# Patient Record
Sex: FEMALE | Race: OTHER | HISPANIC OR LATINO | Employment: FULL TIME | URBAN - METROPOLITAN AREA
[De-identification: names, ages, dates, MRNs, and addresses within clinical notes are randomized per-mention and may not be internally consistent; named-entity substitution may affect disease eponyms.]

---

## 2017-01-10 ENCOUNTER — OFFICE VISIT (OUTPATIENT)
Dept: MEDICAL GROUP | Facility: MEDICAL CENTER | Age: 22
End: 2017-01-10
Payer: COMMERCIAL

## 2017-01-10 VITALS
HEART RATE: 78 BPM | BODY MASS INDEX: 32.25 KG/M2 | TEMPERATURE: 97.8 F | SYSTOLIC BLOOD PRESSURE: 110 MMHG | DIASTOLIC BLOOD PRESSURE: 64 MMHG | RESPIRATION RATE: 16 BRPM | HEIGHT: 59 IN | OXYGEN SATURATION: 98 % | WEIGHT: 160 LBS

## 2017-01-10 DIAGNOSIS — M54.50 CHRONIC MIDLINE LOW BACK PAIN WITHOUT SCIATICA: ICD-10-CM

## 2017-01-10 DIAGNOSIS — F34.1 DYSTHYMIA: ICD-10-CM

## 2017-01-10 DIAGNOSIS — G89.29 CHRONIC MIDLINE LOW BACK PAIN WITHOUT SCIATICA: ICD-10-CM

## 2017-01-10 PROCEDURE — 99214 OFFICE O/P EST MOD 30 MIN: CPT | Performed by: INTERNAL MEDICINE

## 2017-01-10 RX ORDER — LIDOCAINE 50 MG/G
1 PATCH TOPICAL EVERY 24 HOURS
Qty: 30 PATCH | Refills: 1 | Status: SHIPPED | OUTPATIENT
Start: 2017-01-10 | End: 2017-03-14 | Stop reason: SDUPTHER

## 2017-01-10 ASSESSMENT — PATIENT HEALTH QUESTIONNAIRE - PHQ9: CLINICAL INTERPRETATION OF PHQ2 SCORE: 0

## 2017-01-10 NOTE — MR AVS SNAPSHOT
"        Isa Lamar   1/10/2017 2:40 PM   Office Visit   MRN: [id number]    Department:  [address] [place] [address]   Dept Phone:  [phone]    Description:  Female : 1995   Provider:  Dave Sanchez M.D.           Reason for Visit     Follow-Up meds      Allergies as of 1/10/2017     Allergen Noted Reactions    Nkda [No Known Drug Allergy] 2009         You were diagnosed with     Chronic midline low back pain without sciatica   [[id number]]         Vital Signs     Blood Pressure Pulse Temperature Respirations Height Weight    110/64 mmHg 78 36.6 °C (97.8 °F) 16 1.499 m (4' 11\") 72.576 kg (160 lb)    Body Mass Index Oxygen Saturation Smoking Status             32.30 kg/m2 98% Former Smoker         Basic Information     Date Of Birth Sex Race Ethnicity Preferred Language    1995 Female  or   Origin (Citizen of [place],Georgian,Georgian,Rudy, etc) English      Your appointments     Elan 10, 2017  2:40 PM   Established Patient with Dave Sanchez M.D.   [facility] [address]    [address] [facility] [address]  [facility] [address]   [phone]           You will be receiving a confirmation call a few days before your appointment from our automated call confirmation system.              Problem List              ICD-10-CM Priority Class Noted - Resolved    Non morbid obesity due to excess calories E66.09   2016 - Present    Dysthymia F34.1   2016 - Present      Health Maintenance        Date Due Completion Dates    PAP SMEAR 2019, 2015    IMM DTaP/Tdap/Td Vaccine (8 - Td) 2023, 2006, 3/10/1998, 1995, 5/10/1994, 3/9/1994, 1994            Current Immunizations     Dtap Vaccine 3/10/1998, 1995, 5/10/1994, 3/9/1994, 1994    HIB Vaccine (ACTHIB/HIBERIX) 1995, 5/10/1994, 3/9/1994, 1994    HPV Quadrivalent Vaccine (GARDASIL) 3/24/2011, 3/24/2011, 2010, 2009    Hepatitis A Vaccine, " Ped/Adol 1/5/2006, 6/15/2005    Hepatitis B Vaccine Non-Recombivax (Ped/Adol) 5/10/1994, 3/9/1994, 1/13/1994    Influenza LAIV (Nasal) 11/19/2010, 10/30/2009    Influenza Vaccine Quad Inj (Pf) 10/16/2015    Influenza Vaccine Quad Inj (Preserved) 10/17/2016  3:05 PM    MMR Vaccine 3/10/1998, 2/1/1995    Meningococcal Conjugate Vaccine MCV4 (Menveo) 5/21/2009    OPV - Historical Data 3/10/1998, 5/10/1994, 3/9/1994, 1/13/1994    Tdap Vaccine 9/26/2013 11:20 PM, 1/5/2006    Tuberculin Skin Test 12/24/2014 12:15 PM, 12/17/2014    Varicella Vaccine Live 8/1/2006, 3/10/1998      Below and/or attached are the medications your provider expects you to take. Review all of your home medications and newly ordered medications with your provider and/or pharmacist. Follow medication instructions as directed by your provider and/or pharmacist. Please keep your medication list with you and share with your provider. Update the information when medications are discontinued, doses are changed, or new medications (including over-the-counter products) are added; and carry medication information at all times in the event of emergency situations     Allergies:  NKDA - (reactions not documented)               Medications  Valid as of: January 10, 2017 -  2:06 PM    Generic Name Brand Name Tablet Size Instructions for use    Citalopram Hydrobromide (Tab) CELEXA 20 MG Take 1 Tab by mouth every day.        Lidocaine (Patch) LIDODERM 5 % Apply 1 Patch to skin as directed every 24 hours.        .                 Medicines prescribed today were sent to:     [facility]/PHARMACY #[id number] - [place], [place] - [address]    [address] [address]    Phone: [phone] Fax: [phone]    Open 24 Hours?: No      Medication refill instructions:       If your prescription bottle indicates you have medication refills left, it is not necessary to call your provider’s office. Please contact your pharmacy and they will refill your medication.    If your  prescription bottle indicates you do not have any refills left, you may request refills at any time through one of the following ways: The online [employer] system (except Urgent Care), by calling your provider’s office, or by asking your pharmacy to contact your provider’s office with a refill request. Medication refills are processed only during regular business hours and may not be available until the next business day. Your provider may request additional information or to have a follow-up visit with you prior to refilling your medication.   *Please Note: Medication refills are assigned a new Rx number when refilled electronically. Your pharmacy may indicate that no refills were authorized even though a new prescription for the same medication is available at the pharmacy. Please request the medicine by name with the pharmacy before contacting your provider for a refill.        Other Notes About Your Plan     GIRL = name will be surprise           [facility] Access Code: Activation code not generated  Current [employer] Status: Active

## 2017-01-10 NOTE — PROGRESS NOTES
"CC: Follow-up back pain and mood.    HPI:   Isa presents today with the following.    1. Chronic midline low back pain without sciatica  Presents reporting persistent intermittent back pain that started 10 in intensity without radiation or change in bowel or bladder. We discussed physical therapy in the past but she is not wanting to do so. She would like to try some Lidoderm patches.    2. Dysthymia  She was started on Celexa and report her mood is 20% better than it was last time we sat down. She denies any ill side effects and no suicidal ideation.      Patient Active Problem List    Diagnosis Date Noted   • Non morbid obesity due to excess calories 11/08/2016   • Dysthymia 11/08/2016       Current Outpatient Prescriptions   Medication Sig Dispense Refill   • lidocaine (LIDODERM) 5 % Patch Apply 1 Patch to skin as directed every 24 hours. 30 Patch 1   • citalopram (CELEXA) 20 MG Tab Take 1 Tab by mouth every day. 30 Tab 6     No current facility-administered medications for this visit.         Allergies as of 01/10/2017 - Luis as Reviewed 01/10/2017   Allergen Reaction Noted   • Nkda [no known drug allergy]  07/23/2009        ROS: As per HPI.    /64 mmHg  Pulse 78  Temp(Src) 36.6 °C (97.8 °F)  Resp 16  Ht 1.499 m (4' 11\")  Wt 72.576 kg (160 lb)  BMI 32.30 kg/m2  SpO2 98%    Physical Exam:  Gen:         Alert and oriented, No apparent distress.  Neck:        No Lymphadenopathy or Bruits.  Lungs:     Clear to auscultation bilaterally  CV:          Regular rate and rhythm. No murmurs, rubs or gallops.               Ext:          No clubbing, cyanosis, edema.      Assessment and Plan.   21 y.o. female with the following issues.    1. Chronic midline low back pain without sciatica  Again discussed physical therapy which she currently declines. Have written for Lidoderm patches.  - lidocaine (LIDODERM) 5 % Patch; Apply 1 Patch to skin as directed every 24 hours.  Dispense: 30 Patch; Refill: 1    2. " Dysthymia  Discussion about increasing dose to medication or therapy but she would like to see if her mood continues to improve. Have encouraged her to find some time for self she currently does not engage in many enjoyable activities. We'll see back in 6 months.

## 2017-03-10 ENCOUNTER — TELEPHONE (OUTPATIENT)
Dept: MEDICAL GROUP | Facility: MEDICAL CENTER | Age: 22
End: 2017-03-10

## 2017-03-10 NOTE — TELEPHONE ENCOUNTER
1. Caller Name: Isa Lamar                                           Call Back Number: [phone] (home)         Patient approves a detailed voicemail message: N\A    2. SPECIFIC Action To Be Taken: Referral for Pulmonology  Patient stated that she has an on going cough with no chills or fevers, gets short of breath with wheezing and wanted to know if you would please place a referral to a pulmonologist, through the [facility]mon[facility] group? Thank you Please advise    3. Diagnosis/Clinical Reason for Request: Short of Breath     4. Specialty & Provider Name/Lab/Imaging Location: N/A    5. Is appointment scheduled for requested order/referral: NO    Unable to place referral

## 2017-03-10 NOTE — TELEPHONE ENCOUNTER
Would suggest we have a visit first to talk about if we havent done so already.  Also if need be we can order breathing tests first to hep decide what is going on.

## 2017-03-10 NOTE — TELEPHONE ENCOUNTER
Patient has been advised about the message below, patient will call back to schedule an appointment to see Dr. Sanchez.

## 2017-03-21 ENCOUNTER — OFFICE VISIT (OUTPATIENT)
Dept: MEDICAL GROUP | Facility: MEDICAL CENTER | Age: 22
End: 2017-03-21
Payer: COMMERCIAL

## 2017-03-21 VITALS
SYSTOLIC BLOOD PRESSURE: 108 MMHG | DIASTOLIC BLOOD PRESSURE: 64 MMHG | TEMPERATURE: 97.5 F | RESPIRATION RATE: 16 BRPM | WEIGHT: 154 LBS | HEART RATE: 86 BPM | BODY MASS INDEX: 31.04 KG/M2 | HEIGHT: 59 IN | OXYGEN SATURATION: 93 %

## 2017-03-21 DIAGNOSIS — R06.09 DOE (DYSPNEA ON EXERTION): ICD-10-CM

## 2017-03-21 DIAGNOSIS — R05.9 COUGH: ICD-10-CM

## 2017-03-21 DIAGNOSIS — K21.00 GASTROESOPHAGEAL REFLUX DISEASE WITH ESOPHAGITIS: ICD-10-CM

## 2017-03-21 PROCEDURE — 99214 OFFICE O/P EST MOD 30 MIN: CPT | Performed by: INTERNAL MEDICINE

## 2017-03-21 RX ORDER — ALBUTEROL SULFATE 90 UG/1
2 AEROSOL, METERED RESPIRATORY (INHALATION) EVERY 6 HOURS PRN
Qty: 8.5 G | Refills: 11 | Status: SHIPPED | OUTPATIENT
Start: 2017-03-21 | End: 2017-08-01 | Stop reason: SDUPTHER

## 2017-03-21 RX ORDER — CITALOPRAM 20 MG/1
40 TABLET ORAL DAILY
Qty: 60 TAB | Refills: 6 | Status: SHIPPED | OUTPATIENT
Start: 2017-03-21 | End: 2018-01-23

## 2017-03-21 RX ORDER — OMEPRAZOLE 40 MG/1
40 CAPSULE, DELAYED RELEASE ORAL DAILY
Qty: 30 CAP | Refills: 6 | Status: SHIPPED | OUTPATIENT
Start: 2017-03-21 | End: 2020-08-10 | Stop reason: SDUPTHER

## 2017-03-21 NOTE — MR AVS SNAPSHOT
"        Isa Lamar   3/21/2017 3:20 PM   Office Visit   MRN: [id number]    Department:  [address] [place] [address]   Dept Phone:  [phone]    Description:  Female : 1995   Provider:  Dave Sanchez M.D.           Reason for Visit     Referral Needed Pulom      Allergies as of 3/21/2017     Allergen Noted Reactions    Nkda [No Known Drug Allergy] 2009         Vital Signs     Blood Pressure Pulse Temperature Respirations Height Weight    108/64 mmHg 86 36.4 °C (97.5 °F) 16 1.499 m (4' 11\") 69.854 kg (154 lb)    Body Mass Index Oxygen Saturation Smoking Status             31.09 kg/m2 93% Former Smoker         Basic Information     Date Of Birth Sex Race Ethnicity Preferred Language    1995 Female  or   Origin (Barbadian,Singaporean,Mauritian,Rudy, etc) English      Your appointments     2017  2:45 PM   Annual Exam with Marjorie Palm M.D.   [facility]    [address]  [facility] [address]   [phone]              Problem List              ICD-10-CM Priority Class Noted - Resolved    Non morbid obesity due to excess calories E66.09   2016 - Present    Dysthymia F34.1   2016 - Present      Health Maintenance        Date Due Completion Dates    PAP SMEAR 2019, 2015    IMM DTaP/Tdap/Td Vaccine (8 - Td) 2023, 2006, 3/10/1998, 1995, 5/10/1994, 3/9/1994, 1994            Current Immunizations     Dtap Vaccine 3/10/1998, 1995, 5/10/1994, 3/9/1994, 1994    HIB Vaccine (ACTHIB/HIBERIX) 1995, 5/10/1994, 3/9/1994, 1994    HPV Quadrivalent Vaccine (GARDASIL) 3/24/2011, 3/24/2011, 2010, 2009    Hepatitis A Vaccine, Ped/Adol 2006, 6/15/2005    Hepatitis B Vaccine Non-Recombivax (Ped/Adol) 5/10/1994, 3/9/1994, 1994    Influenza LAIV (Nasal) 2010, 10/30/2009    Influenza Vaccine Quad Inj (Pf) 10/16/2015   " Influenza Vaccine Quad Inj (Preserved) 10/17/2016  3:05 PM    MMR Vaccine 3/10/1998, 2/1/1995    Meningococcal Conjugate Vaccine MCV4 (Menveo) 5/21/2009    OPV - Historical Data 3/10/1998, 5/10/1994, 3/9/1994, 1/13/1994    Tdap Vaccine 9/26/2013 11:20 PM, 1/5/2006    Tuberculin Skin Test 12/24/2014 12:15 PM, 12/17/2014    Varicella Vaccine Live 8/1/2006, 3/10/1998      Below and/or attached are the medications your provider expects you to take. Review all of your home medications and newly ordered medications with your provider and/or pharmacist. Follow medication instructions as directed by your provider and/or pharmacist. Please keep your medication list with you and share with your provider. Update the information when medications are discontinued, doses are changed, or new medications (including over-the-counter products) are added; and carry medication information at all times in the event of emergency situations     Allergies:  NKDA - (reactions not documented)               Medications  Valid as of: March 21, 2017 -  3:52 PM    Generic Name Brand Name Tablet Size Instructions for use    Citalopram Hydrobromide (Tab) CELEXA 40 MG Take 1 Tab by mouth every day.        Lidocaine (Patch) LIDODERM 5 % Apply 1 Patch to skin as directed every 24 hours.        .                 Medicines prescribed today were sent to:     [facility]/PHARMACY #[id number] [facility] [place] - [address]    [address] [address]    Phone: [phone] Fax: [phone]    Open 24 Hours?: No      Medication refill instructions:       If your prescription bottle indicates you have medication refills left, it is not necessary to call your provider’s office. Please contact your pharmacy and they will refill your medication.    If your prescription bottle indicates you do not have any refills left, you may request refills at any time through one of the following ways: The online [employer] system (except Urgent Care), by calling your provider’s  office, or by asking your pharmacy to contact your provider’s office with a refill request. Medication refills are processed only during regular business hours and may not be available until the next business day. Your provider may request additional information or to have a follow-up visit with you prior to refilling your medication.   *Please Note: Medication refills are assigned a new Rx number when refilled electronically. Your pharmacy may indicate that no refills were authorized even though a new prescription for the same medication is available at the pharmacy. Please request the medicine by name with the pharmacy before contacting your provider for a refill.        Other Notes About Your Plan     GIRL = name will be surprise           MyChart Access Code: Activation code not generated  Current [employer] Status: Active

## 2017-03-21 NOTE — PROGRESS NOTES
"CC: Follow-up multiple issues    HPI:   Isa presents today with the following.    1. OTT (dyspnea on exertion)  Main complaint is wheezing with activity. She's never been diagnosed with asthma or use an inhaler. She has become more active in the recent past and again her weight is going down. She denies any chest pain or edema. She does have some mild allergy type symptoms.    2. Gastroesophageal reflux disease with esophagitis  She does suffer from reflux persistently taking Tums when necessary. Several times per week. No difficulty swallowing and no blood or black stools.    3. Cough  She is complaining of a persistent cough outside of exercise. She was sick several weeks ago but that has resolved but the cough persists.      Patient Active Problem List    Diagnosis Date Noted   • Gastroesophageal reflux disease with esophagitis 03/21/2017   • Non morbid obesity due to excess calories 11/08/2016   • Dysthymia 11/08/2016       Current Outpatient Prescriptions   Medication Sig Dispense Refill   • omeprazole (PRILOSEC) 40 MG delayed-release capsule Take 1 Cap by mouth every day. 30 Cap 6   • albuterol 108 (90 BASE) MCG/ACT Aero Soln inhalation aerosol Inhale 2 Puffs by mouth every 6 hours as needed for Shortness of Breath. 8.5 g 11   • citalopram (CELEXA) 20 MG Tab Take 2 Tabs by mouth every day. 60 Tab 6   • lidocaine (LIDODERM) 5 % Patch Apply 1 Patch to skin as directed every 24 hours. 30 Patch 1     No current facility-administered medications for this visit.         Allergies as of 03/21/2017 - Luis as Reviewed 03/21/2017   Allergen Reaction Noted   • Nkda [no known drug allergy]  07/23/2009        ROS: As per HPI.    /64 mmHg  Pulse 86  Temp(Src) 36.4 °C (97.5 °F)  Resp 16  Ht 1.499 m (4' 11\")  Wt 69.854 kg (154 lb)  BMI 31.09 kg/m2  SpO2 93%    Physical Exam:  Gen:         Alert and oriented, No apparent distress.  Neck:        No Lymphadenopathy or Bruits.  Lungs:     Clear to auscultation " bilaterally  CV:          Regular rate and rhythm. No murmurs, rubs or gallops.               Ext:          No clubbing, cyanosis, edema.      Assessment and Plan.   21 y.o. female with the following issues.    1. OTT (dyspnea on exertion)  Have written for pulmonary function tests given an albuterol inhaler prior to activity.  - PULMONARY FUNCTION TESTS Test requested: Complete Pulmonary Function Test  - albuterol 108 (90 BASE) MCG/ACT Aero Soln inhalation aerosol; Inhale 2 Puffs by mouth every 6 hours as needed for Shortness of Breath.  Dispense: 8.5 g; Refill: 11    2. Gastroesophageal reflux disease with esophagitis  Placing on omeprazole cautioned about side effects.  - omeprazole (PRILOSEC) 40 MG delayed-release capsule; Take 1 Cap by mouth every day.  Dispense: 30 Cap; Refill: 6    3. Cough  Possible contributing factors of heartburn, allergies, potentially asthma. She is gonna take an antihistamine as well as the above treatments and will follow-up with results.

## 2017-04-03 ENCOUNTER — TELEPHONE (OUTPATIENT)
Dept: PULMONOLOGY | Facility: HOSPICE | Age: 22
End: 2017-04-03

## 2017-04-03 DIAGNOSIS — R06.00 DYSPNEA, UNSPECIFIED TYPE: ICD-10-CM

## 2017-04-04 ENCOUNTER — NON-PROVIDER VISIT (OUTPATIENT)
Dept: PULMONOLOGY | Facility: HOSPICE | Age: 22
End: 2017-04-04
Attending: INTERNAL MEDICINE
Payer: COMMERCIAL

## 2017-04-04 VITALS — BODY MASS INDEX: 30.24 KG/M2 | WEIGHT: 150 LBS | HEIGHT: 59 IN

## 2017-04-04 DIAGNOSIS — R06.00 DYSPNEA, UNSPECIFIED TYPE: ICD-10-CM

## 2017-04-04 PROCEDURE — 94060 EVALUATION OF WHEEZING: CPT | Performed by: INTERNAL MEDICINE

## 2017-04-04 PROCEDURE — 94729 DIFFUSING CAPACITY: CPT | Performed by: INTERNAL MEDICINE

## 2017-04-04 PROCEDURE — 94726 PLETHYSMOGRAPHY LUNG VOLUMES: CPT | Performed by: INTERNAL MEDICINE

## 2017-04-04 ASSESSMENT — PULMONARY FUNCTION TESTS
FEV1_PERCENT_CHANGE: 0
FEV1/FVC_PERCENT_CHANGE: 0
FVC: 3.67
FEV1/FVC_PERCENT_PREDICTED: 87
FEV1/FVC: 81
FEV1: 2.98
FEV1/FVC: 86.38
FEV1_PERCENT_PREDICTED: 105
FVC_PREDICTED: 3.26
FVC_PERCENT_PREDICTED: 112
FVC: 3.67
FEV1: 3.17
FEV1_PERCENT_CHANGE: 6
FVC_PERCENT_PREDICTED: 112
FEV1_PERCENT_PREDICTED: 111
FEV1_PREDICTED: 2.84
FEV1/FVC_PERCENT_PREDICTED: 94
FEV1/FVC_PERCENT_PREDICTED: 99

## 2017-04-04 NOTE — MR AVS SNAPSHOT
"        Isa AbebeRamila   2017 2:00 PM   Non-Provider Visit   MRN: [id number]    Department:  [facility]   Dept Phone:  [phone]    Description:  Female : 1995   Provider:  Arthur Patel M.D.           Reason for Visit     Shortness of Breath           Allergies as of 2017     Allergen Noted Reactions    Nkda [No Known Drug Allergy] 2009         You were diagnosed with     Dyspnea, unspecified type   [[id number]]         Vital Signs     Height Weight Body Mass Index Smoking Status          1.499 m (4' 11\") 68.04 kg (150 lb) 30.28 kg/m2 Former Smoker        Basic Information     Date Of Birth Sex Race Ethnicity Preferred Language    1995 Female  or   Origin (Amharic,Libyan,Moroccan,Rudy, etc) English      Your appointments     2017  2:45 PM   Annual Exam with Marjorie Palm M.D.   [facility]    [address]  [facility] [id number] [phone]              Problem List              ICD-10-CM Priority Class Noted - Resolved    Non morbid obesity due to excess calories E66.09   2016 - Present    Dysthymia F34.1   2016 - Present    Gastroesophageal reflux disease with esophagitis K21.0   3/21/2017 - Present      Health Maintenance        Date Due Completion Dates    PAP SMEAR 2019, 2015    IMM DTaP/Tdap/Td Vaccine (8 - Td) 2023, 2006, 3/10/1998, 1995, 5/10/1994, 3/9/1994, 1994            Current Immunizations     Dtap Vaccine 3/10/1998, 1995, 5/10/1994, 3/9/1994, 1994    HIB Vaccine (ACTHIB/HIBERIX) 1995, 5/10/1994, 3/9/1994, 1994    HPV Quadrivalent Vaccine (GARDASIL) 3/24/2011, 3/24/2011, 2010, 2009    Hepatitis A Vaccine, Ped/Adol 2006, 6/15/2005    Hepatitis B Vaccine Non-Recombivax (Ped/Adol) 5/10/1994, 3/9/1994, 1994    Influenza LAIV (Nasal) 2010, 10/30/2009   " Influenza Vaccine Quad Inj (Pf) 10/16/2015    Influenza Vaccine Quad Inj (Preserved) 10/17/2016  3:05 PM    MMR Vaccine 3/10/1998, 2/1/1995    Meningococcal Conjugate Vaccine MCV4 (Menveo) 5/21/2009    OPV - Historical Data 3/10/1998, 5/10/1994, 3/9/1994, 1/13/1994    Tdap Vaccine 9/26/2013 11:20 PM, 1/5/2006    Tuberculin Skin Test 12/24/2014 12:15 PM, 12/17/2014    Varicella Vaccine Live 8/1/2006, 3/10/1998      Below and/or attached are the medications your provider expects you to take. Review all of your home medications and newly ordered medications with your provider and/or pharmacist. Follow medication instructions as directed by your provider and/or pharmacist. Please keep your medication list with you and share with your provider. Update the information when medications are discontinued, doses are changed, or new medications (including over-the-counter products) are added; and carry medication information at all times in the event of emergency situations     Allergies:  NKDA - (reactions not documented)               Medications  Valid as of: April 04, 2017 -  2:38 PM    Generic Name Brand Name Tablet Size Instructions for use    Albuterol Sulfate (Aero Soln) albuterol 108 (90 BASE) MCG/ACT Inhale 2 Puffs by mouth every 6 hours as needed for Shortness of Breath.        Citalopram Hydrobromide (Tab) CELEXA 20 MG Take 2 Tabs by mouth every day.        Lidocaine (Patch) LIDODERM 5 % Apply 1 Patch to skin as directed every 24 hours.        Omeprazole (CAPSULE DELAYED RELEASE) PRILOSEC 40 MG Take 1 Cap by mouth every day.        .                 Medicines prescribed today were sent to:     [facility]/PHARMACY #[id number] - [place], [place] - [address]    [address] [address]    Phone: [phone] Fax: [phone]    Open 24 Hours?: No      Medication refill instructions:       If your prescription bottle indicates you have medication refills left, it is not necessary to call your provider’s office. Please  contact your pharmacy and they will refill your medication.    If your prescription bottle indicates you do not have any refills left, you may request refills at any time through one of the following ways: The online [employer] system (except Urgent Care), by calling your provider’s office, or by asking your pharmacy to contact your provider’s office with a refill request. Medication refills are processed only during regular business hours and may not be available until the next business day. Your provider may request additional information or to have a follow-up visit with you prior to refilling your medication.   *Please Note: Medication refills are assigned a new Rx number when refilled electronically. Your pharmacy may indicate that no refills were authorized even though a new prescription for the same medication is available at the pharmacy. Please request the medicine by name with the pharmacy before contacting your provider for a refill.        Other Notes About Your Plan     GIRL = name will be surprise           [employer] Access Code: Activation code not generated  Current [employer] Status: Active

## 2017-04-04 NOTE — PROCEDURES
Technician: RITO Mckeon    Technician Comment:  Good patient effort & cooperation.  The results of this test meet the ATS/ERS standards for acceptability & reproducibility.  Test was performed on the [employer] Body Plethysmograph-Elite DX system.  Predicted values were [facility]-3 for spirometry, [facility] for DLCO, ITS for Lung Volumes.  The DLCO was uncorrected for Hgb.  A bronchodilator of Ventolin HFA -2puffs via spacer administered.    Interpretation:    Baseline FEV1 is 105% of predicted at 2.98 L. FEV1 FVC ratio is normal.  After administration of an inhaled bronchodilator there is a 6% improvement in FEV1.  Total lung capacity is 116% of predicted.  Gas exchange is estimated by DLCO is 124% of predicted  Airways resistance is normal.    No definite evidence of significant obstructive or restrictive pulmonary disease is noted on this study. The study may be compatible with a clinical diagnosis of asthma.

## 2017-04-07 ENCOUNTER — PATIENT MESSAGE (OUTPATIENT)
Dept: OBGYN | Facility: MEDICAL CENTER | Age: 22
End: 2017-04-07

## 2017-04-07 NOTE — TELEPHONE ENCOUNTER
----- Message from Your Healthcare Team sent at 4/7/2017  2:05 AM PDT -----  Regarding: Non-Urgent Medical Question  Contact: [phone]  I've been waking up with the urgency to go to the bathroom every time I go not much comes out and it' burns. I feel very uncomfortable, I may have a UTI can you please send labs. Thank you

## 2017-04-11 DIAGNOSIS — N30.00 ACUTE CYSTITIS WITHOUT HEMATURIA: ICD-10-CM

## 2017-05-09 ENCOUNTER — GYNECOLOGY VISIT (OUTPATIENT)
Dept: OBGYN | Facility: MEDICAL CENTER | Age: 22
End: 2017-05-09
Payer: COMMERCIAL

## 2017-05-09 ENCOUNTER — HOSPITAL ENCOUNTER (OUTPATIENT)
Facility: MEDICAL CENTER | Age: 22
End: 2017-05-09
Attending: OBSTETRICS & GYNECOLOGY
Payer: COMMERCIAL

## 2017-05-09 VITALS
BODY MASS INDEX: 31.04 KG/M2 | HEIGHT: 59 IN | SYSTOLIC BLOOD PRESSURE: 110 MMHG | DIASTOLIC BLOOD PRESSURE: 80 MMHG | WEIGHT: 154 LBS

## 2017-05-09 DIAGNOSIS — Z01.419 WELL WOMAN EXAM: ICD-10-CM

## 2017-05-09 DIAGNOSIS — Z11.3 SCREENING EXAMINATION FOR VENEREAL DISEASE: ICD-10-CM

## 2017-05-09 DIAGNOSIS — Z12.4 ROUTINE CERVICAL SMEAR: ICD-10-CM

## 2017-05-09 LAB
APPEARANCE UR: CLEAR
BILIRUB UR STRIP-MCNC: NORMAL MG/DL
COLOR UR AUTO: YELLOW
GLUCOSE UR STRIP.AUTO-MCNC: NORMAL MG/DL
KETONES UR STRIP.AUTO-MCNC: NORMAL MG/DL
LEUKOCYTE ESTERASE UR QL STRIP.AUTO: NORMAL
NITRITE UR QL STRIP.AUTO: NORMAL
PH UR STRIP.AUTO: 7.5 [PH] (ref 5–8)
PROT UR QL STRIP: NORMAL MG/DL
RBC UR QL AUTO: NORMAL
SP GR UR STRIP.AUTO: 1.01
UROBILINOGEN UR STRIP-MCNC: NORMAL MG/DL

## 2017-05-09 PROCEDURE — 87186 SC STD MICRODIL/AGAR DIL: CPT

## 2017-05-09 PROCEDURE — 87491 CHLMYD TRACH DNA AMP PROBE: CPT

## 2017-05-09 PROCEDURE — 87086 URINE CULTURE/COLONY COUNT: CPT

## 2017-05-09 PROCEDURE — 87077 CULTURE AEROBIC IDENTIFY: CPT

## 2017-05-09 PROCEDURE — 88175 CYTOPATH C/V AUTO FLUID REDO: CPT

## 2017-05-09 PROCEDURE — 87591 N.GONORRHOEAE DNA AMP PROB: CPT

## 2017-05-09 PROCEDURE — 81002 URINALYSIS NONAUTO W/O SCOPE: CPT | Performed by: OBSTETRICS & GYNECOLOGY

## 2017-05-09 PROCEDURE — 99395 PREV VISIT EST AGE 18-39: CPT | Mod: 25 | Performed by: OBSTETRICS & GYNECOLOGY

## 2017-05-09 ASSESSMENT — ENCOUNTER SYMPTOMS
PALPITATIONS: 0
ABDOMINAL PAIN: 0
MYALGIAS: 0
PHOTOPHOBIA: 0
CONSTITUTIONAL NEGATIVE: 1
DOUBLE VISION: 0
DIARRHEA: 0
SHORTNESS OF BREATH: 0
NERVOUS/ANXIOUS: 0
HALLUCINATIONS: 0
CONSTIPATION: 0
BLURRED VISION: 0
DEPRESSION: 0
SPUTUM PRODUCTION: 0

## 2017-05-09 ASSESSMENT — LIFESTYLE VARIABLES: SUBSTANCE_ABUSE: 0

## 2017-05-09 NOTE — MR AVS SNAPSHOT
"        Isa AbebeKoreyPadgett   2017 2:45 PM   Gynecology Visit   MRN: [id number]    Department:  [facility]   Dept Phone:  [phone]    Description:  Female : 1995   Provider:  Marjorie Palm M.D.           Reason for Visit     Gynecologic Exam Annual Exam       Allergies as of 2017     Allergen Noted Reactions    Nkda [No Known Drug Allergy] 2009         You were diagnosed with     Well woman exam   [588998]       Routine cervical smear   [990282]       Screening examination for venereal disease   [V74.5.ICD-9-CM]         Vital Signs     Blood Pressure Height Weight Body Mass Index Last Menstrual Period Smoking Status    110/80 mmHg 1.499 m (4' 11.02\") 69.854 kg (154 lb) 31.09 kg/m2 2017 Former Smoker      Basic Information     Date Of Birth Sex Race Ethnicity Preferred Language    1995 Female  or   Origin (Cymraes,Omani,Finnish,Rudy, etc) English      Problem List              ICD-10-CM Priority Class Noted - Resolved    Non morbid obesity due to excess calories E66.09   2016 - Present    Dysthymia F34.1   2016 - Present    Gastroesophageal reflux disease with esophagitis K21.0   3/21/2017 - Present      Health Maintenance        Date Due Completion Dates    PAP SMEAR 2019, 2015    IMM DTaP/Tdap/Td Vaccine (8 - Td) 2023, 2006, 3/10/1998, 1995, 5/10/1994, 3/9/1994, 1994            Current Immunizations     Dtap Vaccine 3/10/1998, 1995, 5/10/1994, 3/9/1994, 1994    HIB Vaccine (ACTHIB/HIBERIX) 1995, 5/10/1994, 3/9/1994, 1994    HPV Quadrivalent Vaccine (GARDASIL) 3/24/2011, 3/24/2011, 2010, 2009    Hepatitis A Vaccine, Ped/Adol 2006, 6/15/2005    Hepatitis B Vaccine Non-Recombivax (Ped/Adol) 5/10/1994, 3/9/1994, 1994    Influenza LAIV (Nasal) 2010, 10/30/2009    Influenza Vaccine Quad Inj (Pf) 10/16/2015    Influenza Vaccine Quad Inj " (Preserved) 10/17/2016  3:05 PM    MMR Vaccine 3/10/1998, 2/1/1995    Meningococcal Conjugate Vaccine MCV4 (Menveo) 5/21/2009    OPV - Historical Data 3/10/1998, 5/10/1994, 3/9/1994, 1/13/1994    Tdap Vaccine 9/26/2013 11:20 PM, 1/5/2006    Tuberculin Skin Test 12/24/2014 12:15 PM, 12/17/2014    Varicella Vaccine Live 8/1/2006, 3/10/1998      Below and/or attached are the medications your provider expects you to take. Review all of your home medications and newly ordered medications with your provider and/or pharmacist. Follow medication instructions as directed by your provider and/or pharmacist. Please keep your medication list with you and share with your provider. Update the information when medications are discontinued, doses are changed, or new medications (including over-the-counter products) are added; and carry medication information at all times in the event of emergency situations     Allergies:  NKDA - (reactions not documented)               Medications  Valid as of: May 09, 2017 -  3:19 PM    Generic Name Brand Name Tablet Size Instructions for use    Albuterol Sulfate (Aero Soln) albuterol 108 (90 BASE) MCG/ACT Inhale 2 Puffs by mouth every 6 hours as needed for Shortness of Breath.        Citalopram Hydrobromide (Tab) CELEXA 20 MG Take 2 Tabs by mouth every day.        Lidocaine (Patch) LIDODERM 5 % Apply 1 Patch to skin as directed every 24 hours.        Omeprazole (CAPSULE DELAYED RELEASE) PRILOSEC 40 MG Take 1 Cap by mouth every day.        .                 Medicines prescribed today were sent to:     [facility]/PHARMACY #[id number] - [place], [place] - [address] [facility]    [address] [place] [address]    Phone: [phone] Fax: [phone]    Open 24 Hours?: No      Medication refill instructions:       If your prescription bottle indicates you have medication refills left, it is not necessary to call your provider’s office. Please contact your pharmacy and they will refill your medication.    If your  prescription bottle indicates you do not have any refills left, you may request refills at any time through one of the following ways: The online [employer] system (except Urgent Care), by calling your provider’s office, or by asking your pharmacy to contact your provider’s office with a refill request. Medication refills are processed only during regular business hours and may not be available until the next business day. Your provider may request additional information or to have a follow-up visit with you prior to refilling your medication.   *Please Note: Medication refills are assigned a new Rx number when refilled electronically. Your pharmacy may indicate that no refills were authorized even though a new prescription for the same medication is available at the pharmacy. Please request the medicine by name with the pharmacy before contacting your provider for a refill.        Your To Do List     Future Labs/Procedures Complete By Expires    HEP B SURFACE ANTIGEN  As directed 5/9/2018    HIV ANTIBODIES  As directed 5/9/2018    THINPREP RFLX HPV ASCUS W/CTNG  As directed 5/9/2018      Other Notes About Your Plan     GIRL = name will be surprise           Flowonixt Access Code: Activation code not generated  Current [employer] Status: Active

## 2017-05-09 NOTE — PROGRESS NOTES
Subjective:      Isa Lamar is a 21 y.o. female who presents with Gynecologic Exam    For annual exam wants STD screening.  On nexplanon   has periods on and off.    Active Ambulatory Problems     Diagnosis Date Noted   • Non morbid obesity due to excess calories 11/08/2016   • Dysthymia 11/08/2016   • Gastroesophageal reflux disease with esophagitis 03/21/2017     Resolved Ambulatory Problems     Diagnosis Date Noted   • EBV infection 11/22/2010   • Supervision of normal first pregnancy 02/20/2013   • Nausea and vomiting in pregnancy 02/20/2013   • Chlamydia infection - ROBBIE done 3/21 03/21/2013   • UTI (urinary tract infection) during pregnancy 03/21/2013   • EBV infection - Chronic 05/14/2013   • Normal labor and delivery 09/26/2013   • Postpartum care and examination of lactating mother 11/06/2013   • Closed fracture of metacarpal bone 06/24/2014   • Flank pain 03/26/2015     Past Medical History   Diagnosis Date   • Anemia 2013   • Infectious mononucleosis      Social History     Social History   • Marital Status: Single     Spouse Name: N/A   • Number of Children: N/A   • Years of Education: N/A     Occupational History   • Not on file.     Social History Main Topics   • Smoking status: Former Smoker -- 0.05 packs/day for 2 years     Types: Cigarettes     Quit date: 02/20/2011   • Smokeless tobacco: Never Used   • Alcohol Use: No   • Drug Use: No   • Sexual Activity:     Partners: Male     Birth Control/ Protection: Condom      Comment: condom     Other Topics Concern   • Not on file     Social History Narrative     Family History   Problem Relation Age of Onset   • Cancer Maternal Aunt      breast cancer    • Diabetes Maternal Uncle    • Lung Disease Paternal Aunt      asthma   • Hyperlipidemia Maternal Grandfather    • Diabetes Maternal Grandfather      diet control   • Hypertension Maternal Grandfather    • Heart Disease Maternal Grandfather    • Hypertension Father      Past Surgical History  "  Procedure Laterality Date   • Finger orif  6/24/2014     Performed by Ed Espitia M.D. at SUR[facility]ERY [facility]   • Percutaneous pinning  6/24/2014     Performed by Ed Espitia M.D. at SUR[facility]ERY [facility]     Nkda          Gynecologic Exam  Pertinent negatives include no abdominal pain, constipation, diarrhea, dysuria, frequency or urgency.       Review of Systems   Constitutional: Negative.    HENT: Negative for congestion and nosebleeds.    Eyes: Negative for blurred vision, double vision and photophobia.   Respiratory: Negative for sputum production and shortness of breath.    Cardiovascular: Negative for chest pain and palpitations.   Gastrointestinal: Negative for abdominal pain, diarrhea and constipation.   Genitourinary: Negative for dysuria, urgency and frequency.   Musculoskeletal: Negative for myalgias.   Skin: Negative.    Psychiatric/Behavioral: Negative for depression, suicidal ideas, hallucinations and substance abuse. The patient is not nervous/anxious.           Objective:     /80 mmHg  Ht 1.499 m (4' 11.02\")  Wt 69.854 kg (154 lb)  BMI 31.09 kg/m2  LMP 04/03/2017     Physical Exam   Constitutional: She is oriented to person, place, and time. She appears well-developed and well-nourished.   HENT:   Head: Normocephalic and atraumatic.   Eyes: Conjunctivae and EOM are normal. Pupils are equal, round, and reactive to light.   Neck: Normal range of motion. Neck supple. No thyromegaly present.   Cardiovascular: Normal rate, regular rhythm and normal heart sounds.    Pulmonary/Chest: Effort normal and breath sounds normal.   Abdominal: Soft. Bowel sounds are normal.   Musculoskeletal: Normal range of motion.   Neurological: She is alert and oriented to person, place, and time. She has normal reflexes.   Skin: Skin is warm and dry.   Psychiatric: She has a normal mood and affect. Her behavior is normal. Judgment and thought content normal.   Nursing note and vitals " reviewed.              Assessment/Plan:     1. Well woman exam    - THINPREP RFLX HPV ASCUS W/CTNG; Future  - POCT Urinalysis    2. Routine cervical smear    - THINPREP RFLX HPV ASCUS W/CTNG; Future  - POCT Urinalysis    3. Screening examination for venereal disease    - THINPREP RFLX HPV ASCUS W/CTNG; Future  - HIV ANTIBODIES; Future  - HEP B SURFACE ANTIGEN; Future  - RPR QUAL W/REFLEX

## 2017-05-11 LAB
C TRACH DNA GENITAL QL NAA+PROBE: NEGATIVE
CYTOLOGY REG CYTOL: NORMAL
N GONORRHOEA DNA GENITAL QL NAA+PROBE: NEGATIVE
SPECIMEN SOURCE: NORMAL

## 2017-05-12 LAB
BACTERIA UR CULT: ABNORMAL
SIGNIFICANT IND 70042: ABNORMAL
SITE SITE: ABNORMAL
SOURCE SOURCE: ABNORMAL

## 2017-06-05 RX ORDER — NITROFURANTOIN 25; 75 MG/1; MG/1
100 CAPSULE ORAL 2 TIMES DAILY
Qty: 14 CAP | Refills: 0 | OUTPATIENT
Start: 2017-06-05 | End: 2017-06-12

## 2017-07-27 ENCOUNTER — TELEPHONE (OUTPATIENT)
Dept: MEDICAL GROUP | Facility: MEDICAL CENTER | Age: 22
End: 2017-07-27

## 2017-07-27 NOTE — TELEPHONE ENCOUNTER
Future Appointments       Provider Department Center    8/1/2017 1:20 PM Dave Sanchez M.D. [facility] [address]        ESTABLISHED PATIENT PRE-VISIT PLANNING     Note: Patient will not be contacted if there is no indication to call.     1.  Reviewed note from last office visit with PCP and/or other med group provider: Yes    2.  If any orders were placed at last visit, do we have Results/Consult Notes?        •  Labs - Labs were not ordered at last office visit.       •  Imaging - Imaging was not ordered at last office visit.       •  Referrals - No referrals were ordered at last office visit.    3.  Immunizations were updated in Epic using WebIZ?: Yes       •  Web Iz Recommendations: Patient is up to date on all vaccines    4.  Patient is due for the following Health Maintenance Topics:   There are no preventive care reminders to display for this patient.    - Patient is up-to-date on all Health Maintenance topics. No records have been requested at this time.    5.  Patient was not informed to arrive 15 min prior to their scheduled appointment and bring in their medication bottles.

## 2017-08-01 ENCOUNTER — HOSPITAL ENCOUNTER (OUTPATIENT)
Dept: LAB | Facility: MEDICAL CENTER | Age: 22
End: 2017-08-01
Attending: INTERNAL MEDICINE
Payer: COMMERCIAL

## 2017-08-01 ENCOUNTER — OFFICE VISIT (OUTPATIENT)
Dept: MEDICAL GROUP | Facility: MEDICAL CENTER | Age: 22
End: 2017-08-01
Payer: COMMERCIAL

## 2017-08-01 VITALS
RESPIRATION RATE: 16 BRPM | TEMPERATURE: 98.2 F | SYSTOLIC BLOOD PRESSURE: 118 MMHG | WEIGHT: 155.6 LBS | HEART RATE: 80 BPM | HEIGHT: 59 IN | BODY MASS INDEX: 31.37 KG/M2 | DIASTOLIC BLOOD PRESSURE: 72 MMHG | OXYGEN SATURATION: 100 %

## 2017-08-01 DIAGNOSIS — E66.9 OBESITY (BMI 30.0-34.9): ICD-10-CM

## 2017-08-01 DIAGNOSIS — J02.9 SORE THROAT: ICD-10-CM

## 2017-08-01 DIAGNOSIS — R53.83 FATIGUE, UNSPECIFIED TYPE: ICD-10-CM

## 2017-08-01 DIAGNOSIS — M79.2 NERVE PAIN: ICD-10-CM

## 2017-08-01 PROBLEM — E66.811 OBESITY (BMI 30.0-34.9): Status: ACTIVE | Noted: 2017-08-01

## 2017-08-01 LAB
BASOPHILS # BLD AUTO: 0.2 % (ref 0–1.8)
BASOPHILS # BLD: 0.02 K/UL (ref 0–0.12)
EOSINOPHIL # BLD AUTO: 0.15 K/UL (ref 0–0.51)
EOSINOPHIL NFR BLD: 1.8 % (ref 0–6.9)
ERYTHROCYTE [DISTWIDTH] IN BLOOD BY AUTOMATED COUNT: 42.8 FL (ref 35.9–50)
FERRITIN SERPL-MCNC: 31.1 NG/ML (ref 10–291)
FOLATE SERPL-MCNC: 16 NG/ML
HCT VFR BLD AUTO: 42.9 % (ref 37–47)
HGB BLD-MCNC: 14.3 G/DL (ref 12–16)
IMM GRANULOCYTES # BLD AUTO: 0.03 K/UL (ref 0–0.11)
IMM GRANULOCYTES NFR BLD AUTO: 0.4 % (ref 0–0.9)
INT CON NEG: NEGATIVE
INT CON POS: POSITIVE
LYMPHOCYTES # BLD AUTO: 2.41 K/UL (ref 1–4.8)
LYMPHOCYTES NFR BLD: 28.1 % (ref 22–41)
MCH RBC QN AUTO: 31.1 PG (ref 27–33)
MCHC RBC AUTO-ENTMCNC: 33.3 G/DL (ref 33.6–35)
MCV RBC AUTO: 93.3 FL (ref 81.4–97.8)
MONOCYTES # BLD AUTO: 0.49 K/UL (ref 0–0.85)
MONOCYTES NFR BLD AUTO: 5.7 % (ref 0–13.4)
NEUTROPHILS # BLD AUTO: 5.47 K/UL (ref 2–7.15)
NEUTROPHILS NFR BLD: 63.8 % (ref 44–72)
NRBC # BLD AUTO: 0 K/UL
NRBC BLD AUTO-RTO: 0 /100 WBC
PLATELET # BLD AUTO: 256 K/UL (ref 164–446)
PMV BLD AUTO: 10.4 FL (ref 9–12.9)
RBC # BLD AUTO: 4.6 M/UL (ref 4.2–5.4)
S PYO AG THROAT QL: NEGATIVE
TSH SERPL DL<=0.005 MIU/L-ACNC: 0.92 UIU/ML (ref 0.3–3.7)
VIT B12 SERPL-MCNC: 427 PG/ML (ref 211–911)
WBC # BLD AUTO: 8.6 K/UL (ref 4.8–10.8)

## 2017-08-01 PROCEDURE — 84443 ASSAY THYROID STIM HORMONE: CPT

## 2017-08-01 PROCEDURE — 85025 COMPLETE CBC W/AUTO DIFF WBC: CPT

## 2017-08-01 PROCEDURE — 82607 VITAMIN B-12: CPT

## 2017-08-01 PROCEDURE — 82728 ASSAY OF FERRITIN: CPT

## 2017-08-01 PROCEDURE — 87880 STREP A ASSAY W/OPTIC: CPT | Performed by: INTERNAL MEDICINE

## 2017-08-01 PROCEDURE — 36415 COLL VENOUS BLD VENIPUNCTURE: CPT

## 2017-08-01 PROCEDURE — 86780 TREPONEMA PALLIDUM: CPT

## 2017-08-01 PROCEDURE — 99214 OFFICE O/P EST MOD 30 MIN: CPT | Performed by: INTERNAL MEDICINE

## 2017-08-01 PROCEDURE — 82746 ASSAY OF FOLIC ACID SERUM: CPT

## 2017-08-01 RX ORDER — ALBUTEROL SULFATE 90 UG/1
2 AEROSOL, METERED RESPIRATORY (INHALATION) EVERY 6 HOURS PRN
Qty: 8.5 G | Refills: 11 | Status: SHIPPED | OUTPATIENT
Start: 2017-08-01 | End: 2020-08-07 | Stop reason: SDUPTHER

## 2017-08-01 RX ORDER — AMOXICILLIN AND CLAVULANATE POTASSIUM 875; 125 MG/1; MG/1
1 TABLET, FILM COATED ORAL 2 TIMES DAILY
Qty: 14 TAB | Refills: 0 | Status: SHIPPED | OUTPATIENT
Start: 2017-08-01 | End: 2017-08-08

## 2017-08-01 RX ORDER — ALBUTEROL SULFATE 90 UG/1
2 AEROSOL, METERED RESPIRATORY (INHALATION) EVERY 6 HOURS PRN
Qty: 8.5 G | Refills: 11 | Status: SHIPPED | OUTPATIENT
Start: 2017-08-01 | End: 2017-08-01

## 2017-08-01 RX ORDER — CITALOPRAM 40 MG/1
1 TABLET ORAL DAILY
COMMUNITY
Start: 2017-07-31 | End: 2018-01-23

## 2017-08-01 NOTE — MR AVS SNAPSHOT
"        Isa Whitmore Willard   2017 1:20 PM   Office Visit   MRN: [id number]    Department:  [address] [place] [address]   Dept Phone:  [phone]    Description:  Female : 1995   Provider:  Dave Sanchez M.D.           Reason for Visit     Follow-Up     Medication Refill refill albuterol inhaler      Allergies as of 2017     Allergen Noted Reactions    Nkda [No Known Drug Allergy] 2009         You were diagnosed with     Nerve pain   [764243]       Sore throat   [621302]       Obesity (BMI 30.0-34.9)   [144419]       Fatigue, unspecified type   [6767741]         Vital Signs     Blood Pressure Pulse Temperature Respirations Height Weight    118/72 mmHg 80 36.8 °C (98.2 °F) 16 1.499 m (4' 11.02\") 70.58 kg (155 lb 9.6 oz)    Body Mass Index Oxygen Saturation Smoking Status             31.41 kg/m2 100% Former Smoker         Basic Information     Date Of Birth Sex Race Ethnicity Preferred Language    1995 Female  or   Origin (Cymro,Fijian,Kenyan,South Sudanese, etc) English      Problem List              ICD-10-CM Priority Class Noted - Resolved    Non morbid obesity due to excess calories E66.09   2016 - Present    Dysthymia F34.1   2016 - Present    Gastroesophageal reflux disease with esophagitis K21.0   3/21/2017 - Present    Obesity (BMI 30.0-34.9) E66.9   2017 - Present      Health Maintenance        Date Due Completion Dates    IMM INFLUENZA (1) 2017 10/17/2016, 10/16/2015, 2010, 10/30/2009    PAP SMEAR 2020, 2016, 2015    IMM DTaP/Tdap/Td Vaccine (8 - Td) 2023, 2006, 3/10/1998, 1995, 5/10/1994, 3/9/1994, 1994            Current Immunizations     Dtap Vaccine 3/10/1998, 1995, 5/10/1994, 3/9/1994, 1994    HIB Vaccine (ACTHIB/HIBERIX) 1995, 5/10/1994, 3/9/1994, 1994    HPV Quadrivalent Vaccine (GARDASIL) 3/24/2011, 3/24/2011, 2010, 2009    Hepatitis A Vaccine, " Ped/Adol 1/5/2006, 6/15/2005    Hepatitis B Vaccine Non-Recombivax (Ped/Adol) 5/10/1994, 3/9/1994, 1/13/1994    Influenza LAIV (Nasal) 11/19/2010, 10/30/2009    Influenza Vaccine Quad Inj (Pf) 10/16/2015    Influenza Vaccine Quad Inj (Preserved) 10/17/2016  3:05 PM    MMR Vaccine 3/10/1998, 2/1/1995    Meningococcal Conjugate Vaccine MCV4 (Menveo) 5/21/2009    OPV - Historical Data 3/10/1998, 5/10/1994, 3/9/1994, 1/13/1994    Tdap Vaccine 9/26/2013 11:20 PM, 1/5/2006    Tuberculin Skin Test 12/24/2014 12:15 PM, 12/17/2014    Varicella Vaccine Live 8/1/2006, 3/10/1998      Below and/or attached are the medications your provider expects you to take. Review all of your home medications and newly ordered medications with your provider and/or pharmacist. Follow medication instructions as directed by your provider and/or pharmacist. Please keep your medication list with you and share with your provider. Update the information when medications are discontinued, doses are changed, or new medications (including over-the-counter products) are added; and carry medication information at all times in the event of emergency situations     Allergies:  NKDA - (reactions not documented)               Medications  Valid as of: August 01, 2017 -  1:47 PM    Generic Name Brand Name Tablet Size Instructions for use    Albuterol Sulfate (Aero Soln) albuterol 108 (90 BASE) MCG/ACT Inhale 2 Puffs by mouth every 6 hours as needed for Shortness of Breath.        Amoxicillin-Pot Clavulanate (Tab) AUGMENTIN 875-125 MG Take 1 Tab by mouth 2 times a day for 7 days.        Citalopram Hydrobromide (Tab) CELEXA 20 MG Take 2 Tabs by mouth every day.        Citalopram Hydrobromide (Tab) CELEXA 40 MG Take 1 Tab by mouth every day.        Lidocaine (Patch) LIDODERM 5 % Apply 1 Patch to skin as directed every 24 hours.        Omeprazole (CAPSULE DELAYED RELEASE) PRILOSEC 40 MG Take 1 Cap by mouth every day.        .                 Medicines prescribed  today were sent to:     [facility]/PHARMACY #[id number] - [place], [place] - [address]    [address] [place] [address]    Phone: [phone] Fax: [phone]    Open 24 Hours?: No      Medication refill instructions:       If your prescription bottle indicates you have medication refills left, it is not necessary to call your provider’s office. Please contact your pharmacy and they will refill your medication.    If your prescription bottle indicates you do not have any refills left, you may request refills at any time through one of the following ways: The online [employer] system (except Urgent Care), by calling your provider’s office, or by asking your pharmacy to contact your provider’s office with a refill request. Medication refills are processed only during regular business hours and may not be available until the next business day. Your provider may request additional information or to have a follow-up visit with you prior to refilling your medication.   *Please Note: Medication refills are assigned a new Rx number when refilled electronically. Your pharmacy may indicate that no refills were authorized even though a new prescription for the same medication is available at the pharmacy. Please request the medicine by name with the pharmacy before contacting your provider for a refill.        Your To Do List     Future Labs/Procedures Complete By Expires    CBC WITH DIFFERENTIAL  As directed 8/2/2018    FERRITIN  As directed 8/2/2018    FOLATE  As directed 8/2/2018    TSH  As directed 8/2/2018    VITAMIN B12  As directed 8/2/2018      Other Notes About Your Plan     GIRL = name will be surprise           MyChart Access Code: Activation code not generated  Current [employer] Status: Active

## 2017-08-01 NOTE — PROGRESS NOTES
"CC: Follow-up multiple issues    HPI:   Isa presents today with the following.    1. Nerve pain  Presents complaining of bilateral numbness and nerve burning pain in her feet. Sports been going on for some time and is intermittent in nature. No history of diabetes.    2. Sore throat  Thank complaint is sore throat and cough the past 2 weeks. Cough is improved but throat is still somewhat swollen. She denies any fevers or chills no earache.    3. Obesity (BMI 30.0-34.9)  Weight persistently elevated she should work on diet and exercise.    4. Fatigue, unspecified type  She is feeling persistent generalized fatigue and partly due to current infection.      Patient Active Problem List    Diagnosis Date Noted   • Obesity (BMI 30.0-34.9) 08/01/2017   • Gastroesophageal reflux disease with esophagitis 03/21/2017   • Non morbid obesity due to excess calories 11/08/2016   • Dysthymia 11/08/2016       Current Outpatient Prescriptions   Medication Sig Dispense Refill   • citalopram (CELEXA) 40 MG Tab Take 1 Tab by mouth every day.     • amoxicillin-clavulanate (AUGMENTIN) 875-125 MG Tab Take 1 Tab by mouth 2 times a day for 7 days. 14 Tab 0   • albuterol 108 (90 BASE) MCG/ACT Aero Soln inhalation aerosol Inhale 2 Puffs by mouth every 6 hours as needed for Shortness of Breath. 8.5 g 11   • omeprazole (PRILOSEC) 40 MG delayed-release capsule Take 1 Cap by mouth every day. 30 Cap 6   • lidocaine (LIDODERM) 5 % Patch Apply 1 Patch to skin as directed every 24 hours. 30 Patch 1   • citalopram (CELEXA) 20 MG Tab Take 2 Tabs by mouth every day. (Patient not taking: Reported on 8/1/2017) 60 Tab 6     No current facility-administered medications for this visit.         Allergies as of 08/01/2017 - Luis as Reviewed 08/01/2017   Allergen Reaction Noted   • Nkda [no known drug allergy]  07/23/2009        ROS: As per HPI.    /72 mmHg  Pulse 80  Temp(Src) 36.8 °C (98.2 °F)  Resp 16  Ht 1.499 m (4' 11.02\")  Wt 70.58 kg (155 lb " 9.6 oz)  BMI 31.41 kg/m2  SpO2 100%    Physical Exam:  Gen:         Alert and oriented, No apparent distress.  Heent:       TMs clear bilaterally, oropharynx without erythema or exudates tonsils are 2+   Neck:        No Lymphadenopathy or Bruits.  Lungs:     Clear to auscultation bilaterally  CV:          Regular rate and rhythm. No murmurs, rubs or gallops.               Ext:          No clubbing, cyanosis, edema.      Assessment and Plan.   21 y.o. female with the following issues.    1. Nerve pain  Have sent for blood work follow-up for abnormalities.  - POCT Rapid Strep A  - CBC WITH DIFFERENTIAL; Future  - VITAMIN B12; Future  - FOLATE; Future  - FERRITIN; Future  - RPR TITER+TP-PA REFLEX    2. Sore throat  Rapid strep is negative but given the size of her tonsils if not improving next few days she will start on Augmentin. Symptoms still not clearing will follow-up.    3. Obesity (BMI 30.0-34.9)  Discussed diet exercise and weight loss strategies. Have set a goal for 15 pounds in 3 months and will followup at that time.  - Patient identified as having weight management issue.  Appropriate orders and counseling given.    4. Fatigue, unspecified type    - TSH; Future

## 2017-08-04 LAB — T PALLIDUM AB SER QL AGGL: NON REACTIVE

## 2017-09-02 ENCOUNTER — HOSPITAL ENCOUNTER (OUTPATIENT)
Dept: LAB | Facility: MEDICAL CENTER | Age: 22
End: 2017-09-02
Payer: COMMERCIAL

## 2017-09-02 LAB
BDY FAT % MEASURED: 32.2 %
BP DIAS: 56 MMHG
BP SYS: 119 MMHG
CHOLEST SERPL-MCNC: 140 MG/DL (ref 100–199)
DIABETES HTDIA: NO
EVENT NAME HTEVT: NORMAL
FASTING HTFAS: YES
GLUCOSE SERPL-MCNC: 89 MG/DL (ref 65–99)
HDLC SERPL-MCNC: 50 MG/DL
HYPERTENSION HTHYP: NO
LDLC SERPL CALC-MCNC: 81 MG/DL
SCREENING LOC CITY HTCIT: NORMAL
SCREENING LOC STATE HTSTA: NORMAL
SCREENING LOCATION HTLOC: NORMAL
SMOKING HTSMO: NO
SUBSCRIBER ID HTSID: NORMAL
TRIGL SERPL-MCNC: 47 MG/DL (ref 0–149)

## 2017-09-02 PROCEDURE — 82947 ASSAY GLUCOSE BLOOD QUANT: CPT

## 2017-09-02 PROCEDURE — 80061 LIPID PANEL: CPT

## 2017-09-02 PROCEDURE — S5190 WELLNESS ASSESSMENT BY NONPH: HCPCS

## 2017-09-02 PROCEDURE — 36415 COLL VENOUS BLD VENIPUNCTURE: CPT

## 2017-09-19 DIAGNOSIS — Z23 FLU VACCINE NEED: ICD-10-CM

## 2017-09-19 PROCEDURE — 90471 IMMUNIZATION ADMIN: CPT | Performed by: NURSE PRACTITIONER

## 2017-09-19 PROCEDURE — 90686 IIV4 VACC NO PRSV 0.5 ML IM: CPT | Performed by: NURSE PRACTITIONER

## 2018-01-23 ENCOUNTER — OFFICE VISIT (OUTPATIENT)
Dept: MEDICAL GROUP | Facility: MEDICAL CENTER | Age: 23
End: 2018-01-23
Payer: COMMERCIAL

## 2018-01-23 VITALS
HEART RATE: 92 BPM | OXYGEN SATURATION: 95 % | BODY MASS INDEX: 33.63 KG/M2 | TEMPERATURE: 97.5 F | SYSTOLIC BLOOD PRESSURE: 98 MMHG | RESPIRATION RATE: 16 BRPM | DIASTOLIC BLOOD PRESSURE: 68 MMHG | WEIGHT: 166.8 LBS | HEIGHT: 59 IN

## 2018-01-23 DIAGNOSIS — R53.83 FATIGUE, UNSPECIFIED TYPE: ICD-10-CM

## 2018-01-23 DIAGNOSIS — E66.9 OBESITY (BMI 30.0-34.9): ICD-10-CM

## 2018-01-23 DIAGNOSIS — F41.9 ANXIETY: ICD-10-CM

## 2018-01-23 DIAGNOSIS — F51.01 PRIMARY INSOMNIA: ICD-10-CM

## 2018-01-23 PROCEDURE — 99214 OFFICE O/P EST MOD 30 MIN: CPT | Performed by: INTERNAL MEDICINE

## 2018-01-23 RX ORDER — ZOLPIDEM TARTRATE 5 MG/1
5 TABLET ORAL NIGHTLY PRN
Qty: 15 TAB | Refills: 0 | Status: SHIPPED | OUTPATIENT
Start: 2018-01-23 | End: 2018-02-27 | Stop reason: SDUPTHER

## 2018-01-23 RX ORDER — BUSPIRONE HYDROCHLORIDE 10 MG/1
10 TABLET ORAL 2 TIMES DAILY
Qty: 60 TAB | Refills: 6 | Status: SHIPPED | OUTPATIENT
Start: 2018-01-23 | End: 2018-05-29

## 2018-01-23 NOTE — PROGRESS NOTES
"CC: Anxiety and insomnia.    HPI:   Isa presents today with the following.    1. Anxiety  Presents back after being started on Celexa reporting no improvement in her anxiety. She reports she tolerated medication well. She does have some mild depressive symptoms but reports is because of her anxiety. Denies any suicidal ideation.    2. Primary insomnia  She does report severe difficulty falling asleep as well as staying asleep. She does report morning fatigue even when she is rested. She does not know if she snores or not.    3. Obesity (BMI 30.0-34.9)  Weight is persistently elevated his eczema gone up since last visit because of her fatigue and other issues. She denies any chest pain or shortness breath no barriers to activity.          Patient Active Problem List    Diagnosis Date Noted   • Anxiety 01/23/2018   • Obesity (BMI 30.0-34.9) 08/01/2017   • Gastroesophageal reflux disease with esophagitis 03/21/2017   • Dysthymia 11/08/2016       Current Outpatient Prescriptions   Medication Sig Dispense Refill   • busPIRone (BUSPAR) 10 MG Tab Take 1 Tab by mouth 2 times a day. 60 Tab 6   • zolpidem (AMBIEN) 5 MG Tab Take 1 Tab by mouth at bedtime as needed for Sleep for up to 30 days. 15 Tab 0   • albuterol 108 (90 BASE) MCG/ACT Aero Soln inhalation aerosol Inhale 2 Puffs by mouth every 6 hours as needed for Shortness of Breath. 8.5 g 11   • omeprazole (PRILOSEC) 40 MG delayed-release capsule Take 1 Cap by mouth every day. 30 Cap 6   • lidocaine (LIDODERM) 5 % Patch Apply 1 Patch to skin as directed every 24 hours. 30 Patch 1     No current facility-administered medications for this visit.          Allergies as of 01/23/2018 - Reviewed 01/23/2018   Allergen Reaction Noted   • Nkda [no known drug allergy]  07/23/2009        ROS: As per HPI.    BP (!) 98/68   Pulse 92   Temp 36.4 °C (97.5 °F)   Resp 16   Ht 1.499 m (4' 11\")   Wt 75.7 kg (166 lb 12.8 oz)   SpO2 95%   BMI 33.69 kg/m²     Physical Exam:  Gen:       "   Alert and oriented, No apparent distress.  Neck:        No Lymphadenopathy or Bruits.  Lungs:     Clear to auscultation bilaterally  CV:          Regular rate and rhythm. No murmurs, rubs or gallops.               Ext:          No clubbing, cyanosis, edema.      Assessment and Plan.   22 y.o. female with the following issues.    1. Anxiety  Placing on new medication below referral to psychiatry. Have cost him about side effects medication.  - REFERRAL TO PSYCHIATRY  - busPIRone (BUSPAR) 10 MG Tab; Take 1 Tab by mouth 2 times a day.  Dispense: 60 Tab; Refill: 6    2. Primary insomnia  Have given 15 pills suggested taking for the next 5 days to try and regulate sleep cycle and then when necessary thereafter. Cautioned about side effects and will see back in one month.  Have placed a referral to pulmonology for evaluation for sleep apnea as well as blood work to evaluate restless legs.  - zolpidem (AMBIEN) 5 MG Tab; Take 1 Tab by mouth at bedtime as needed for Sleep for up to 30 days.  Dispense: 15 Tab; Refill: 0    3. Obesity (BMI 30.0-34.9)  Discussion about diet and exercise  - Patient identified as having weight management issue.  Appropriate orders and counseling given.

## 2018-02-17 ENCOUNTER — HOSPITAL ENCOUNTER (OUTPATIENT)
Dept: LAB | Facility: MEDICAL CENTER | Age: 23
End: 2018-02-17
Attending: OBSTETRICS & GYNECOLOGY
Payer: COMMERCIAL

## 2018-02-17 ENCOUNTER — HOSPITAL ENCOUNTER (OUTPATIENT)
Dept: LAB | Facility: MEDICAL CENTER | Age: 23
End: 2018-02-17
Attending: INTERNAL MEDICINE
Payer: COMMERCIAL

## 2018-02-17 DIAGNOSIS — Z11.3 SCREENING EXAMINATION FOR VENEREAL DISEASE: ICD-10-CM

## 2018-02-17 DIAGNOSIS — N30.00 ACUTE CYSTITIS WITHOUT HEMATURIA: ICD-10-CM

## 2018-02-17 DIAGNOSIS — R53.83 FATIGUE, UNSPECIFIED TYPE: ICD-10-CM

## 2018-02-17 LAB
APPEARANCE UR: ABNORMAL
BACTERIA #/AREA URNS HPF: ABNORMAL /HPF
BASOPHILS # BLD AUTO: 0.2 % (ref 0–1.8)
BASOPHILS # BLD: 0.02 K/UL (ref 0–0.12)
BILIRUB UR QL STRIP.AUTO: NEGATIVE
COLOR UR: YELLOW
CULTURE IF INDICATED INDCX: YES UA CULTURE
EOSINOPHIL # BLD AUTO: 0.17 K/UL (ref 0–0.51)
EOSINOPHIL NFR BLD: 2.1 % (ref 0–6.9)
EPI CELLS #/AREA URNS HPF: ABNORMAL /HPF
ERYTHROCYTE [DISTWIDTH] IN BLOOD BY AUTOMATED COUNT: 42.6 FL (ref 35.9–50)
FERRITIN SERPL-MCNC: 21.7 NG/ML (ref 10–291)
GLUCOSE UR STRIP.AUTO-MCNC: NEGATIVE MG/DL
HBV SURFACE AG SER QL: NEGATIVE
HCT VFR BLD AUTO: 44.1 % (ref 37–47)
HGB BLD-MCNC: 14.5 G/DL (ref 12–16)
HIV 1+2 AB+HIV1 P24 AG SERPL QL IA: NON REACTIVE
HYALINE CASTS #/AREA URNS LPF: ABNORMAL /LPF
IMM GRANULOCYTES # BLD AUTO: 0.02 K/UL (ref 0–0.11)
IMM GRANULOCYTES NFR BLD AUTO: 0.2 % (ref 0–0.9)
IRON SATN MFR SERPL: 16 % (ref 15–55)
IRON SERPL-MCNC: 75 UG/DL (ref 40–170)
KETONES UR STRIP.AUTO-MCNC: NEGATIVE MG/DL
LEUKOCYTE ESTERASE UR QL STRIP.AUTO: ABNORMAL
LYMPHOCYTES # BLD AUTO: 2.48 K/UL (ref 1–4.8)
LYMPHOCYTES NFR BLD: 30.5 % (ref 22–41)
MCH RBC QN AUTO: 30.9 PG (ref 27–33)
MCHC RBC AUTO-ENTMCNC: 32.9 G/DL (ref 33.6–35)
MCV RBC AUTO: 93.8 FL (ref 81.4–97.8)
MICRO URNS: ABNORMAL
MONOCYTES # BLD AUTO: 0.62 K/UL (ref 0–0.85)
MONOCYTES NFR BLD AUTO: 7.6 % (ref 0–13.4)
NEUTROPHILS # BLD AUTO: 4.83 K/UL (ref 2–7.15)
NEUTROPHILS NFR BLD: 59.4 % (ref 44–72)
NITRITE UR QL STRIP.AUTO: NEGATIVE
NRBC # BLD AUTO: 0 K/UL
NRBC BLD-RTO: 0 /100 WBC
PH UR STRIP.AUTO: 5 [PH]
PLATELET # BLD AUTO: 268 K/UL (ref 164–446)
PMV BLD AUTO: 10 FL (ref 9–12.9)
PROT UR QL STRIP: NEGATIVE MG/DL
RBC # BLD AUTO: 4.7 M/UL (ref 4.2–5.4)
RBC # URNS HPF: ABNORMAL /HPF
RBC UR QL AUTO: NEGATIVE
SP GR UR STRIP.AUTO: 1.02
TIBC SERPL-MCNC: 466 UG/DL (ref 250–450)
TREPONEMA PALLIDUM IGG+IGM AB [PRESENCE] IN SERUM OR PLASMA BY IMMUNOASSAY: NON REACTIVE
TREPONEMA PALLIDUM IGG+IGM AB [PRESENCE] IN SERUM OR PLASMA BY IMMUNOASSAY: NON REACTIVE
UROBILINOGEN UR STRIP.AUTO-MCNC: 0.2 MG/DL
WBC # BLD AUTO: 8.1 K/UL (ref 4.8–10.8)
WBC #/AREA URNS HPF: ABNORMAL /HPF

## 2018-02-17 PROCEDURE — 86780 TREPONEMA PALLIDUM: CPT | Mod: 91

## 2018-02-17 PROCEDURE — 81001 URINALYSIS AUTO W/SCOPE: CPT

## 2018-02-17 PROCEDURE — 82728 ASSAY OF FERRITIN: CPT

## 2018-02-17 PROCEDURE — 87340 HEPATITIS B SURFACE AG IA: CPT

## 2018-02-17 PROCEDURE — 87389 HIV-1 AG W/HIV-1&-2 AB AG IA: CPT

## 2018-02-17 PROCEDURE — 83550 IRON BINDING TEST: CPT

## 2018-02-17 PROCEDURE — 87086 URINE CULTURE/COLONY COUNT: CPT

## 2018-02-17 PROCEDURE — 85025 COMPLETE CBC W/AUTO DIFF WBC: CPT

## 2018-02-17 PROCEDURE — 86780 TREPONEMA PALLIDUM: CPT

## 2018-02-17 PROCEDURE — 83540 ASSAY OF IRON: CPT

## 2018-02-17 PROCEDURE — 36415 COLL VENOUS BLD VENIPUNCTURE: CPT

## 2018-02-19 LAB
BACTERIA UR CULT: NORMAL
SIGNIFICANT IND 70042: NORMAL
SITE SITE: NORMAL
SOURCE SOURCE: NORMAL

## 2018-02-27 ENCOUNTER — OFFICE VISIT (OUTPATIENT)
Dept: MEDICAL GROUP | Facility: MEDICAL CENTER | Age: 23
End: 2018-02-27
Payer: COMMERCIAL

## 2018-02-27 VITALS
BODY MASS INDEX: 33.87 KG/M2 | SYSTOLIC BLOOD PRESSURE: 110 MMHG | OXYGEN SATURATION: 95 % | WEIGHT: 168 LBS | DIASTOLIC BLOOD PRESSURE: 68 MMHG | TEMPERATURE: 97.8 F | HEART RATE: 92 BPM | RESPIRATION RATE: 16 BRPM | HEIGHT: 59 IN

## 2018-02-27 DIAGNOSIS — F41.9 ANXIETY: ICD-10-CM

## 2018-02-27 DIAGNOSIS — F51.01 PRIMARY INSOMNIA: ICD-10-CM

## 2018-02-27 PROCEDURE — 99214 OFFICE O/P EST MOD 30 MIN: CPT | Performed by: INTERNAL MEDICINE

## 2018-02-27 RX ORDER — ZOLPIDEM TARTRATE 5 MG/1
5 TABLET ORAL NIGHTLY PRN
Qty: 15 TAB | Refills: 3 | Status: SHIPPED | OUTPATIENT
Start: 2018-02-27 | End: 2018-03-29

## 2018-02-27 ASSESSMENT — PATIENT HEALTH QUESTIONNAIRE - PHQ9
5. POOR APPETITE OR OVEREATING: 1 - SEVERAL DAYS
CLINICAL INTERPRETATION OF PHQ2 SCORE: 2
SUM OF ALL RESPONSES TO PHQ QUESTIONS 1-9: 6

## 2018-02-27 NOTE — PROGRESS NOTES
"CC: Follow-up anxiety and insomnia.    HPI:   Isa presents today with the following.    1. Primary insomnia  Presents reporting she is taking zolpidem proximal twice per week and it is helpful for sleep. Unfortunately her work hours she does need to take it slightly more often. She does report she feels well rested on day she gets a full night sleep.    2. Anxiety  Anxiety is improved also maintained on Wellbutrin 10 mg twice a day denying any major side effects. No severe anxiety attacks. She was referred to psychology and psychiatry however is not heard from our referral department so she is not scheduled appointment. Denies any major suicidal ideation or other issues.      Patient Active Problem List    Diagnosis Date Noted   • Anxiety 01/23/2018   • Obesity (BMI 30.0-34.9) 08/01/2017   • Gastroesophageal reflux disease with esophagitis 03/21/2017   • Dysthymia 11/08/2016       Current Outpatient Prescriptions   Medication Sig Dispense Refill   • zolpidem (AMBIEN) 5 MG Tab Take 1 Tab by mouth at bedtime as needed for Sleep for up to 30 days. 15 Tab 3   • busPIRone (BUSPAR) 10 MG Tab Take 1 Tab by mouth 2 times a day. 60 Tab 6   • albuterol 108 (90 BASE) MCG/ACT Aero Soln inhalation aerosol Inhale 2 Puffs by mouth every 6 hours as needed for Shortness of Breath. 8.5 g 11   • omeprazole (PRILOSEC) 40 MG delayed-release capsule Take 1 Cap by mouth every day. 30 Cap 6   • lidocaine (LIDODERM) 5 % Patch Apply 1 Patch to skin as directed every 24 hours. (Patient not taking: Reported on 2/27/2018) 30 Patch 1     No current facility-administered medications for this visit.          Allergies as of 02/27/2018 - Reviewed 02/27/2018   Allergen Reaction Noted   • Nkda [no known drug allergy]  07/23/2009        ROS: Denies Chest pain, SOB, LE edema.    /68   Pulse 92   Temp 36.6 °C (97.8 °F)   Resp 16   Ht 1.499 m (4' 11\")   Wt 76.2 kg (168 lb)   SpO2 95%   BMI 33.93 kg/m²      Physical Exam:  Gen:         " Alert and oriented, No apparent distress.  Neck:        No Lymphadenopathy or Bruits.  Lungs:     Clear to auscultation bilaterally  CV:          Regular rate and rhythm. No murmurs, rubs or gallops.               Ext:          No clubbing, cyanosis, edema.      Assessment and Plan.   22 y.o. female with the following issues.    1. Primary insomnia  Have given permission to take up to 3-4 times per week given refills.  - zolpidem (AMBIEN) 5 MG Tab; Take 1 Tab by mouth at bedtime as needed for Sleep for up to 30 days.  Dispense: 15 Tab; Refill: 3    2. Anxiety  Clinically improving given the number to get into psychiatry and psychology follow-up if worsening otherwise back in 3 months.

## 2018-03-30 ENCOUNTER — SLEEP CENTER VISIT (OUTPATIENT)
Dept: SLEEP MEDICINE | Facility: MEDICAL CENTER | Age: 23
End: 2018-03-30
Payer: COMMERCIAL

## 2018-03-30 VITALS
OXYGEN SATURATION: 95 % | TEMPERATURE: 98.4 F | RESPIRATION RATE: 16 BRPM | WEIGHT: 168 LBS | SYSTOLIC BLOOD PRESSURE: 128 MMHG | HEIGHT: 60 IN | HEART RATE: 97 BPM | BODY MASS INDEX: 32.98 KG/M2 | DIASTOLIC BLOOD PRESSURE: 82 MMHG

## 2018-03-30 DIAGNOSIS — G47.00 INSOMNIA, UNSPECIFIED TYPE: ICD-10-CM

## 2018-03-30 DIAGNOSIS — A74.9 CHLAMYDIA INFECTION: ICD-10-CM

## 2018-03-30 PROCEDURE — 99203 OFFICE O/P NEW LOW 30 MIN: CPT | Performed by: FAMILY MEDICINE

## 2018-03-30 NOTE — PROGRESS NOTES
"     [facility]  Consult Note     Date: 3/30/2018 / Time: 2:42 PM    Patient ID:   Name:             Isa Lamar   YOB: 1995  Age:                 22 y.o.  female   MRN:               [id number]      Thank you for requesting a sleep medicine consultation on Isa Lamar at the sleep center. She presents today with the chief complaints of unable to fall and stay asleep.   HISTORY OF PRESENT ILLNESS:       At night,  Isa Lamar goes to bed around 10-11 pm on weekdays and around on weekends. She gets out of bed at 6:30 am on weekdays and at 7-8 am on weekends.  She  averages 5 hrs of sleep on a good night and 4 hrs on a bad night. Pt has bad nights 3 nights per week. She falls asleep within 40 minutes w/o Ambien and with Ambien. She awakens 3-4 times a night due to no obvious reason. It takes her few min to fall back asleep. During that time She lies in bed.  She  does use the bed only for sleeping. Also, during that time  She  thinks about not getting enough sleep/worries about day-to-day problems.      She is not aware of snoring/witnessed apneas/gasping or choking in sleep.  She  denies any symptoms of restless legs syndrome such as an \"urge to move\"  She  legs in the evening or bedtime. She  denies any symptoms of narcolepsy such as sleep paralysis or cataplexy, or any symptoms to suggest parasomnias such as sleep walking or acting out of dreams. She  has not used any medications for her sleep problem.    Overall,  Magdaryanshu finds her sleep refreshing. When She  wakes up in the morning, She does feel tired.  She  does not take regular naps.     REVIEW OF SYSTEMS:       Constitutional: Denies fevers, Denies weight changes  Eyes: Denies changes in vision, no eye pain  Ears/Nose/Throat/Mouth: Denies nasal congestion or sore throat   Cardiovascular: Denies chest pain or palpitations   Respiratory: Denies shortness of breath , Denies " cough  Gastrointestinal/Hepatic: Denies abdominal pain, nausea, vomiting, diarrhea, constipation or GI bleeding   Genitourinary: Denies bladder dysfunction, dysuria or frequency  Musculoskeletal/Rheum: Denies  joint pain and swelling   Skin/Breast: Denies rash, denies breast lumps or discharge  Neurological: Denies headache, confusion, memory loss or focal weakness/parasthesias  Psychiatric: denies mood disorder     Comprehensive review of systems form is reviewed with the patient and is attached in the EMR.     PMH:  has a past medical history of Anemia (2013); Anxiety (1/23/2018); Dysthymia (11/8/2016); Infectious mononucleosis; and Nausea and vomiting in pregnancy (2/20/2013). She also has no past medical history of Addisons disease (CMS-HCC); Adrenal disorder (CMS-HCC); Allergy; ASTHMA; Blood transfusion; CATARACT; CHF (congestive heart failure) (CMS-HCC); Clotting disorder (CMS-HCC); COPD; Cushings syndrome (CMS-HCC); Diabetes; Diabetic neuropathy (CMS-HCC); EMPHYSEMA; Goiter; Headache(784.0); Heart attack; HIV (human immunodeficiency virus infection); Hyperlipidemia; IBD (inflammatory bowel disease); Kidney disease; Meningitis; Muscle disorder; OSTEOPOROSIS; Parathyroid disorder (CMS-HCC); Pituitary disease (CMS-HCC); Sickle cell disease (CMS-HCC); Substance abuse; Thyroid disease; Ulcer (CMS-HCC); or Urinary tract infection, site not specified.  MEDS:   Current Outpatient Prescriptions:   •  busPIRone (BUSPAR) 10 MG Tab, Take 1 Tab by mouth 2 times a day., Disp: 60 Tab, Rfl: 6  •  omeprazole (PRILOSEC) 40 MG delayed-release capsule, Take 1 Cap by mouth every day., Disp: 30 Cap, Rfl: 6  •  albuterol 108 (90 BASE) MCG/ACT Aero Soln inhalation aerosol, Inhale 2 Puffs by mouth every 6 hours as needed for Shortness of Breath., Disp: 8.5 g, Rfl: 11  •  lidocaine (LIDODERM) 5 % Patch, Apply 1 Patch to skin as directed every 24 hours., Disp: 30 Patch, Rfl: 1  ALLERGIES:   Allergies   Allergen Reactions   • Nkda [No  Known Drug Allergy]      SURGHX:   Past Surgical History:   Procedure Laterality Date   • FINGER ORIF  6/24/2014    Performed by Ed Espitia M.D. at SUR[facility]ERY [facility]   • PERCUTANEOUS PINNING  6/24/2014    Performed by Ed Espitia M.D. at SUR[facility]ERY [facility]     SOCHX:  reports that she quit smoking about 7 years ago. Her smoking use included Cigarettes. She has a 0.10 pack-year smoking history. She has never used smokeless tobacco. She reports that she does not drink alcohol or use drugs..   FH:   Family History   Problem Relation Age of Onset   • Cancer Maternal Aunt      breast cancer    • Hyperlipidemia Maternal Grandfather    • Diabetes Maternal Grandfather      diet control   • Hypertension Maternal Grandfather    • Heart Disease Maternal Grandfather    • Hypertension Father    • Diabetes Maternal Uncle    • Lung Disease Paternal Aunt      asthma           Physical Exam:  Vitals/ General Appearance:   Weight/BMI: Body mass index is 32.81 kg/m².  Blood pressure 128/82, pulse 97, temperature 36.9 °C (98.4 °F), resp. rate 16, height 1.524 m (5'), weight 76.2 kg (168 lb), SpO2 95 %.  Vitals:    03/30/18 1443   BP: 128/82   Pulse: 97   Resp: 16   Temp: 36.9 °C (98.4 °F)   SpO2: 95%   Weight: 76.2 kg (168 lb)   Height: 1.524 m (5')       Pt. is alert and oriented to time, place and person. Cooperative and in no apparent distress.       1. Head: Atraumatic, normocephalic.   2 Nose: No inferior turbinate hypertophy  4. Throat: Oropharynx appears crowded in that the palate is overhanging (Malam Skylar scale 3. Tonsils are enlarged 2+, uvula is large, pharynx not inflamed.  has intact dentition and oral hygiene is fair.  5. Neck: Supple. No thyromegaly  6. Chest: Trachea central  7.Heart auscultation: 1st and 2nd heart sounds normal, regular rhythm. No appreciable murmur.  8. Skin: No rash  9. NEUROLOGICAL EXAMINATION: On neurological exam, the patient was alert and oriented x3. speech was  clear and fluent without dysarthria.      INVESTIGATIONS:       ASSESSMENT AND PLAN     1. The evolution ofinsomnia is discussed in detail. The importance of cognitive restructuring and behavior modification therapy is emphasized for long-term improvement rather than the use of hypnotic agents, which may offer short term relief but may lead to the development of tolerance and side effects with prolonged use. Evening exercise, wind-down before bedtime, and stimulus control (leaving the bed when unable to fall back asleep at night) are explained.    Plan   - Ambien PRN      - Handouts on stimulus control and sleep hygiene are given and a couple of titles of books on insomnia are recommended, written by behavioral psychologists.    - try no to take buspar at night time due to its wakeful promoting properties   - PSG to r/o sleep disorder breathing due to crowded oropharynx     2.. Regarding treatment of other past medical problems and general health maintenance,  She is urged to follow up with PCP.

## 2018-04-10 ENCOUNTER — TELEPHONE (OUTPATIENT)
Dept: SLEEP MEDICINE | Facility: MEDICAL CENTER | Age: 23
End: 2018-04-10

## 2018-04-10 DIAGNOSIS — K92.1 HEMATOCHEZIA: ICD-10-CM

## 2018-04-10 DIAGNOSIS — G47.33 OSA (OBSTRUCTIVE SLEEP APNEA): ICD-10-CM

## 2018-04-13 ENCOUNTER — SLEEP STUDY (OUTPATIENT)
Dept: SLEEP MEDICINE | Facility: MEDICAL CENTER | Age: 23
End: 2018-04-13
Payer: COMMERCIAL

## 2018-04-13 DIAGNOSIS — G47.33 OSA (OBSTRUCTIVE SLEEP APNEA): ICD-10-CM

## 2018-04-13 PROCEDURE — 95810 POLYSOM 6/> YRS 4/> PARAM: CPT | Performed by: FAMILY MEDICINE

## 2018-04-16 NOTE — PROCEDURES
Clinical Comments:  The patient underwent a comprehensive polysomnogram using the standard montage for measurement of parameters of sleep, respiratory events, movement abnormalities, heart rate and rhythm. A microphone was used to monitor snoring.        INTERPRETATION:  Testing began at 10:15:08 PM.  The total recording time was 469.9 minutes with a sleep period of 458.5 minutes and the total sleep time was 443.0 minutes with a sleep efficiency of 94.3%.  The sleep latency was 11.4 minutes, and REM latency was 164.5 minutes.  The patient experienced 51 arousals in total, for an arousal index of 6.9     RESPIRATORY: The patient had 7 apneas in total.  Of these, 7 were obstructive apneas, and 0 were central apneas.  This resulted in an apnea index (AI) of 0.9.  The patient had 51 hypopneas, for a hypopnea index of 6.9.  The overall AHI was 7.9, while the AHI during Stage R sleep was 5.8.  AHI while supine was 13.1.     OXIMETRY: Oxygen saturation monitoring showed a mean SpO2 of 95.5%, with a minimum oxygen saturation of 90.0%.  Oxygen saturations were less than or = 89% for 0.0 minutes of sleep time.     CARDIAC: The highest heart rate during the recording was 124.0 beats per minute.  The average heart rate during sleep was 87.8 bpm.     LIMB MOVEMENTS: There were a total of 30 PLMs during sleep, of which 7 were PLMs arousals.  This resulted in a PLMS index of 4.1.    Technical summary:The patient underwent a diagnostic polysomnogram. This was a 16 channel montage study to include a 6 channel EEG, a 2 channel EOG, and chin EMG, left and right leg EMG, a snore channel, a nasal pressure transducer, and a nasal oral airflow semester.   Respiratory effort was assessed with the use of a thoracic and abdominal monitor and overnight oximetry was obtained. Audio and video recordings were reviewed. This was a fully attended study and sleep stage scoring was performed. The test was technically adequate.     General sleep  summary:  General sleep summary:  During the overnight study, the sleep latency was 11 min which is normal. The REM latency was 155 min which is increased. The total sleep time was 443 min and sleep efficiency was 94 % which is normal.  Sleep stage proportions showed decreased REM and increased N3 .  In regards to sleep quality there was a mild degree of sleep fragmentation as shown by the arousal index of 6 an hourThe arousals were due to spontaneous arousal.    Respiratory summary: During the overnight study, the patient demonstrated mild obstructive sleep apnea as shown by the apnea hypopnea index of 7.9/hr. There was a total of 7 apneas and 51 hypopneas. In the supine position the respiratory disturbance index was 13.1 an hour and in the non-supine position the respiratory disturbance index was 9.2 per hour. The respiratory events were associated with O2 raghu of 90% and averahe O2 saturation of 95%. She spent 90 % of sleep time below 89% O2 saturation. There was snoring. The patient demonstrated a NSR with an average heartbeat of 87 bpm.     Periodic limb movement summary: The patient demonstrated an normal degree of periodic limb movements as shown by the PLM index of 0.3 per hour.      Impression:  1.  Mild Obstructive sleep apnea      Recommendations:  Recommend the patient return for a CPAP titration vs Auto CPAP trial. In some cases alternative treatment options may prove effective in resolving sleep apnea and these options include upper airway surgery, the use of a dental orthotic or weight loss and positional therapy. Clinical correlation is required. In general patients with sleep apnea are advised to avoid alcohol and sedatives and to not operate a motor vehicle while drowsy and are at a greater risk for cardiovascular disease.

## 2018-04-17 ENCOUNTER — GYNECOLOGY VISIT (OUTPATIENT)
Dept: OBGYN | Facility: CLINIC | Age: 23
End: 2018-04-17
Payer: COMMERCIAL

## 2018-04-17 ENCOUNTER — HOSPITAL ENCOUNTER (OUTPATIENT)
Facility: MEDICAL CENTER | Age: 23
End: 2018-04-17
Attending: OBSTETRICS & GYNECOLOGY
Payer: COMMERCIAL

## 2018-04-17 VITALS
HEIGHT: 60 IN | WEIGHT: 172 LBS | DIASTOLIC BLOOD PRESSURE: 70 MMHG | BODY MASS INDEX: 33.77 KG/M2 | SYSTOLIC BLOOD PRESSURE: 112 MMHG

## 2018-04-17 DIAGNOSIS — Z01.419 WELL WOMAN EXAM: ICD-10-CM

## 2018-04-17 DIAGNOSIS — Z11.51 SPECIAL SCREENING EXAMINATION FOR HUMAN PAPILLOMAVIRUS (HPV): ICD-10-CM

## 2018-04-17 DIAGNOSIS — Z12.4 SCREENING FOR CERVICAL CANCER: ICD-10-CM

## 2018-04-17 DIAGNOSIS — Z30.09 FAMILY PLANNING EDUCATION, GUIDANCE, AND COUNSELING: ICD-10-CM

## 2018-04-17 PROCEDURE — 87491 CHLMYD TRACH DNA AMP PROBE: CPT

## 2018-04-17 PROCEDURE — 99395 PREV VISIT EST AGE 18-39: CPT | Performed by: OBSTETRICS & GYNECOLOGY

## 2018-04-17 PROCEDURE — 87591 N.GONORRHOEAE DNA AMP PROB: CPT

## 2018-04-17 PROCEDURE — 87624 HPV HI-RISK TYP POOLED RSLT: CPT

## 2018-04-17 PROCEDURE — 88175 CYTOPATH C/V AUTO FLUID REDO: CPT

## 2018-04-19 ENCOUNTER — TELEPHONE (OUTPATIENT)
Dept: OBGYN | Facility: MEDICAL CENTER | Age: 23
End: 2018-04-19

## 2018-04-19 NOTE — TELEPHONE ENCOUNTER
----- Message from Jenni Zuleta sent at 4/19/2018  9:30 AM PDT -----  Regarding: [facility]  Contact: [phone]  Tanya with [employer] called and states that the ins card they got were blurry and unable to read and she wants to know if we can refax it but this time if we can write a the number on the request form. Thank you.

## 2018-04-26 LAB
HPV HR 12 DNA CVX QL NAA+PROBE: NEGATIVE
HPV16 DNA SPEC QL NAA+PROBE: NEGATIVE
HPV18 DNA SPEC QL NAA+PROBE: NEGATIVE
SPECIMEN SOURCE: NORMAL

## 2018-05-01 ENCOUNTER — SLEEP CENTER VISIT (OUTPATIENT)
Dept: SLEEP MEDICINE | Facility: MEDICAL CENTER | Age: 23
End: 2018-05-01
Payer: COMMERCIAL

## 2018-05-01 VITALS
WEIGHT: 165 LBS | DIASTOLIC BLOOD PRESSURE: 68 MMHG | HEIGHT: 60 IN | OXYGEN SATURATION: 94 % | SYSTOLIC BLOOD PRESSURE: 110 MMHG | BODY MASS INDEX: 32.39 KG/M2 | RESPIRATION RATE: 15 BRPM | HEART RATE: 103 BPM

## 2018-05-01 DIAGNOSIS — G47.33 OSA (OBSTRUCTIVE SLEEP APNEA): ICD-10-CM

## 2018-05-01 DIAGNOSIS — F51.04 CHRONIC INSOMNIA: ICD-10-CM

## 2018-05-01 PROCEDURE — 99213 OFFICE O/P EST LOW 20 MIN: CPT | Performed by: FAMILY MEDICINE

## 2018-05-01 RX ORDER — ESZOPICLONE 1 MG/1
2 TABLET, FILM COATED ORAL
Qty: 30 TAB | Refills: 1 | Status: SHIPPED | OUTPATIENT
Start: 2018-05-01 | End: 2018-05-31

## 2018-05-01 NOTE — PROGRESS NOTES
[facility] Follow Up Note     Date: 5/1/2018 / Time: 10:37 AM    Patient ID:   Name:             Isa Lamar   YOB: 1995  Age:                 22 y.o.  female   MRN:               [id number]      Thank you for requesting a sleep medicine consultation on sIa Lamar at the sleep center. She presents today with the chief complaints of GUERA follow up.     HISTORY OF PRESENT ILLNESS:       Pt is currently not on PAP therapy. She goes to sleep around 10 pm and on gets out of bed at 6:30 am on weekdays and at 7-8 am on weekends.  She  averages 5 hrs of sleep on a good night and 4 hrs on a bad night. The symptoms of excessive daytime and insomnia persist.    PSG mild obstructive sleep apnea as shown by the apnea hypopnea index of 7.9/hr. There was a total of 7 apneas and 51 hypopneas. In the supine position the respiratory disturbance index was 13.1 an hour and in the non-supine position the respiratory disturbance index was 9.2 per hour. The respiratory events were associated with O2 raghu of 90% and averahe O2 saturation of 95%. She spent 90 % of sleep time below 89% O2 saturation.        REVIEW OF SYSTEMS:       Constitutional: Denies fevers, Denies weight changes  Eyes: Denies changes in vision, no eye pain  Ears/Nose/Throat/Mouth: Denies nasal congestion or sore throat   Cardiovascular: Denies chest pain or palpitations   Respiratory: Denies shortness of breath , Denies cough  Gastrointestinal/Hepatic: Denies abdominal pain, nausea, vomiting, diarrhea, constipation or GI bleeding   Genitourinary: Denies bladder dysfunction, dysuria or frequency  Musculoskeletal/Rheum: Denies  joint pain and swelling   Skin/Breast: Denies rash,   Neurological: Denies headache, confusion, memory loss or focal weakness/parasthesias  Psychiatric: denies mood disorder     Comprehensive review of systems form is reviewed with the patient and is attached in the EMR.     PMH:  has a  past medical history of Anemia (2013); Anxiety (1/23/2018); Dysthymia (11/8/2016); Infectious mononucleosis; and Nausea and vomiting in pregnancy (2/20/2013). She also has no past medical history of Addisons disease (HCC); Adrenal disorder (HCC); Allergy; ASTHMA; Blood transfusion; CATARACT; CHF (congestive heart failure) (HCC); Clotting disorder (HCC); COPD; Cushings syndrome (HCC); Diabetes; Diabetic neuropathy (HCC); EMPHYSEMA; Goiter; Headache(784.0); Heart attack (HCC); HIV (human immunodeficiency virus infection); Hyperlipidemia; IBD (inflammatory bowel disease); Kidney disease; Meningitis; Muscle disorder; OSTEOPOROSIS; Parathyroid disorder (HCC); Pituitary disease (HCC); Sickle cell disease (HCC); Substance abuse; Thyroid disease; Ulcer; or Urinary tract infection, site not specified.  MEDS:   Current Outpatient Prescriptions:   •  busPIRone (BUSPAR) 10 MG Tab, Take 1 Tab by mouth 2 times a day., Disp: 60 Tab, Rfl: 6  •  omeprazole (PRILOSEC) 40 MG delayed-release capsule, Take 1 Cap by mouth every day., Disp: 30 Cap, Rfl: 6  •  albuterol 108 (90 BASE) MCG/ACT Aero Soln inhalation aerosol, Inhale 2 Puffs by mouth every 6 hours as needed for Shortness of Breath., Disp: 8.5 g, Rfl: 11  •  lidocaine (LIDODERM) 5 % Patch, Apply 1 Patch to skin as directed every 24 hours., Disp: 30 Patch, Rfl: 1  ALLERGIES:   Allergies   Allergen Reactions   • Nkda [No Known Drug Allergy]      SURGHX:   Past Surgical History:   Procedure Laterality Date   • FINGER ORIF  6/24/2014    Performed by Ed Espitia M.D. at [facility]   • PERCUTANEOUS PINNING  6/24/2014    Performed by Ed Espitia M.D. at [facility]     SOCHX:  reports that she quit smoking about 7 years ago. Her smoking use included Cigarettes. She has a 0.10 pack-year smoking history. She has never used smokeless tobacco. She reports that she does not drink alcohol or use drugs..  FH:   Family History   Problem Relation Age of  Onset   • Cancer Maternal Aunt      breast cancer    • Hyperlipidemia Maternal Grandfather    • Diabetes Maternal Grandfather      diet control   • Hypertension Maternal Grandfather    • Heart Disease Maternal Grandfather    • Hypertension Father    • Diabetes Maternal Uncle    • Lung Disease Paternal Aunt      asthma         Physical Exam:  Vitals/ General Appearance:   Weight/BMI: Body mass index is 32.22 kg/m².  Blood pressure 110/68, pulse (!) 103, resp. rate 15, height 1.524 m (5'), weight 74.8 kg (165 lb), SpO2 94 %.  Vitals:    05/01/18 1036   BP: 110/68   Pulse: (!) 103   Resp: 15   SpO2: 94%   Weight: 74.8 kg (165 lb)   Height: 1.524 m (5')       Pt. is alert and oriented to time, place and person. Cooperative and in no apparent distress.       1. Head: Atraumatic, normocephalic.   2. Ears: Normal tympanic membrane and no discharge  3. Nose: No inferior turbinate hypertophy, no septal deviation, no polyp.   4. Throat: Oropharynx appears crowded in that the palate is overhanging (Malam Skylar scale 3)  5. Neck: Supple. No thyromegaly  6. Chest: Trachea central, no spine deformity   7. Lungs auscultation: B/L good air entry, vesicular breath sounds, no adventitious sounds  8. Heart auscultation: 1st and 2nd heart sounds normal, regular rhythm. No appreciable murmur.  9.  Extremities: no pedal edema.  10. Skin: No rash        INVESTIGATIONS:           ASSESSMENT AND PLAN     1.Obstructive Sleep Apnea (GUERA).      The pathophysiology of GUERA and the increased risk of cardiovascular morbidity from untreated GUERA is discussed in detail with the patient.   She is urged to avoid supine sleep, weight gain and alcoholic beverages since all of these can worsen GUERA. She is cautioned against drowsy driving. If She feels sleepy while driving, She must pull over for a break/nap, rather than persist on the road, in the interest of She own safety and that of others on the road.   Plan   - Auto CPAP vs overnight CPAP titration  vs OAT. After informed she would like to try OAT as a treatment for GUERA   - F/u in 6 weeks to assess the efficiacy of recommended pressure    - SS was reviewed and discussed with the pt   - compliance was reinforced     2.Insomnia l. The importance of cognitive restructuring and behavior modification therapy is emphasized for long-term improvement rather than the use of hypnotic agents, which may offer short term relief but may lead to the development of tolerance and side effects with prolonged use. Evening exercise, wind-down before bedtime, and stimulus control (leaving the bed when unable to fall back asleep at night) are explained.               Plan              - D/C Ambien and switching to lunesta               - Handouts on stimulus control and sleep hygiene are given and a couple of titles of books on insomnia are recommended, written by behavioral psychologists.               - reassess after the treatment of GUERA

## 2018-05-02 RX ORDER — METRONIDAZOLE 7.5 MG/G
1 GEL VAGINAL
Qty: 1 TUBE | Refills: 0 | Status: SHIPPED | OUTPATIENT
Start: 2018-05-02 | End: 2018-05-07

## 2018-05-02 NOTE — TELEPHONE ENCOUNTER
----- Message from Raza Bryant M.D. sent at 4/30/2018  8:34 PM PDT -----  Metrogel , then f/u pap 6 months

## 2018-05-29 ENCOUNTER — OFFICE VISIT (OUTPATIENT)
Dept: MEDICAL GROUP | Facility: MEDICAL CENTER | Age: 23
End: 2018-05-29
Payer: COMMERCIAL

## 2018-05-29 VITALS
HEART RATE: 92 BPM | TEMPERATURE: 98.9 F | BODY MASS INDEX: 31.85 KG/M2 | OXYGEN SATURATION: 94 % | HEIGHT: 59 IN | WEIGHT: 158 LBS | RESPIRATION RATE: 16 BRPM | SYSTOLIC BLOOD PRESSURE: 108 MMHG | DIASTOLIC BLOOD PRESSURE: 64 MMHG

## 2018-05-29 DIAGNOSIS — F41.9 ANXIETY: ICD-10-CM

## 2018-05-29 DIAGNOSIS — R00.2 PALPITATIONS: ICD-10-CM

## 2018-05-29 DIAGNOSIS — G47.00 INSOMNIA, UNSPECIFIED TYPE: ICD-10-CM

## 2018-05-29 DIAGNOSIS — G47.33 OSA (OBSTRUCTIVE SLEEP APNEA): ICD-10-CM

## 2018-05-29 DIAGNOSIS — Z13.220 SCREENING, LIPID: ICD-10-CM

## 2018-05-29 PROCEDURE — 99214 OFFICE O/P EST MOD 30 MIN: CPT | Performed by: INTERNAL MEDICINE

## 2018-05-29 RX ORDER — DULOXETIN HYDROCHLORIDE 30 MG/1
30 CAPSULE, DELAYED RELEASE ORAL DAILY
Qty: 30 CAP | Refills: 6 | Status: SHIPPED | OUTPATIENT
Start: 2018-05-29 | End: 2019-03-19

## 2018-05-29 NOTE — PROGRESS NOTES
CC: Follow-up anxiety, insomnia.    HPI:   Isa presents today with the following.    1. GUERA (obstructive sleep apnea)  Was diagnosed with sleep apnea is found that a nightguard would likely serve ice.  She has not started using as of yet.    2. Insomnia, unspecified type  She does have difficulty with insomnia she is getting medications now from pulmonology in the sleep center.    3. Anxiety  Anxiety is still not doing well.  She did not show any improvement with Celexa and BuSpar made her feel somewhat ill and did not improve either.  She is wanting to try other agents.  She was referred psychiatry of her never called to make an appointment.    4. Screening, lipid  ROS: Denies Chest pain, SOB, LE edema.    5.  Palpitations  She is reporting intermittent palpitations with a heart rate of 140 slightly dizzy.      Patient Active Problem List    Diagnosis Date Noted   • GUERA (obstructive sleep apnea) 05/29/2018   • Insomnia 03/30/2018   • Anxiety 01/23/2018   • Obesity (BMI 30.0-34.9) 08/01/2017   • Gastroesophageal reflux disease with esophagitis 03/21/2017   • Dysthymia 11/08/2016       Current Outpatient Prescriptions   Medication Sig Dispense Refill   • DULoxetine (CYMBALTA) 30 MG Cap DR Particles Take 1 Cap by mouth every day. 30 Cap 6   • eszopiclone (LUNESTA) 1 MG Tab tablet Take 2 Tabs by mouth at bedtime as needed for Insomnia for up to 30 days. 30 Tab 1   • albuterol 108 (90 BASE) MCG/ACT Aero Soln inhalation aerosol Inhale 2 Puffs by mouth every 6 hours as needed for Shortness of Breath. 8.5 g 11   • omeprazole (PRILOSEC) 40 MG delayed-release capsule Take 1 Cap by mouth every day. 30 Cap 6   • lidocaine (LIDODERM) 5 % Patch Apply 1 Patch to skin as directed every 24 hours. 30 Patch 1     No current facility-administered medications for this visit.          Allergies as of 05/29/2018 - Reviewed 05/01/2018   Allergen Reaction Noted   • Nkda [no known drug allergy]  07/23/2009        ROS: Denies Chest pain,  "SOB, LE edema.    /64   Pulse 92   Temp 37.2 °C (98.9 °F)   Resp 16   Ht 1.499 m (4' 11\")   Wt 71.7 kg (158 lb)   SpO2 94%   BMI 31.91 kg/m²     Physical Exam:  Gen:         Alert and oriented, No apparent distress.  Neck:        No Lymphadenopathy or Bruits.  Lungs:     Clear to auscultation bilaterally  CV:          Regular rate and rhythm. No murmurs, rubs or gallops.               Ext:          No clubbing, cyanosis, edema.      Assessment and Plan.   22 y.o. female with the following issues.    1. GUERA (obstructive sleep apnea)  Follow-up after starting treatment    2. Insomnia, unspecified type  Now followed by sleep doctor    3. Anxiety  Placing on Cymbalta caution about side effects will titrate up to 60 mg of tolerating well.  Placed another referral to psychiatry in case she is not improving.  - DULoxetine (CYMBALTA) 30 MG Cap DR Particles; Take 1 Cap by mouth every day.  Dispense: 30 Cap; Refill: 6  - REFERRAL TO PSYCHIATRY    4. Screening, lipid    - COMP METABOLIC PANEL; Future  - LIPID PROFILE; Future    5.  Palpitations   have given ER precautions written for Holter monitor.    "

## 2018-06-15 DIAGNOSIS — R30.0 DYSURIA: ICD-10-CM

## 2018-06-26 ENCOUNTER — HOSPITAL ENCOUNTER (OUTPATIENT)
Dept: LAB | Facility: MEDICAL CENTER | Age: 23
End: 2018-06-26
Attending: NURSE PRACTITIONER
Payer: COMMERCIAL

## 2018-06-26 DIAGNOSIS — R30.0 DYSURIA: ICD-10-CM

## 2018-06-26 LAB
APPEARANCE UR: CLEAR
BACTERIA #/AREA URNS HPF: ABNORMAL /HPF
BILIRUB UR QL STRIP.AUTO: NEGATIVE
COLOR UR: YELLOW
EPI CELLS #/AREA URNS HPF: ABNORMAL /HPF
GLUCOSE UR STRIP.AUTO-MCNC: NEGATIVE MG/DL
HYALINE CASTS #/AREA URNS LPF: ABNORMAL /LPF
KETONES UR STRIP.AUTO-MCNC: NEGATIVE MG/DL
LEUKOCYTE ESTERASE UR QL STRIP.AUTO: ABNORMAL
MICRO URNS: ABNORMAL
NITRITE UR QL STRIP.AUTO: NEGATIVE
PH UR STRIP.AUTO: 7.5 [PH]
PROT UR QL STRIP: NEGATIVE MG/DL
RBC # URNS HPF: ABNORMAL /HPF
RBC UR QL AUTO: NEGATIVE
SP GR UR STRIP.AUTO: 1.02
UROBILINOGEN UR STRIP.AUTO-MCNC: 0.2 MG/DL
WBC #/AREA URNS HPF: ABNORMAL /HPF

## 2018-06-26 PROCEDURE — 87086 URINE CULTURE/COLONY COUNT: CPT

## 2018-06-26 PROCEDURE — 81001 URINALYSIS AUTO W/SCOPE: CPT

## 2018-06-28 ENCOUNTER — TELEPHONE (OUTPATIENT)
Dept: SLEEP MEDICINE | Facility: MEDICAL CENTER | Age: 23
End: 2018-06-28

## 2018-06-28 DIAGNOSIS — G47.33 OSA (OBSTRUCTIVE SLEEP APNEA): ICD-10-CM

## 2018-06-28 LAB
BACTERIA UR CULT: NORMAL
SIGNIFICANT IND 70042: NORMAL
SITE SITE: NORMAL
SOURCE SOURCE: NORMAL

## 2018-06-28 NOTE — TELEPHONE ENCOUNTER
Patient currently schedule for follow up 7/31/18 with Dr. Quiroz for 3 month dental appliance follow up. Per pt will receive dental on 7/24/18.please sign for order. Patient will complete HST on dental appliance and follow up with results and treatment

## 2018-07-02 ENCOUNTER — PATIENT MESSAGE (OUTPATIENT)
Dept: OBGYN | Facility: CLINIC | Age: 23
End: 2018-07-02

## 2018-07-03 NOTE — TELEPHONE ENCOUNTER
From: Isa Lamar  To: Raza Bryant M.D.  Sent: 7/2/2018 4:49 PM PDT  Subject: Test Result Question    I’ve sent a message last week regarding my test. I have received no answer from anyone.

## 2018-07-19 ENCOUNTER — TELEPHONE (OUTPATIENT)
Dept: SLEEP MEDICINE | Facility: MEDICAL CENTER | Age: 23
End: 2018-07-19

## 2018-07-19 DIAGNOSIS — G47.00 INSOMNIA, UNSPECIFIED TYPE: ICD-10-CM

## 2018-07-19 RX ORDER — ESZOPICLONE 1 MG/1
1 TABLET, FILM COATED ORAL
Qty: 30 TAB | Refills: 2 | Status: SHIPPED
Start: 2018-07-19 | End: 2018-10-17

## 2018-07-19 NOTE — TELEPHONE ENCOUNTER
Pt called stating she was given Lunesta Rx and she did not fill it.  Was hoping she can get a new Rx. Pt has been having problem sleeping this pass week. Can't fall asleep has been going to bed around 1-2 am when she usually goes to sleep around 10-11 pm. Has been waking up multiple times during the time she sleeps. States will receive dental appliance from [facility] 7/24/18. HST schedule on 8/22/18 FV on 11/6/18 with Andre.

## 2018-08-04 ENCOUNTER — HOSPITAL ENCOUNTER (OUTPATIENT)
Dept: LAB | Facility: MEDICAL CENTER | Age: 23
End: 2018-08-04
Attending: INTERNAL MEDICINE
Payer: COMMERCIAL

## 2018-08-04 DIAGNOSIS — K92.1 HEMATOCHEZIA: ICD-10-CM

## 2018-08-04 DIAGNOSIS — Z13.220 SCREENING, LIPID: ICD-10-CM

## 2018-08-04 LAB
ALBUMIN SERPL BCP-MCNC: 4.5 G/DL (ref 3.2–4.9)
ALBUMIN/GLOB SERPL: 1.6 G/DL
ALP SERPL-CCNC: 49 U/L (ref 30–99)
ALT SERPL-CCNC: 30 U/L (ref 2–50)
ANION GAP SERPL CALC-SCNC: 3 MMOL/L (ref 0–11.9)
AST SERPL-CCNC: 24 U/L (ref 12–45)
BASOPHILS # BLD AUTO: 0.5 % (ref 0–1.8)
BASOPHILS # BLD: 0.03 K/UL (ref 0–0.12)
BDY FAT % MEASURED: 32.1 %
BILIRUB SERPL-MCNC: 0.5 MG/DL (ref 0.1–1.5)
BP DIAS: 65 MMHG
BP SYS: 116 MMHG
BUN SERPL-MCNC: 11 MG/DL (ref 8–22)
CALCIUM SERPL-MCNC: 9.2 MG/DL (ref 8.5–10.5)
CHLORIDE SERPL-SCNC: 108 MMOL/L (ref 96–112)
CHOLEST SERPL-MCNC: 131 MG/DL (ref 100–199)
CHOLEST SERPL-MCNC: 135 MG/DL (ref 100–199)
CO2 SERPL-SCNC: 26 MMOL/L (ref 20–33)
CREAT SERPL-MCNC: 0.78 MG/DL (ref 0.5–1.4)
DIABETES HTDIA: NO
EOSINOPHIL # BLD AUTO: 0.12 K/UL (ref 0–0.51)
EOSINOPHIL NFR BLD: 1.9 % (ref 0–6.9)
ERYTHROCYTE [DISTWIDTH] IN BLOOD BY AUTOMATED COUNT: 43 FL (ref 35.9–50)
EVENT NAME HTEVT: NORMAL
FASTING HTFAS: YES
GLOBULIN SER CALC-MCNC: 2.8 G/DL (ref 1.9–3.5)
GLUCOSE SERPL-MCNC: 94 MG/DL (ref 65–99)
GLUCOSE SERPL-MCNC: 96 MG/DL (ref 65–99)
HCT VFR BLD AUTO: 42.2 % (ref 37–47)
HDLC SERPL-MCNC: 50 MG/DL
HDLC SERPL-MCNC: 53 MG/DL
HGB BLD-MCNC: 14.2 G/DL (ref 12–16)
HYPERTENSION HTHYP: NO
IMM GRANULOCYTES # BLD AUTO: 0.02 K/UL (ref 0–0.11)
IMM GRANULOCYTES NFR BLD AUTO: 0.3 % (ref 0–0.9)
LDLC SERPL CALC-MCNC: 71 MG/DL
LDLC SERPL CALC-MCNC: 71 MG/DL
LYMPHOCYTES # BLD AUTO: 2.31 K/UL (ref 1–4.8)
LYMPHOCYTES NFR BLD: 36.7 % (ref 22–41)
MCH RBC QN AUTO: 30.9 PG (ref 27–33)
MCHC RBC AUTO-ENTMCNC: 33.6 G/DL (ref 33.6–35)
MCV RBC AUTO: 91.9 FL (ref 81.4–97.8)
MONOCYTES # BLD AUTO: 0.5 K/UL (ref 0–0.85)
MONOCYTES NFR BLD AUTO: 7.9 % (ref 0–13.4)
NEUTROPHILS # BLD AUTO: 3.31 K/UL (ref 2–7.15)
NEUTROPHILS NFR BLD: 52.7 % (ref 44–72)
NRBC # BLD AUTO: 0 K/UL
NRBC BLD-RTO: 0 /100 WBC
PLATELET # BLD AUTO: 243 K/UL (ref 164–446)
PMV BLD AUTO: 10.2 FL (ref 9–12.9)
POTASSIUM SERPL-SCNC: 4 MMOL/L (ref 3.6–5.5)
PROT SERPL-MCNC: 7.3 G/DL (ref 6–8.2)
RBC # BLD AUTO: 4.59 M/UL (ref 4.2–5.4)
SCREENING LOC CITY HTCIT: NORMAL
SCREENING LOC STATE HTSTA: NORMAL
SCREENING LOCATION HTLOC: NORMAL
SMOKING HTSMO: NO
SODIUM SERPL-SCNC: 137 MMOL/L (ref 135–145)
SUBSCRIBER ID HTSID: NORMAL
TRIGL SERPL-MCNC: 51 MG/DL (ref 0–149)
TRIGL SERPL-MCNC: 54 MG/DL (ref 0–149)
WBC # BLD AUTO: 6.3 K/UL (ref 4.8–10.8)

## 2018-08-04 PROCEDURE — 82947 ASSAY GLUCOSE BLOOD QUANT: CPT

## 2018-08-04 PROCEDURE — 80053 COMPREHEN METABOLIC PANEL: CPT

## 2018-08-04 PROCEDURE — 36415 COLL VENOUS BLD VENIPUNCTURE: CPT

## 2018-08-04 PROCEDURE — 85025 COMPLETE CBC W/AUTO DIFF WBC: CPT

## 2018-08-04 PROCEDURE — 80061 LIPID PANEL: CPT

## 2018-08-04 PROCEDURE — S5190 WELLNESS ASSESSMENT BY NONPH: HCPCS

## 2018-08-04 PROCEDURE — 80061 LIPID PANEL: CPT | Mod: 91

## 2018-08-07 ENCOUNTER — GYNECOLOGY VISIT (OUTPATIENT)
Dept: OBGYN | Facility: CLINIC | Age: 23
End: 2018-08-07
Payer: COMMERCIAL

## 2018-08-07 VITALS
HEIGHT: 59 IN | BODY MASS INDEX: 32.25 KG/M2 | SYSTOLIC BLOOD PRESSURE: 110 MMHG | DIASTOLIC BLOOD PRESSURE: 62 MMHG | WEIGHT: 160 LBS

## 2018-08-07 DIAGNOSIS — Z30.46 NEXPLANON REMOVAL: ICD-10-CM

## 2018-08-07 DIAGNOSIS — Z30.017 NEXPLANON INSERTION: ICD-10-CM

## 2018-08-07 LAB
INT CON NEG: NEGATIVE
INT CON POS: POSITIVE
POC URINE PREGNANCY TEST: NEGATIVE

## 2018-08-07 PROCEDURE — 11981 INSERTION DRUG DLVR IMPLANT: CPT | Performed by: OBSTETRICS & GYNECOLOGY

## 2018-08-07 PROCEDURE — 81025 URINE PREGNANCY TEST: CPT | Performed by: OBSTETRICS & GYNECOLOGY

## 2018-08-07 PROCEDURE — 11982 REMOVE DRUG IMPLANT DEVICE: CPT | Performed by: OBSTETRICS & GYNECOLOGY

## 2018-08-07 NOTE — PROGRESS NOTES
Procedure note; Removal of Nexplanon and replacement with new Nexplanon;    Urine pregnancy test negative    Informed consent obtained, consent signed    Implant palpated 8 cm medial to medial condyle just under the skin    Marker used to ladi tip of implant    Betadine prep the area overlying the implant    0.5 cc lidocaine 1% injected under the lateral tip    2 mm skin incision at the lateral tip    Nexplanon grasped with mosquito clamp    Nexplanon removed intact    Nexplanon shown to the patient    2 inch needle used to inject lidocaine into the incision site for length of 2 inches.    Insertion device used to place new subdermal implant through incision at 30° angle then placed just underneath the skin    Steri-Strip placed over skin incision    Pressure dressing applied    Patient instructed to remove pressure dressing 24 hours and Steri-Strip in 3-5 days         Alexy Cuevas MD

## 2018-08-21 ENCOUNTER — GYNECOLOGY VISIT (OUTPATIENT)
Dept: OBGYN | Facility: CLINIC | Age: 23
End: 2018-08-21
Payer: COMMERCIAL

## 2018-08-21 VITALS
DIASTOLIC BLOOD PRESSURE: 64 MMHG | WEIGHT: 158 LBS | HEIGHT: 60 IN | BODY MASS INDEX: 31.02 KG/M2 | SYSTOLIC BLOOD PRESSURE: 112 MMHG

## 2018-08-21 DIAGNOSIS — Z97.5 NEXPLANON IN PLACE: ICD-10-CM

## 2018-08-21 DIAGNOSIS — Z30.09 FAMILY PLANNING: ICD-10-CM

## 2018-08-21 PROCEDURE — 99213 OFFICE O/P EST LOW 20 MIN: CPT | Performed by: OBSTETRICS & GYNECOLOGY

## 2018-08-21 NOTE — PROGRESS NOTES
Chief complaint;    Isa Lamar is a 22 y.o.  who presents for checkup after placement of subdermal implant. The patient has no current complaints.    Review of systems; denies fever chills abdominal pain, denies chest pain shortness of breath or urinary symptoms  Past medical history-  Past Medical History:   Diagnosis Date   • Anemia    • Anxiety 2018   • Dysthymia 2016   • Infectious mononucleosis    • Nausea and vomiting in pregnancy 2013     Past surgical history-  Past Surgical History:   Procedure Laterality Date   • FINGER ORIF  2014    Performed by Ed Espitia M.D. at SUR[facility]ERY [facility]   • PERCUTANEOUS PINNING  2014    Performed by Ed Espitia M.D. at [facility]     Allergies-Nkda [no known drug allergy]  Medications-  Current Outpatient Prescriptions on File Prior to Visit   Medication Sig Dispense Refill   • eszopiclone (LUNESTA) 1 MG Tab tablet Take 1 Tab by mouth at bedtime as needed.  May repeat 1 time. for Insomnia for up to 90 days. 30 Tab 2   • DULoxetine (CYMBALTA) 30 MG Cap DR Particles Take 1 Cap by mouth every day. 30 Cap 6   • albuterol 108 (90 BASE) MCG/ACT Aero Soln inhalation aerosol Inhale 2 Puffs by mouth every 6 hours as needed for Shortness of Breath. 8.5 g 11   • omeprazole (PRILOSEC) 40 MG delayed-release capsule Take 1 Cap by mouth every day. 30 Cap 6   • lidocaine (LIDODERM) 5 % Patch Apply 1 Patch to skin as directed every 24 hours. 30 Patch 1     No current facility-administered medications on file prior to visit.      Social history-  Social History     Social History   • Marital status: Single     Spouse name: N/A   • Number of children: N/A   • Years of education: N/A     Occupational History   • Not on file.     Social History Main Topics   • Smoking status: Former Smoker     Packs/day: 0.05     Years: 2.00     Types: Cigarettes     Quit date: 2011   • Smokeless tobacco: Never Used   •  Alcohol use No   • Drug use: No   • Sexual activity: Yes     Partners: Male     Birth control/ protection: Condom      Comment: condom     Other Topics Concern   • Not on file     Social History Narrative   • No narrative on file     Past Family History-no history of breast or ovarian cancer    Physical examination;  Alert and oriented x3  General a thin well-developed well-nourished female in no apparent distress  Vitals:    08/21/18 1400   BP: 112/64   Weight: 71.7 kg (158 lb)   Height: 1.524 m (5')     Skin is warm and dry  Left arm- Nexplanon present just beneath skin in good position no bruising    Impression;  Normal Nexplanon check  Family planning    Plan;  Follow-up as needed  Questions answered regarding birth control.      15  Minutes spent with the patient in face-to-face contact, greater than 50% of the time spent on counseling and coordination of care. All questions answered in detail.

## 2018-09-17 DIAGNOSIS — Z23 NEED FOR INFLUENZA VACCINATION: ICD-10-CM

## 2018-09-19 PROCEDURE — 90686 IIV4 VACC NO PRSV 0.5 ML IM: CPT | Performed by: NURSE PRACTITIONER

## 2018-10-29 ENCOUNTER — TELEPHONE (OUTPATIENT)
Dept: OBGYN | Facility: MEDICAL CENTER | Age: 23
End: 2018-10-29

## 2018-10-29 ENCOUNTER — OFFICE VISIT (OUTPATIENT)
Dept: URGENT CARE | Facility: PHYSICIAN GROUP | Age: 23
End: 2018-10-29
Payer: COMMERCIAL

## 2018-10-29 VITALS
OXYGEN SATURATION: 100 % | HEIGHT: 60 IN | DIASTOLIC BLOOD PRESSURE: 84 MMHG | WEIGHT: 155 LBS | SYSTOLIC BLOOD PRESSURE: 120 MMHG | BODY MASS INDEX: 30.43 KG/M2 | TEMPERATURE: 97 F | RESPIRATION RATE: 16 BRPM | HEART RATE: 121 BPM

## 2018-10-29 DIAGNOSIS — R10.32 LEFT LOWER QUADRANT PAIN: ICD-10-CM

## 2018-10-29 PROCEDURE — 99213 OFFICE O/P EST LOW 20 MIN: CPT | Performed by: FAMILY MEDICINE

## 2018-10-29 RX ORDER — IBUPROFEN 200 MG
200 TABLET ORAL EVERY 6 HOURS PRN
COMMUNITY
End: 2020-01-30

## 2018-10-29 NOTE — TELEPHONE ENCOUNTER
----- Message from Isa Lamar sent at 10/28/2018 11:55 AM PDT -----  Regarding: Non-Urgent Medical Question  Contact: [phone]  Mary Tracey I have been experiencing a left lower abdominal pain that shoots to the side and the back it’s very excruciating pain it started on Thursday and went from  all day and it stopped and since then it was kind of just spasms here and there .  Saturday night  continued again 10 times worse I’m nauseous and I’m unable to go to the restroom pass any kind a bowel movement it feels like someone has my ovary and is squeezing and pulling. The pain is unbearable, I understand my Pap smear came abnormal and I was to repeat a new one I’m not sure if this maybe has something to do with it?

## 2018-10-29 NOTE — TELEPHONE ENCOUNTER
Phone dickson to pt to discuss her pain sx.Pt states she has never had pain like this and states it is now making her nauseated. Pt denies fevers,dysuria, abnormal bleeding( amenorrheic with nexplanon) Pt denies hx of ovarian cysts and understands that this could be one reason for this type of pain,this can be normal with cycling. Pt denies constipation, just pain with BM or any kind of strain.Pt is advised and agrees to be seen at urgent care or ER. She verb good understanding and will go now for eval as she states pain is now worsening.

## 2018-10-30 ENCOUNTER — HOSPITAL ENCOUNTER (OUTPATIENT)
Dept: RADIOLOGY | Facility: MEDICAL CENTER | Age: 23
End: 2018-10-30
Attending: FAMILY MEDICINE
Payer: COMMERCIAL

## 2018-10-30 DIAGNOSIS — R10.32 LEFT LOWER QUADRANT PAIN: ICD-10-CM

## 2018-10-30 PROCEDURE — 76830 TRANSVAGINAL US NON-OB: CPT

## 2018-10-30 PROCEDURE — 74018 RADEX ABDOMEN 1 VIEW: CPT

## 2018-11-01 ASSESSMENT — ENCOUNTER SYMPTOMS
CONSTIPATION: 0
DIARRHEA: 0
FEVER: 0
NAUSEA: 0

## 2018-11-01 NOTE — PROGRESS NOTES
Subjective:   Isa Lamar is a 22 y.o. female who presents for LLQ Pain (onset thursday)     LLQ Pain   This is a new problem. The current episode started in the past 7 days. The onset quality is gradual. The problem occurs constantly. The problem has been waxing and waning. The pain is located in the LLQ. The pain is moderate. The quality of the pain is aching and sharp. The abdominal pain does not radiate. Pertinent negatives include no constipation, diarrhea, dysuria, fever or nausea.     Review of Systems   Constitutional: Negative for fever.   Gastrointestinal: Negative for constipation, diarrhea and nausea.   Genitourinary: Negative for dysuria.      Objective:   /84   Pulse (!) 121   Temp 36.1 °C (97 °F) (Temporal)   Resp 16   Ht 1.524 m (5')   Wt 70.3 kg (155 lb)   SpO2 100%   BMI 30.27 kg/m²   Physical Exam   Constitutional: She is oriented to person, place, and time. She appears well-developed and well-nourished. No distress.   HENT:   Head: Normocephalic and atraumatic.   Eyes: Pupils are equal, round, and reactive to light. Conjunctivae and EOM are normal.   Cardiovascular: Normal rate and regular rhythm.    No murmur heard.  Pulmonary/Chest: Effort normal and breath sounds normal. No respiratory distress.   Abdominal: Soft. She exhibits no distension. There is tenderness. There is no rebound and no guarding.   Neurological: She is alert and oriented to person, place, and time. She has normal reflexes. No sensory deficit.   Skin: Skin is warm and dry.   Psychiatric: She has a normal mood and affect.        Assessment/Plan:   1. Left lower quadrant pain  - US-PELVIC TRANSVAGINAL ONLY; Future  - [id number] VIEW; Future    Other orders  - ibuprofen (MOTRIN) 200 MG Tab; Take 200 mg by mouth every 6 hours as needed.  OTC colace per 's directions.   Differential diagnosis, natural history, supportive care, and indications for immediate follow-up discussed.

## 2019-01-31 ENCOUNTER — TELEPHONE (OUTPATIENT)
Dept: SLEEP MEDICINE | Facility: MEDICAL CENTER | Age: 24
End: 2019-01-31

## 2019-01-31 ENCOUNTER — APPOINTMENT (OUTPATIENT)
Dept: SLEEP MEDICINE | Facility: MEDICAL CENTER | Age: 24
End: 2019-01-31
Payer: COMMERCIAL

## 2019-01-31 NOTE — TELEPHONE ENCOUNTER
Pt called and stated she has not been using her dental device. Due to dental problems she is schedule to see tommy on 2/5/19 to adjust dental device. Also schedule with her dentist for dental work. Will call to schedule apt once dental device is adjusted properly and will complete HST.

## 2019-03-19 ENCOUNTER — OFFICE VISIT (OUTPATIENT)
Dept: MEDICAL GROUP | Facility: MEDICAL CENTER | Age: 24
End: 2019-03-19
Payer: COMMERCIAL

## 2019-03-19 VITALS
HEIGHT: 60 IN | SYSTOLIC BLOOD PRESSURE: 114 MMHG | BODY MASS INDEX: 32 KG/M2 | TEMPERATURE: 97 F | HEART RATE: 95 BPM | WEIGHT: 163 LBS | OXYGEN SATURATION: 97 % | DIASTOLIC BLOOD PRESSURE: 72 MMHG

## 2019-03-19 DIAGNOSIS — R00.2 PALPITATIONS: ICD-10-CM

## 2019-03-19 DIAGNOSIS — F41.9 ANXIETY: ICD-10-CM

## 2019-03-19 PROCEDURE — 99214 OFFICE O/P EST MOD 30 MIN: CPT | Performed by: INTERNAL MEDICINE

## 2019-03-19 RX ORDER — DULOXETIN HYDROCHLORIDE 60 MG/1
60 CAPSULE, DELAYED RELEASE ORAL DAILY
Qty: 90 CAP | Refills: 3 | Status: SHIPPED | OUTPATIENT
Start: 2019-03-19 | End: 2019-08-09

## 2019-03-19 NOTE — PROGRESS NOTES
CC: Palpitations, anxiety.    HPI:   Isa presents today with the following.    1. Palpitations  Presents with persistent tachycardia.  Initially seen in office today tachycardic heart rate did come down after rest.  She denies any true chest pains no shortness of breath or dyspnea on exertion.  She is getting regular physical activity.    2. Anxiety  She reports her anxiety and depression are slightly worsened.  She does have an appointment set up with psychiatry in the coming months.  She is maintained on Cymbalta would like to increase the dose.      Patient Active Problem List    Diagnosis Date Noted   • GUERA (obstructive sleep apnea) 05/29/2018   • Insomnia 03/30/2018   • Anxiety 01/23/2018   • Obesity (BMI 30.0-34.9) 08/01/2017   • Gastroesophageal reflux disease with esophagitis 03/21/2017   • Dysthymia 11/08/2016       Current Outpatient Prescriptions   Medication Sig Dispense Refill   • DULoxetine (CYMBALTA) 60 MG Cap DR Particles delayed-release capsule Take 1 Cap by mouth every day. 90 Cap 3   • ibuprofen (MOTRIN) 200 MG Tab Take 200 mg by mouth every 6 hours as needed.     • albuterol 108 (90 BASE) MCG/ACT Aero Soln inhalation aerosol Inhale 2 Puffs by mouth every 6 hours as needed for Shortness of Breath. 8.5 g 11   • omeprazole (PRILOSEC) 40 MG delayed-release capsule Take 1 Cap by mouth every day. 30 Cap 6   • lidocaine (LIDODERM) 5 % Patch Apply 1 Patch to skin as directed every 24 hours. (Patient not taking: Reported on 10/29/2018) 30 Patch 1     No current facility-administered medications for this visit.          Allergies as of 03/19/2019 - Reviewed 03/19/2019   Allergen Reaction Noted   • Nkda [no known drug allergy]  07/23/2009        ROS: Denies Chest pain, SOB, LE edema.    /72 (BP Location: Left arm, Patient Position: Sitting)   Pulse 95   Temp 36.1 °C (97 °F)   Ht 1.524 m (5')   Wt 73.9 kg (163 lb)   SpO2 97%   BMI 31.83 kg/m²     Physical Exam:  Gen:         Alert and oriented,  No apparent distress.  Neck:        No Lymphadenopathy or Bruits.  Lungs:     Clear to auscultation bilaterally  CV:          Regular rate and rhythm. No murmurs, rubs or gallops.               Ext:          No clubbing, cyanosis, edema.    EKG:  Normal sinus rythm, no acute st-t wave changes.    Assessment and Plan.   23 y.o. female with the following issues.    1. Palpitations  Sending for blood work if symptoms should change we will get a Holter monitor.  - TSH; Future  - TRIIDOTHYRONINE; Future  - FREE THYROXINE; Future  - EKG - Clinic Performed  - Comp Metabolic Panel; Future  - CBC WITH DIFFERENTIAL; Future    2. Anxiety  Increasing Cymbalta to 60 mg prescription sent to the pharmacy.

## 2019-03-23 ENCOUNTER — HOSPITAL ENCOUNTER (OUTPATIENT)
Dept: LAB | Facility: MEDICAL CENTER | Age: 24
End: 2019-03-23
Attending: INTERNAL MEDICINE
Payer: COMMERCIAL

## 2019-03-23 DIAGNOSIS — R00.2 PALPITATIONS: ICD-10-CM

## 2019-03-23 LAB
ALBUMIN SERPL BCP-MCNC: 4.3 G/DL (ref 3.2–4.9)
ALBUMIN/GLOB SERPL: 1.5 G/DL
ALP SERPL-CCNC: 50 U/L (ref 30–99)
ALT SERPL-CCNC: 22 U/L (ref 2–50)
ANION GAP SERPL CALC-SCNC: 4 MMOL/L (ref 0–11.9)
AST SERPL-CCNC: 19 U/L (ref 12–45)
BASOPHILS # BLD AUTO: 0.3 % (ref 0–1.8)
BASOPHILS # BLD: 0.02 K/UL (ref 0–0.12)
BILIRUB SERPL-MCNC: 0.5 MG/DL (ref 0.1–1.5)
BUN SERPL-MCNC: 8 MG/DL (ref 8–22)
CALCIUM SERPL-MCNC: 9.2 MG/DL (ref 8.5–10.5)
CHLORIDE SERPL-SCNC: 107 MMOL/L (ref 96–112)
CO2 SERPL-SCNC: 26 MMOL/L (ref 20–33)
CREAT SERPL-MCNC: 0.73 MG/DL (ref 0.5–1.4)
EOSINOPHIL # BLD AUTO: 0.12 K/UL (ref 0–0.51)
EOSINOPHIL NFR BLD: 2.1 % (ref 0–6.9)
ERYTHROCYTE [DISTWIDTH] IN BLOOD BY AUTOMATED COUNT: 42.4 FL (ref 35.9–50)
FASTING STATUS PATIENT QL REPORTED: NORMAL
GLOBULIN SER CALC-MCNC: 2.9 G/DL (ref 1.9–3.5)
GLUCOSE SERPL-MCNC: 94 MG/DL (ref 65–99)
HCT VFR BLD AUTO: 42.2 % (ref 37–47)
HGB BLD-MCNC: 14 G/DL (ref 12–16)
IMM GRANULOCYTES # BLD AUTO: 0.01 K/UL (ref 0–0.11)
IMM GRANULOCYTES NFR BLD AUTO: 0.2 % (ref 0–0.9)
LYMPHOCYTES # BLD AUTO: 2.02 K/UL (ref 1–4.8)
LYMPHOCYTES NFR BLD: 35.3 % (ref 22–41)
MCH RBC QN AUTO: 31.2 PG (ref 27–33)
MCHC RBC AUTO-ENTMCNC: 33.2 G/DL (ref 33.6–35)
MCV RBC AUTO: 94 FL (ref 81.4–97.8)
MONOCYTES # BLD AUTO: 0.44 K/UL (ref 0–0.85)
MONOCYTES NFR BLD AUTO: 7.7 % (ref 0–13.4)
NEUTROPHILS # BLD AUTO: 3.11 K/UL (ref 2–7.15)
NEUTROPHILS NFR BLD: 54.4 % (ref 44–72)
NRBC # BLD AUTO: 0 K/UL
NRBC BLD-RTO: 0 /100 WBC
PLATELET # BLD AUTO: 255 K/UL (ref 164–446)
PMV BLD AUTO: 10 FL (ref 9–12.9)
POTASSIUM SERPL-SCNC: 3.8 MMOL/L (ref 3.6–5.5)
PROT SERPL-MCNC: 7.2 G/DL (ref 6–8.2)
RBC # BLD AUTO: 4.49 M/UL (ref 4.2–5.4)
SODIUM SERPL-SCNC: 137 MMOL/L (ref 135–145)
T3 SERPL-MCNC: 93 NG/DL (ref 60–181)
T4 FREE SERPL-MCNC: 0.81 NG/DL (ref 0.53–1.43)
TSH SERPL DL<=0.005 MIU/L-ACNC: 1.4 UIU/ML (ref 0.38–5.33)
WBC # BLD AUTO: 5.7 K/UL (ref 4.8–10.8)

## 2019-03-23 PROCEDURE — 80053 COMPREHEN METABOLIC PANEL: CPT

## 2019-03-23 PROCEDURE — 85025 COMPLETE CBC W/AUTO DIFF WBC: CPT

## 2019-03-23 PROCEDURE — 36415 COLL VENOUS BLD VENIPUNCTURE: CPT

## 2019-03-23 PROCEDURE — 84439 ASSAY OF FREE THYROXINE: CPT

## 2019-03-23 PROCEDURE — 84443 ASSAY THYROID STIM HORMONE: CPT

## 2019-03-23 PROCEDURE — 84480 ASSAY TRIIODOTHYRONINE (T3): CPT

## 2019-03-24 DIAGNOSIS — F41.9 ANXIETY: ICD-10-CM

## 2019-03-24 RX ORDER — DULOXETIN HYDROCHLORIDE 60 MG/1
60 CAPSULE, DELAYED RELEASE ORAL DAILY
Qty: 30 CAP | Refills: 0 | Status: SHIPPED | OUTPATIENT
Start: 2019-03-24 | End: 2019-08-09

## 2019-05-07 ENCOUNTER — OFFICE VISIT (OUTPATIENT)
Dept: BEHAVIORAL HEALTH | Facility: CLINIC | Age: 24
End: 2019-05-07
Payer: COMMERCIAL

## 2019-05-07 VITALS
WEIGHT: 166 LBS | HEART RATE: 84 BPM | HEIGHT: 65 IN | DIASTOLIC BLOOD PRESSURE: 64 MMHG | BODY MASS INDEX: 27.66 KG/M2 | SYSTOLIC BLOOD PRESSURE: 108 MMHG

## 2019-05-07 DIAGNOSIS — F33.1 MODERATE EPISODE OF RECURRENT MAJOR DEPRESSIVE DISORDER (HCC): ICD-10-CM

## 2019-05-07 DIAGNOSIS — F41.1 GAD (GENERALIZED ANXIETY DISORDER): ICD-10-CM

## 2019-05-07 PROCEDURE — 99205 OFFICE O/P NEW HI 60 MIN: CPT | Performed by: PSYCHIATRY & NEUROLOGY

## 2019-05-07 RX ORDER — DULOXETIN HYDROCHLORIDE 20 MG/1
20 CAPSULE, DELAYED RELEASE ORAL DAILY
Qty: 7 CAP | Refills: 0 | Status: SHIPPED | OUTPATIENT
Start: 2019-05-07 | End: 2019-08-09

## 2019-05-07 RX ORDER — ESZOPICLONE 1 MG/1
1 TABLET, FILM COATED ORAL
COMMUNITY
End: 2020-01-30

## 2019-05-07 ASSESSMENT — PATIENT HEALTH QUESTIONNAIRE - PHQ9
CLINICAL INTERPRETATION OF PHQ2 SCORE: 3
5. POOR APPETITE OR OVEREATING: 2 - MORE THAN HALF THE DAYS
SUM OF ALL RESPONSES TO PHQ QUESTIONS 1-9: 15

## 2019-05-07 NOTE — BH THERAPY
BEHAVIORAL HEALTH  INITIAL PSYCHIATRIC EVALUATION    Name: Isa Lamar  MRN: [id number]  : 1995  Age: 23 y.o.  Date of assessment: 2019  PCP: Dave Sanchez M.D.  Persons in attendance:Patient  Total face-to-face time: 60 minutes    CHIEF COMPLAINT AND HISTORY OF PRESENTING PROBLEM:   (As stated by Patient)  Isa Lamar is a 23 y.o.,  or  female referred for assessment by No ref. provider found. Primary presenting issue includes   Chief Complaint   Patient presents with   • Initial  Evaluation     I have been taking cymbalta 60 mg for one month and it is not helpin me.     .    HISTORY OF PRESENT ILLNESS:      This is a 24 yo female, single, mother of five yo daughter, employed, lives with a roommate, seen today for evaluation.  The patient reported that she has been depressed and anxious for few years. The patient tried celexa in 2016 and she took it for one year and it stopped working. Then she tried buspar but that did not help. The patient was started on cymbalta year  and recently it was increased to cymbalta 60 mg but her depression got worse. The patient reported she has been feeling depressed, sad all the time, no energy, increased appetite, feeling bad about herself, and psychomotor retardation. The patient also reported that she grew with increased anxiety and worries all the time. She had difficulty to get in to sleep and stay asleep. The patient is getting irritable very easily. The patient denied suicidal ideation or attempts. The patient denied symptoms of virgie, hypomania, hallucinations, delusions, and paranoia. The patient denied OCD and denied bulimia and anorexia. The patient denied PTSD and she reported postpartum blue. The patient reported that she likes food and she has been gaining weight and there is family history of weight gain. The patient denied psychiatric inpatient treatment.     FAMILY/SOCIAL HISTORY:  Does patient/parent report  a family history of behavioral health issues, diagnoses, or treatment? No   The patient denied family history of suicide and mental disorder. The patient is oldest of four siblings and her youngest one is from different dad. The patient finished highschool and got pregnant. The patient broke up with her boyfriend three years ago. 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      Family History   Problem Relation Age of Onset   • Cancer Maternal Aunt         breast cancer    • Hyperlipidemia Maternal Grandfather    • Diabetes Maternal Grandfather         diet control   • Hypertension Maternal Grandfather    • Heart Disease Maternal Grandfather    • Hypertension Father    • Diabetes Maternal Uncle    • Lung Disease Paternal Aunt         asthma       Current living situation/household members: lives with a roommate and her daughter.  Relevant family history/structure/dynamics: employed.  Quality/quantity of current family and/or social support: good.    Social History     Social History   • Marital status: Single     Spouse name: N/A   • Number of children: N/A   • Years of education: N/A     Occupational History   • Not on file.     Social History Main Topics   • Smoking status: Former Smoker     Packs/day: 0.05     Years: 2.00     Types: Cigarettes     Quit date: 2/20/2011   • Smokeless tobacco: Never Used   • Alcohol use No   • Drug use: No   • Sexual activity: Yes     Partners: Male     Birth control/ protection: Condom      Comment: condom     Other Topics Concern   • Not on file     Social History Narrative   • No narrative on file       PSYCHIATRIC TREATMENT HISTORY:  Does patient/parent report a history of outpatient psychiatric treatment for patient?   No:     Does patient/parent report a history of psychiatric hospitalizations for patient?  No:    Does patient/parent report a history of psychotherapy or other behavioral health treatment for patient?   No:    PAST MEDICAL HISTORY:          Past Medical History:   Diagnosis Date   • Anemia    • Anxiety 2018   • Depression    • Dysthymia 2016   • Infectious mononucleosis    • Nausea and vomiting in pregnancy 2013       Medication Allergies  Patient has no known allergies.    Medications (non psychiatric)  Current Outpatient Prescriptions   Medication Sig Dispense Refill   • eszopiclone (LUNESTA) 1 MG Tab tablet Take 1 mg by mouth at bedtime as needed for Insomnia.     • etonogestrel (NEXPLANON) 68 MG Implant implant Inject 1 Each as instructed Once.     • DULoxetine (CYMBALTA) 20 MG Cap DR Particles Take 1 Cap by mouth every day. 7 Cap 0   • sertraline (ZOLOFT) 50 MG Tab Take 1/2 tab for one week then one a day 30 Tab 2   • DULoxetine (CYMBALTA) 60 MG Cap DR Particles delayed-release capsule Take 1 Cap by mouth every day. 30 Cap 0   • DULoxetine (CYMBALTA) 60 MG Cap DR Particles delayed-release capsule Take 1 Cap by mouth every day. 90 Cap 3   • ibuprofen (MOTRIN) 200 MG Tab Take 200 mg by mouth every 6 hours as needed.     • omeprazole (PRILOSEC) 40 MG delayed-release capsule Take 1 Cap by mouth every day. 30 Cap 6   • albuterol 108 (90 BASE) MCG/ACT Aero Soln inhalation aerosol Inhale 2 Puffs by mouth every 6 hours as needed for Shortness of Breath. 8.5 g 11   • lidocaine (LIDODERM) 5 % Patch Apply 1 Patch to skin as directed every 24 hours. (Patient not taking: Reported on 10/29/2018) 30 Patch 1     No current facility-administered medications for this visit.        DEVELOPMENTAL HISTORY:    The patient reported that her dad went back to [place] when she was very young. The patient was raised by mom and step dad. The patient denied any abuse. The patient reported that school was tough and she was teased a lot. The patient was over weight. 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    Delivery:Full term, normal vaginal delivery  Birth weight: not asked.  Prenatal complications:  No   complications:   No   complications:  No  In utero substance exposure:  No    Reached developmental milestones within normal limits?   Gross motor:  Yes  Fine motor:  Yes   Speech:  Yes   Social interaction:  Yes    EDUCATIONAL:  Is patient currently enrolled in a school/educational program?   No:   Highest grade level completed:  MA certificate.    Psychiatric Review of Systems:   Mood: Sad mood  Anxiety: Frequent worry, Social anxiety and Situational anxiety  Sleep: Initial insomnia, Middle insomnia, Terminal insomnia and Restless sleep (tossing and turning)  Psychomotor/Energy: Psychomotor retardation  Eating/Appetite: Increased appetite and Weight gain  Cognitive: Difficulty maintaining focus - acute or situational  Behavior: Low/Decreased goal-directed activity   Psychosis: Other: denied.  Social: Fear of rejection       Other symptoms: denied.    LEGAL HISTORY:  Has the patient ever been involved with juvenile, adult, or family legal systems? No    Does patient report ever committing a crime? No   Does patient report every being arrested? No  Does patient report ever being a victim of a crime? No  Does patient report involvement in any current legal issues?No  Does patient report any current agency (parole/probation/CPS/) involvement? No    ABUSE/NEGLECT SCREENING:  Does patient report feeling “unsafe” in his/her home, or afraid of anyone? No  Does patient report any history of physical, sexual, or emotional abuse? No  Does parent or significant other report any of the above? No  Is there evidence of neglect by self?No  Is there evidence of neglect by a caregiver? No  Does the patient/parent report any history of CPS/APS/police involvement related to suspected abuse/neglect or domestic violence? No    Based on the information provided during the current assessment, is a mandated report of suspected abuse/neglect being made?   No    SAFETY ASSESSMENT - SELF:  Does patient acknowledge current or past  "symptoms of dangerousness to self? No    Does parent/significant other report patient has current or past symptoms of dangerousness to self? No      History of suicide by family member: No  History of suicide by friend/significant other: No    Recent change in amount/specificity/intensity of suicidal thoughts or self-harm behavior?No  Current access to firearms, medications, or other identified means of suicide/self-harm? No  If yes, willing to restrict access to means of suicide/self-harm? No  Protective factors present: Fear of suicide    Current Suicide Risk: Low  Safety Plan completed, documented in chart, and copy given to patient: No     Crisis Safety Plan completed and copy given to patient: No     SAFETY ASSESSMENT - OTHERS:  Does patient acknowledge current or past symptoms of aggressive behavior or risk to others? No  Does parent/significant other report patient has current or past symptoms of aggressive behavior or risk to others? No      Recent change in amount/specificity/intensity of thoughts or threats to harm others? No  Current access to firearms/other identified means of harm? No  If yes, willing to restrict access to weapons/means of harm? No    Current Homicide Risk: Low  Crisis safety Plan completed, documented in chart, and copy given to patient? No    Based on information provided during the current assessment, is a mandated \"duty to warn\" being exercised? No    SUBSTANCE USE/ADDICTION HISTORY:  [] Not applicable - patient 10 years of age or younger    Is there a family history of substance use/addiction? No  Does patient acknowledge or parent/significant other report use of/dependence on substances? No  Last time patient used alcohol: denied.  Within the past week? No  Last time patient used marijuana: denied.  Within the past month? No  Any other street drugs ever tried even once? No  Any use of prescription medications/pills without a prescription, or for reasons others than originally " "prescribed?  No  Any other addictive behavior reported (gambling, shopping, sex)? No    Drug History:  Amphetamine:Denied.      Cannibis:Denied.      Cocaine:Denied.      Ecstasy:Denied.      Hallucinogen:Denied.      Inhalant: Denied.      Opiate:denied.      Other:      Sedative: denied.        What consequences does the patient associate with any of the above substance use and or addictive behaviors?   None    Patient’s motivation/readiness for change: yes.    [] Patient denies use of any substance/addictive behaviors    STRENGTHS/ASSETS:  Strengths Identified by interviewer: Insight into problems, Evidence of good judgement, Social support, Optimism, Effeectively addressed past stressors/challenges and Problem-solving skills  Strengths Identified by patient: willing to take medications.    MENTAL STATUS EXAM/OBSERVATIONS  /64 (BP Location: Right arm, Patient Position: Sitting, BP Cuff Size: Adult)   Pulse 84   Ht 1.651 m (5' 5\")   Wt 75.3 kg (166 lb)   Breastfeeding? No   BMI 27.62 kg/m²   Participation: Active verbal participation, Attentive and Engaged  Grooming:  Neat  Orientation: Alert and Fully Oriented   Eye contact:  Good  Behavior: Calm   Mood:  Depressed and Anxious  Affect: Sad and Anxious  Thought process: Logical and Goal-directed  Thought content: Within normal limits  Speech: Rate within normal limits and Volume within normal limits  Perception: Within normal limits  Memory:  No gross evidence of memory deficits  Insight:  Good  Judgment: Good  Other:   Family/couple interaction observations: NA.    RESULTS OF SCREENING MEASURES:  [] Not applicable  Measure:   Score:     Measure:   Score:        CLINICAL FORMULATION:   This is a 24 yo female, single, employed, lives with a roommate and 6 yo daughter, seen today for initial evaluation. The patient has a history of depression and anxiety going back few years. The patient tried celexa for one year then buspar for some time. The patient was " started on cymbalta year ago and it was increased to cymbalta 60 mg one day month ago but di not help      DIAGNOSTIC IMPRESSION(S):    1. Moderate episode of recurrent major depressive disorder (HCC)    2. PATIENCE (generalized anxiety disorder)         IDENTIFIED NEEDS/PLAN:  [If any of these marked, trigger DISPOSITION list]    Mood/anxiety and Other:     1. major depression, recurrent, moderate.   2. PATIENCE.  Refer to [facility]: Outpatient Medication Management and     1. decrease cymbalta 20 mg for one week then stop.  2. start sertraline 50 mg half tab for one week then one a day .  we dsicussed the risk, benefit, and alternative. and 1   follow up in two weeks..    Does patient express agreement with the above plan? Yes    Referral appointment(s) scheduled? Yes    [x] More than 50% of face-to-face time was spent in counseling and coordinating care  Discussed:   1. Cymbalta 20 mg one a day # 7. Then stop.  2. Start sertraline 50 mg half tab for one week then one a day # 30 refills two.  3. The patient denied therapy at this tme  4. Follow up in two weeks.  5. Psycho education and cognitive clarifications.  6. Helped to improve self esteem.  6. Encouraged her to diet and exercise.  7. Helped her with emotional and behavioral regulations.    Arsen Cerna M.D.

## 2019-05-10 ENCOUNTER — HOSPITAL ENCOUNTER (OUTPATIENT)
Dept: LAB | Facility: MEDICAL CENTER | Age: 24
End: 2019-05-10
Attending: OBSTETRICS & GYNECOLOGY
Payer: COMMERCIAL

## 2019-05-10 LAB
HIV 1+2 AB+HIV1 P24 AG SERPL QL IA: NON REACTIVE
TREPONEMA PALLIDUM IGG+IGM AB [PRESENCE] IN SERUM OR PLASMA BY IMMUNOASSAY: NON REACTIVE

## 2019-05-10 PROCEDURE — 87389 HIV-1 AG W/HIV-1&-2 AB AG IA: CPT

## 2019-05-10 PROCEDURE — 86780 TREPONEMA PALLIDUM: CPT

## 2019-05-10 PROCEDURE — 36415 COLL VENOUS BLD VENIPUNCTURE: CPT

## 2019-06-21 ENCOUNTER — OFFICE VISIT (OUTPATIENT)
Dept: BEHAVIORAL HEALTH | Facility: CLINIC | Age: 24
End: 2019-06-21
Payer: COMMERCIAL

## 2019-06-21 VITALS
DIASTOLIC BLOOD PRESSURE: 62 MMHG | WEIGHT: 166 LBS | HEART RATE: 82 BPM | HEIGHT: 65 IN | SYSTOLIC BLOOD PRESSURE: 110 MMHG | RESPIRATION RATE: 12 BRPM | BODY MASS INDEX: 27.66 KG/M2

## 2019-06-21 DIAGNOSIS — F41.1 GAD (GENERALIZED ANXIETY DISORDER): ICD-10-CM

## 2019-06-21 DIAGNOSIS — F33.0 MILD EPISODE OF RECURRENT MAJOR DEPRESSIVE DISORDER (HCC): ICD-10-CM

## 2019-06-21 PROCEDURE — 99213 OFFICE O/P EST LOW 20 MIN: CPT | Performed by: PSYCHIATRY & NEUROLOGY

## 2019-06-21 PROCEDURE — 90833 PSYTX W PT W E/M 30 MIN: CPT | Performed by: PSYCHIATRY & NEUROLOGY

## 2019-06-21 ASSESSMENT — PATIENT HEALTH QUESTIONNAIRE - PHQ9
CLINICAL INTERPRETATION OF PHQ2 SCORE: 4
5. POOR APPETITE OR OVEREATING: 1 - SEVERAL DAYS
SUM OF ALL RESPONSES TO PHQ QUESTIONS 1-9: 10

## 2019-06-21 NOTE — BH THERAPY
"[facility]  PSYCHIATRIC FOLLOW-UP NOTE    Name: Isa Lamar  MRN: [id number]  : 1995  Age: 23 y.o.  Date of assessment: 2019  PCP: Dave Sanchez M.D.  Persons in attendance: Patient  Total face-to-face time: 30 minutes    REASON FOR VISIT/CHIEF COMPLAINT (as stated by Patient):  Isa Lamar is a 23 y.o.,  or  female, attending follow-up appointment for   Chief Complaint   Patient presents with   • Medication Management     The patient is seen today for follow up on her depression and anxiety.   .      HISTORY OF PRESENT ILLNESS:    This is a 24 yo female, single, employed, 6 yo daughter, seen today for follow up after six weeks. The patient stopped cymbalta and she has bene taking sertraline 50 mg one at night. The patient is sleeping well at night. The patient reported that she is not emotional like before and she is able to concentrate at work. The patient is more confident and less sad. The patient tried and failed cleexa and buspar before.    PSYCHOSOCIAL CHANGES SINCE PREVIOUS CONTACT:  The patient is more confident and less depressed.     RESPONSE TO TREATMENT:  She is taking sertraline 50 mg at night.    MEDICATION SIDE EFFECTS:  Denied.    REVIEW OF SYSTEMS:        Constitutional negative   Eyes negative   Ears/Nose/Mouth/Throat negative   Cardiovascular negative   Respiratory negative   Gastrointestinal negative   Genitourinary negative   Muscular negative   Integumentary negative   Neurological negative   Endocrine negative   Hematologic/Lymphatic positive - anemia.       PSYCHIATRIC EXAMINATION/MENTAL STATUS  /62   Pulse 82   Resp 12   Ht 1.651 m (5' 5\")   Wt 75.3 kg (166 lb)   BMI 27.62 kg/m²   Participation: Active verbal participation, Attentive and Engaged  Grooming:Neat  Orientation: Alert and Fully Oriented  Eye contact: Good  Behavior:Calm   Mood: Anxious  Affect: Flexible and Full range  Thought process: Logical and " Goal-directed  Thought content:  Within normal limits  Speech: Rate within normal limits and Volume within normal limits  Perception:  Within normal limits  Memory:  No gross evidence of memory deficits  Insight: Good  Judgment: Good  Family/couple interaction observations:   Other:    Current risk:    Suicide: Low   Homicide: Low   Self-harm: Low  Relapse: Low  Other:   Crisis Safety Plan reviewed?Yes  If evidence of imminent risk is present, intervention/plan:    Medical Records/Labs/Diagnostic Tests Reviewed: yes.    Medical Records/Labs/Diagnostic Tests Ordered: none.    DIAGNOSTIC IMPRESSION(S):  1. Mild episode of recurrent major depressive disorder (HCC)    2. PATIENCE (generalized anxiety disorder)           ASSESSMENT AND PLAN:    1. Major depression, recurrent, mild.  2. PATIENCE.  Continue sertraline 50 mg one a day.    3. Follow up in six weeks.    17 minutes were spent in psychotherapy.  (If greater than 16 minutes, add 80995 code)   Topics addressed in psychotherapy include:  We discussed her negative automatic thoughts and helped her to process it.  Helped her with emotional and behavioral regulations.  Cognitive restructuring and behavioral changes.  Arsen Cerna M.D.

## 2019-07-18 RX ORDER — AZITHROMYCIN 250 MG/1
TABLET, FILM COATED ORAL
Qty: 6 TAB | Refills: 0 | Status: SHIPPED | OUTPATIENT
Start: 2019-07-18 | End: 2020-01-30

## 2019-07-18 NOTE — TELEPHONE ENCOUNTER
PT is coughing since Sunday, has not produced green sputum in the mornings. Chest irritation from cough. Dr. Verdin will be writing Rx for Zpak 250.

## 2019-07-26 ENCOUNTER — HOSPITAL ENCOUNTER (OUTPATIENT)
Dept: LAB | Facility: MEDICAL CENTER | Age: 24
End: 2019-07-26
Payer: COMMERCIAL

## 2019-07-26 LAB
BDY FAT % MEASURED: 33.2 %
BP DIAS: 49 MMHG
BP SYS: 108 MMHG
CHOLEST SERPL-MCNC: 145 MG/DL (ref 100–199)
DIABETES HTDIA: NO
EVENT NAME HTEVT: NORMAL
FASTING HTFAS: YES
GLUCOSE SERPL-MCNC: 89 MG/DL (ref 65–99)
HDLC SERPL-MCNC: 43 MG/DL
HYPERTENSION HTHYP: NO
LDLC SERPL CALC-MCNC: 88 MG/DL
SCREENING LOC CITY HTCIT: NORMAL
SCREENING LOC STATE HTSTA: NORMAL
SCREENING LOCATION HTLOC: NORMAL
SMOKING HTSMO: NO
SUBSCRIBER ID HTSID: NORMAL
TRIGL SERPL-MCNC: 68 MG/DL (ref 0–149)

## 2019-07-26 PROCEDURE — 36415 COLL VENOUS BLD VENIPUNCTURE: CPT

## 2019-07-26 PROCEDURE — 82947 ASSAY GLUCOSE BLOOD QUANT: CPT

## 2019-07-26 PROCEDURE — 80061 LIPID PANEL: CPT

## 2019-07-26 PROCEDURE — S5190 WELLNESS ASSESSMENT BY NONPH: HCPCS

## 2019-08-16 ENCOUNTER — OFFICE VISIT (OUTPATIENT)
Dept: BEHAVIORAL HEALTH | Facility: CLINIC | Age: 24
End: 2019-08-16
Payer: COMMERCIAL

## 2019-08-16 VITALS
DIASTOLIC BLOOD PRESSURE: 64 MMHG | SYSTOLIC BLOOD PRESSURE: 112 MMHG | BODY MASS INDEX: 27.66 KG/M2 | HEIGHT: 65 IN | WEIGHT: 166 LBS | HEART RATE: 84 BPM | RESPIRATION RATE: 12 BRPM

## 2019-08-16 DIAGNOSIS — F33.1 MODERATE EPISODE OF RECURRENT MAJOR DEPRESSIVE DISORDER (HCC): ICD-10-CM

## 2019-08-16 DIAGNOSIS — F41.1 GAD (GENERALIZED ANXIETY DISORDER): ICD-10-CM

## 2019-08-16 DIAGNOSIS — G47.00 INSOMNIA, UNSPECIFIED TYPE: ICD-10-CM

## 2019-08-16 PROCEDURE — 90833 PSYTX W PT W E/M 30 MIN: CPT | Performed by: PSYCHIATRY & NEUROLOGY

## 2019-08-16 PROCEDURE — 99213 OFFICE O/P EST LOW 20 MIN: CPT | Performed by: PSYCHIATRY & NEUROLOGY

## 2019-08-16 ASSESSMENT — PATIENT HEALTH QUESTIONNAIRE - PHQ9
CLINICAL INTERPRETATION OF PHQ2 SCORE: 3
SUM OF ALL RESPONSES TO PHQ QUESTIONS 1-9: 12
5. POOR APPETITE OR OVEREATING: 1 - SEVERAL DAYS

## 2019-08-16 NOTE — BH THERAPY
[facility]  PSYCHIATRIC FOLLOW-UP NOTE    Name: Isa Lamar  MRN: [id number]  : 1995  Age: 23 y.o.  Date of assessment: 2019  PCP: Dave Sanchez M.D.  Persons in attendance: Patient  Total face-to-face time: 30 minutes    REASON FOR VISIT/CHIEF COMPLAINT (as stated by Patient):  Isa Lamar is a 23 y.o.,  or  female, attending follow-up appointment for   Chief Complaint   Patient presents with   • Medication Management     The patient is seen today for follow up on her depression, anxiety, and insomnia.   .      HISTORY OF PRESENT ILLNESS:    This is a 24 yo female, single mother, employed, seen today for follow up after two months. The patient reported that her roommate moved out and she can not afford to pay the rent by herself. The is not getting any child support and she is going through the legal system. The patient is not sleeping well at night. The patient is irritated, angry, and frustrated. The patient reported that she is crying a lot, sleeping only few hours at night. The patients energy level is down. The patient reported that her mom lives in [place] and she is supportive. The patient is stressed out at work. The patient is staking sertraline 50 mg at night. The patient has lunesta for sleep and she is using OTC sleep aid.      PSYCHOSOCIAL CHANGES SINCE PREVIOUS CONTACT:  The patient is stressed out and she is depressed because of her situations.    RESPONSE TO TREATMENT:  She is taking sertraline 50 mg one a day. She has lunesta 3 mg at home for sleep.    MEDICATION SIDE EFFECTS:  Denied.    REVIEW OF SYSTEMS:        Constitutional negative   Eyes negative   Ears/Nose/Mouth/Throat negative   Cardiovascular negative   Respiratory negative   Gastrointestinal negative   Genitourinary negative   Muscular negative   Integumentary negative   Neurological negative   Endocrine negative   Hematologic/Lymphatic positive - anemia.  "      PSYCHIATRIC EXAMINATION/MENTAL STATUS  /64   Pulse 84   Resp 12   Ht 1.651 m (5' 5\")   Wt 75.3 kg (166 lb)   BMI 27.62 kg/m²   Participation: Active verbal participation and Attentive  Grooming:Casual  Orientation: Alert and Fully Oriented  Eye contact: Good  Behavior:Tense   Mood: Depressed and Anxious  Affect: Sad, Anxious and Tearful  Thought process: Logical and Goal-directed  Thought content:  Within normal limits  Speech: Rate within normal limits and Volume within normal limits  Perception:  Within normal limits  Memory:  No gross evidence of memory deficits  Insight: Good  Judgment: Good  Family/couple interaction observations:   Other:    Current risk:    Suicide: Low   Homicide: Low   Self-harm: Low  Relapse: Low  Other:   Crisis Safety Plan reviewed?Yes  If evidence of imminent risk is present, intervention/plan:    Medical Records/Labs/Diagnostic Tests Reviewed: yes.    Medical Records/Labs/Diagnostic Tests Ordered: none.    DIAGNOSTIC IMPRESSION(S):    1. Moderate episode of recurrent major depressive disorder (HCC)    2. PATIENCE (generalized anxiety disorder)    3. Insomnia, unspecified type           ASSESSMENT AND PLAN:    1. Major depression, recurrent, moderate.  2. PATIENCE.  3. Adjustment disorder with depressed mood.  Sertraline 50 mg one day # 30 refills five.    4. Insomnia.  lunesta 3 mg prn  OTC melatonin.    5. Follow up with the new provider in two months.  We discussed termination of treatment with this provider today.    17 minutes were spent in psychotherapy.  (If greater than 16 minutes, add 51702 code)   Topics addressed in psychotherapy include:  Psychoeducation and cognitive clarifications  We discussed her unresolved emotional issues and helped her with emotional skills.  Helped her with social skills, self control, and problem solving skills.   Arsen Cerna M.D.                   "

## 2019-09-17 DIAGNOSIS — Z23 NEED FOR INFLUENZA VACCINATION: ICD-10-CM

## 2019-09-30 PROCEDURE — 90471 IMMUNIZATION ADMIN: CPT | Performed by: NURSE PRACTITIONER

## 2019-09-30 PROCEDURE — 90686 IIV4 VACC NO PRSV 0.5 ML IM: CPT | Performed by: NURSE PRACTITIONER

## 2020-01-25 ENCOUNTER — HOSPITAL ENCOUNTER (OUTPATIENT)
Dept: LAB | Facility: MEDICAL CENTER | Age: 25
End: 2020-01-25
Attending: INTERNAL MEDICINE
Payer: COMMERCIAL

## 2020-01-25 DIAGNOSIS — Z11.3 SCREEN FOR STD (SEXUALLY TRANSMITTED DISEASE): ICD-10-CM

## 2020-01-25 LAB
C TRACH DNA SPEC QL NAA+PROBE: NEGATIVE
HIV 1+2 AB+HIV1 P24 AG SERPL QL IA: NON REACTIVE
N GONORRHOEA DNA SPEC QL NAA+PROBE: NEGATIVE
SPECIMEN SOURCE: NORMAL
TREPONEMA PALLIDUM IGG+IGM AB [PRESENCE] IN SERUM OR PLASMA BY IMMUNOASSAY: NON REACTIVE

## 2020-01-25 PROCEDURE — 86780 TREPONEMA PALLIDUM: CPT

## 2020-01-25 PROCEDURE — 87389 HIV-1 AG W/HIV-1&-2 AB AG IA: CPT

## 2020-01-25 PROCEDURE — 36415 COLL VENOUS BLD VENIPUNCTURE: CPT

## 2020-01-25 PROCEDURE — 87591 N.GONORRHOEAE DNA AMP PROB: CPT

## 2020-01-25 PROCEDURE — 87491 CHLMYD TRACH DNA AMP PROBE: CPT

## 2020-01-31 ENCOUNTER — APPOINTMENT (OUTPATIENT)
Dept: MEDICAL GROUP | Facility: MEDICAL CENTER | Age: 25
End: 2020-01-31
Payer: COMMERCIAL

## 2020-02-07 ENCOUNTER — OFFICE VISIT (OUTPATIENT)
Dept: MEDICAL GROUP | Facility: MEDICAL CENTER | Age: 25
End: 2020-02-07
Payer: COMMERCIAL

## 2020-02-07 VITALS
SYSTOLIC BLOOD PRESSURE: 106 MMHG | HEIGHT: 59 IN | RESPIRATION RATE: 16 BRPM | HEART RATE: 100 BPM | TEMPERATURE: 97.8 F | BODY MASS INDEX: 33.06 KG/M2 | DIASTOLIC BLOOD PRESSURE: 72 MMHG | WEIGHT: 164 LBS | OXYGEN SATURATION: 96 %

## 2020-02-07 DIAGNOSIS — Z13.6 SCREENING FOR CARDIOVASCULAR CONDITION: ICD-10-CM

## 2020-02-07 DIAGNOSIS — E66.9 OBESITY (BMI 30.0-34.9): ICD-10-CM

## 2020-02-07 PROCEDURE — 99214 OFFICE O/P EST MOD 30 MIN: CPT | Performed by: INTERNAL MEDICINE

## 2020-03-19 NOTE — PATIENT INSTRUCTIONS
Problem: Alteration in Thoughts and Perception  Goal: Refrain from acting on delusional thinking/internal stimuli  Description  Interventions:  - Monitor patient closely, per order   - Utilize least restrictive measures   - Set reasonable limits, give positive feedback for acceptable   - Administer medications as ordered and monitor of potential side effects  Outcome: Progressing     Problem: GASTROINTESTINAL - ADULT  Goal: Maintains adequate nutritional intake  Description  INTERVENTIONS:  - Monitor percentage of each meal consumed  - Identify factors contributing to decreased intake, treat as appropriate  - Assist with meals as needed  - Monitor I&O, weight, and lab values if indicated  - Obtain nutrition services referral as needed  Outcome: Progressing     Problem: Alteration in Thoughts and Perception  Goal: Complete daily ADLs, including personal hygiene independently, as able  Description  Interventions:  - Observe, teach, and assist patient with ADLS  - Monitor and promote a balance of rest/activity, with adequate nutrition and elimination   Outcome: Not Progressing   Patient is up and about this morning  He was complaint with vitals this morning  Got OOB and ate breakfast this morning  Medication complaint, then retreated right back to his room  He was isoaltive the remainder of the day except for lunch  No group attendance noted this shift  He continues to have pressured and rambling speech  Obsesses over discharge but will not listen when told what he needs to do in order to be discharged  Continue to monitor  Stimulus control (Ref: UpToDate)    Patients with insomnia may associate their bed and bedroom with the fear of not sleeping or other arousing events, rather than the more pleasurable anticipation of sleep. The longer one stays in bed trying to sleep, the stronger the association becomes. This perpetuates the difficulty falling asleep.Stimulus control therapy is a strategy whose purpose is to disrupt this association by enhancing the likelihood of sleep . Patients should not go to bed until they are sleepy and should use the bed primarily for sleep (and not for reading, watching television, eating, or worrying). They should not spend more than 20 minutes in bed awake. If they are awake after 20 minutes, they should leave the bedroom and engage in a relaxing activity, such as reading or listening to soothing music. Patients should not engage in activities that stimulate them or reward them for being awake in the middle of the night, such as eating or watching television. In addition, they should not return to bed until they are tired and feel ready to sleep. If they return to bed and still cannot sleep within 20 minutes, the process should be repeated. An alarm should be set to wake the patient at the same time every morning, including weekends. Daytime naps are not allowed.    Sleep hygiene (ref: UpToDate)  ?Sleep as long as necessary to feel rested (usually seven to eight hours for adults) and then get out of bed  ?Maintain a regular sleep schedule, particularly a regular wake-up time in the morning  ?Try not to force sleep  ?Avoid caffeinated beverages after lunch  ?Avoid alcohol near bedtime (eg, late afternoon and evening)  ?Avoid smoking or other nicotine intake, particularly during the evening  ?Adjust the bedroom environment as needed to decrease stimuli (eg, reduce ambient light, turn off the television or radio)  ?Avoid use of light-emitting screens  (laptops, tablets, smartphones, Spacebikiniooks) 2 hours before  "bedtime   ?Resolve concerns or worries before bedtime  ?Exercise regularly for at least 20 minutes, preferably more than four to five hours prior to bedtime.  ?Avoid daytime naps, especially if they are longer than 20 to 30 minutes or occur late in the day     \"Say Nuzhat to Insomnia\" by Avni Cuevas OR \"No More Sleepless Nights\" by Niall Carrasco      "

## 2020-05-15 ENCOUNTER — TELEMEDICINE (OUTPATIENT)
Dept: BEHAVIORAL HEALTH | Facility: CLINIC | Age: 25
End: 2020-05-15
Payer: COMMERCIAL

## 2020-05-15 VITALS — HEIGHT: 59 IN | WEIGHT: 154 LBS | BODY MASS INDEX: 31.04 KG/M2

## 2020-05-15 DIAGNOSIS — F41.9 ANXIETY: ICD-10-CM

## 2020-05-15 DIAGNOSIS — G47.00 INSOMNIA, UNSPECIFIED TYPE: ICD-10-CM

## 2020-05-15 DIAGNOSIS — E66.9 OBESITY (BMI 30.0-34.9): ICD-10-CM

## 2020-05-15 DIAGNOSIS — F34.1 DYSTHYMIA: ICD-10-CM

## 2020-05-15 PROCEDURE — 99214 OFFICE O/P EST MOD 30 MIN: CPT | Mod: 95,CR | Performed by: PSYCHIATRY & NEUROLOGY

## 2020-05-15 PROCEDURE — 90833 PSYTX W PT W E/M 30 MIN: CPT | Mod: 95,CR | Performed by: PSYCHIATRY & NEUROLOGY

## 2020-05-15 RX ORDER — BUPROPION HYDROCHLORIDE 150 MG/1
TABLET ORAL
Qty: 14 TAB | Refills: 0 | Status: SHIPPED | OUTPATIENT
Start: 2020-05-15 | End: 2020-08-07

## 2020-05-15 RX ORDER — BUPROPION HYDROCHLORIDE 300 MG/1
300 TABLET ORAL EVERY MORNING
Qty: 90 TAB | Refills: 0 | Status: SHIPPED | OUTPATIENT
Start: 2020-05-15 | End: 2020-08-10

## 2020-05-15 RX ORDER — CHOLECALCIFEROL (VITAMIN D3) 125 MCG
CAPSULE ORAL
Qty: 90 TAB | Refills: 1 | Status: SHIPPED | OUTPATIENT
Start: 2020-05-15 | End: 2021-03-19

## 2020-05-15 RX ORDER — TRAZODONE HYDROCHLORIDE 50 MG/1
TABLET ORAL
Qty: 30 TAB | Refills: 2 | Status: SHIPPED | OUTPATIENT
Start: 2020-05-15 | End: 2020-06-09

## 2020-05-15 ASSESSMENT — FIBROSIS 4 INDEX: FIB4 SCORE: 0.41

## 2020-05-15 NOTE — PROGRESS NOTES
"[facility]  PSYCHIATRIC FOLLOW-UP NOTE    This encounter was conducted via Zoom.   Verbal consent was obtained. Patient's identity was verified.    CC:  Presents follow up visit for medication evaluation and management      History Of Present Illness:  Isa Padgett is a 24 y.o. female with a history of major depressive disorder, generalized anxiety disorder, and insomnia, with comorbid medical problems including GERD, obstructive sleep apnea and obesity, presents today via Zoom video for follow up and to establish care with a new psychiatrist, as the patient's former psychiatrist, Dr. Cerna, retired.    The patient says that her mood has been in the middle, averaging a 5 out of 10 with 10 being a great mood, she has been more irritable over the last month.  She notices that she lacks motivation and is not as interested in things as she usually is and this is been going on for about a month.  For example she normally lives make up and she is not interested in wearing it or using it.  She knows she ought to go to the gym and workout or workout in general and she tells herself \"if I do not have to, I do not want to.\"  She says her anxiety has been pretty well controlled.  She does not let things bother her as much as she used to.  For example running late used to really upset her and now she is able to let it roll off of her back.  Regarding her insomnia some days she is able to sleep okay and then other days she has a hard time falling asleep and then wakes up during the night.  She drinks 1 cup of coffee at least in the morning.  Educated her about caffeine and that can be in her system for up to 70 hours.    The patient denied any virgie, hypomania, hallucinations, delusions or paranoia.  The patient denied OCD and panic.      Psychiatric History:  Denies any hospitalizations  Denies any history of SI, SA or self harm  Dr. Cerna beginning " in May 2019.  She was referred by her primary care physician.  Her last appointment was August 16, 2019.  She saw Dr. DOCKERY approximately 3 times  Medication trials: She is currently taking Zoloft 50 mg and previously tried citalopram and buspirone.  She also has a prescription for Ambien.      Family History:  Brother with anxiety, she denies any family history of bipolar disorder, schizophrenia or suicide attempts.    Social History:  The patient is from [place], she is the oldest of 4 children, her youngest sibling is 12 years old.  Her mother was a single mom and said the patient was always responsible for taking care of her siblings.  She is very protective of her younger siblings.  She says school is okay for her that she did get bullied in elementary school for her weight but she never felt vulnerable but more defensive about this.  She said it got better in middle school.  In high school she was very involved in her schoolwork but she did not like sports.  She works at a [employer] for a while and then she got pregnant with her daughter.  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    Depression screening:  Depression Screen (PHQ-2/PHQ-9) 5/7/2019 6/21/2019 8/16/2019   PHQ-2 Total Score - - -   PHQ-2 Total Score 3 4 3   PHQ-9 Total Score 15 10 12       Past Medical, Family and Social History reviewed with the patient.    Current medications and allergies reviewed with the patient.    REVIEW OF SYSTEMS:        Constitutional Positive - obesity   Eyes negative   Ears/Nose/Mouth/Throat negative   Cardiovascular negative   Respiratory Positive - GUERA   Gastrointestinal negative   Genitourinary negative   Muscular negative   Integumentary negative   Neurological negative   Endocrine negative  "  Hematologic/Lymphatic negative       Physical Examination and Psychiatric Mental Status Examination:  Vital signs: Ht 1.486 m (4' 10.5\")   Wt 69.9 kg (154 lb)   BMI 31.64 kg/m²     CONSTITUTIONAL:  General Appearance:  Clean, casual attire, good eye contact, engaged with provider    MUSCULOSKELETAL:  Muscle Strength and Tone:  no atrophy apparent, no abnormal movements apparent  Gait and Station:  Not able to assess.    ORIENTATION:  Oriented to time, place and person  RECENT AND REMOTE MEMORY:  Grossly intact  ATTENTION SPAN AND CONCENTRATION:  within normal range  LANGUAGE:  no deficits appreciated  FUND OF KNOWLEDGE:  has awareness of current events, past history and normal vocabulary  SPEECH:  normal volume, amount, rate and articulation, no perseveration or paucity of language  MOOD:  Mildly depressed  AFFECT:  Congruent with mood  THOUGHT PROCESS:  logical and goal directed  THOUGHT CONTENT:  Denies any SI/HI or AVH, no delusional thinking nor preoccupations appreciated  ASSOCIATIONS:  Intact, not loose, no tangentiality or circumstantiality  MEMORY:  No gross evidence of memory deficits  JUDGMENT:  adequate concerning everyday activities  INSIGHT:  adequate to psychiatric condition        DIAGNOSTIC IMPRESSION:  1. Anxiety    2. Insomnia, unspecified type    3. Dysthymia    4. Obesity (BMI 30.0-34.9)  - REFERRAL TO [facility] (HIP) Services Requested: Physician Medical Weight Management Program, Registered Dietitian for Medical Nutrition Therapy; Reason for Referral? BMI>30; Reason for Visit: Overweight/Obesity    Other orders  - buPROPion (WELLBUTRIN XL) 150 MG XL tablet; Take 1 tablet by mouth once a day in the morning for 14 days and then increase to 300 mg (new prescription provided)  Dispense: 14 Tab; Refill: 0  - buPROPion (WELLBUTRIN XL) 300 MG XL tablet; Take 1 Tab by mouth every morning.  Dispense: 90 Tab; Refill: 0  - traZODone (DESYREL) 50 MG Tab; Take 1/2 to 1 tablet by " mouth at bedtime as needed for insomnia  Dispense: 30 Tab; Refill: 2  - Melatonin 5 MG Tab; Take 1/2 to 1 tablet by mouth up to 3 hours before bedtime as needed for insomnia  Dispense: 90 Tab; Refill: 1     Asssessment and Plan:  1. Major depression, recurrent, mild - worsening  2. PATIENCE.  Cont. Sertraline 50 mg one day, has supply from her PCP Dr. Sanchez  Begin Wellbutrin XL and titrate to 300 mg  Exercise as tolerated     3.  Obesity  Referral placed to [facility] for dietitian evaluation    4. Insomnia.  Monitor caffeine intake and effect on insomnia  Begin melatonin 5 mg 1/2 to 1 up to 3 hours before bedtime   Begin trial of Trazodone 50 mg    Follow up in 4 to 6 weeks or call sooner PRN    Risks, benefits, alternatives and side effects were discussed for all medicines prescribed at this visit.  The patient voiced understanding.  The patient agrees to call the clinic with any questions or concerns, or seek emergent medical care if warranted.    Developed a safety plan with the patient including calling the [phone] crisis line number or texting, calling 911 and/or going to the nearest Emergency Department.  Handout provided to and reviewed with the patient of each of the different behavioral health crisis organizations and the corresponding contact information.    The proposed treatment plan was discussed with the patient who was provided the opportunity to ask questions and make suggestions regarding alternative treatment. The patient verbalized understanding and expressed agreement with the plan.    16 minutes or more of the visit was spent in psychotherapy.  (If  16 minutes or greater, add 45554 code)   Psychotherapy include:  Supportive psychotherapy to develop rapport and a strong therapeutic alliance with the patient, supportive psychotherapy to strengthen the patient's healthy coping strategy, to support and improve her motivation to do things such as exercise, and improve the patient  self-esteem, and psychoeducation for behavioral modifications regarding sleep hygiene and the role of caffeine and insomnia..    [place]  records reviewed through [facility] prior to prescribing controlled substances.  No concerns.  Frances Forbes M.D.    This note was created using voice recognition software (Dragon). The accuracy of the dictation is limited by the abilities of the software. I have reviewed the note prior to signing, however some errors in grammar and context are still possible. If you have any questions related to this note please do not hesitate to contact our office.

## 2020-06-09 RX ORDER — TRAZODONE HYDROCHLORIDE 50 MG/1
TABLET ORAL
Qty: 30 TAB | Refills: 2 | Status: SHIPPED | OUTPATIENT
Start: 2020-06-09 | End: 2021-03-19

## 2020-07-31 ENCOUNTER — HOSPITAL ENCOUNTER (OUTPATIENT)
Dept: LAB | Facility: MEDICAL CENTER | Age: 25
End: 2020-07-31
Attending: INTERNAL MEDICINE
Payer: COMMERCIAL

## 2020-07-31 ENCOUNTER — HOSPITAL ENCOUNTER (OUTPATIENT)
Dept: LAB | Facility: MEDICAL CENTER | Age: 25
End: 2020-07-31
Attending: OBSTETRICS & GYNECOLOGY
Payer: COMMERCIAL

## 2020-07-31 ENCOUNTER — HOSPITAL ENCOUNTER (OUTPATIENT)
Dept: LAB | Facility: MEDICAL CENTER | Age: 25
End: 2020-07-31
Payer: COMMERCIAL

## 2020-07-31 DIAGNOSIS — Z13.6 SCREENING FOR CARDIOVASCULAR CONDITION: ICD-10-CM

## 2020-07-31 LAB
ALBUMIN SERPL BCP-MCNC: 4.8 G/DL (ref 3.2–4.9)
ALBUMIN/GLOB SERPL: 1.5 G/DL
ALP SERPL-CCNC: 60 U/L (ref 30–99)
ALT SERPL-CCNC: 17 U/L (ref 2–50)
ANION GAP SERPL CALC-SCNC: 15 MMOL/L (ref 7–16)
AST SERPL-CCNC: 15 U/L (ref 12–45)
BDY FAT % MEASURED: 34.5 %
BILIRUB SERPL-MCNC: 0.6 MG/DL (ref 0.1–1.5)
BP DIAS: 49 MMHG
BP SYS: 103 MMHG
BUN SERPL-MCNC: 8 MG/DL (ref 8–22)
CALCIUM SERPL-MCNC: 9.5 MG/DL (ref 8.5–10.5)
CHLORIDE SERPL-SCNC: 103 MMOL/L (ref 96–112)
CHOLEST SERPL-MCNC: 173 MG/DL (ref 100–199)
CHOLEST SERPL-MCNC: 176 MG/DL (ref 100–199)
CO2 SERPL-SCNC: 21 MMOL/L (ref 20–33)
CREAT SERPL-MCNC: 0.88 MG/DL (ref 0.5–1.4)
DIABETES HTDIA: NO
EVENT NAME HTEVT: NORMAL
FASTING HTFAS: YES
FASTING STATUS PATIENT QL REPORTED: NORMAL
GLOBULIN SER CALC-MCNC: 3.2 G/DL (ref 1.9–3.5)
GLUCOSE SERPL-MCNC: 82 MG/DL (ref 65–99)
GLUCOSE SERPL-MCNC: 82 MG/DL (ref 65–99)
HCV AB SER QL: NORMAL
HDLC SERPL-MCNC: 49 MG/DL
HDLC SERPL-MCNC: 49 MG/DL
HIV 1+2 AB+HIV1 P24 AG SERPL QL IA: NORMAL
HYPERTENSION HTHYP: NO
LDLC SERPL CALC-MCNC: 111 MG/DL
LDLC SERPL CALC-MCNC: 115 MG/DL
POTASSIUM SERPL-SCNC: 3.6 MMOL/L (ref 3.6–5.5)
PROT SERPL-MCNC: 8 G/DL (ref 6–8.2)
SCREENING LOC CITY HTCIT: NORMAL
SCREENING LOC STATE HTSTA: NORMAL
SCREENING LOCATION HTLOC: NORMAL
SMOKING HTSMO: NO
SODIUM SERPL-SCNC: 139 MMOL/L (ref 135–145)
SUBSCRIBER ID HTSID: NORMAL
TREPONEMA PALLIDUM IGG+IGM AB [PRESENCE] IN SERUM OR PLASMA BY IMMUNOASSAY: NORMAL
TRIGL SERPL-MCNC: 62 MG/DL (ref 0–149)
TRIGL SERPL-MCNC: 63 MG/DL (ref 0–149)

## 2020-07-31 PROCEDURE — S5190 WELLNESS ASSESSMENT BY NONPH: HCPCS

## 2020-07-31 PROCEDURE — 86803 HEPATITIS C AB TEST: CPT

## 2020-07-31 PROCEDURE — 80061 LIPID PANEL: CPT

## 2020-07-31 PROCEDURE — 82947 ASSAY GLUCOSE BLOOD QUANT: CPT

## 2020-07-31 PROCEDURE — 80053 COMPREHEN METABOLIC PANEL: CPT

## 2020-07-31 PROCEDURE — 86780 TREPONEMA PALLIDUM: CPT

## 2020-07-31 PROCEDURE — 87389 HIV-1 AG W/HIV-1&-2 AB AG IA: CPT

## 2020-07-31 PROCEDURE — 36415 COLL VENOUS BLD VENIPUNCTURE: CPT

## 2020-07-31 PROCEDURE — 80061 LIPID PANEL: CPT | Mod: 91

## 2020-08-07 ENCOUNTER — OFFICE VISIT (OUTPATIENT)
Dept: MEDICAL GROUP | Facility: MEDICAL CENTER | Age: 25
End: 2020-08-07
Payer: COMMERCIAL

## 2020-08-07 VITALS
SYSTOLIC BLOOD PRESSURE: 112 MMHG | OXYGEN SATURATION: 99 % | DIASTOLIC BLOOD PRESSURE: 66 MMHG | BODY MASS INDEX: 30.84 KG/M2 | TEMPERATURE: 97.9 F | WEIGHT: 153 LBS | HEART RATE: 100 BPM | HEIGHT: 59 IN

## 2020-08-07 DIAGNOSIS — L70.9 ACNE, UNSPECIFIED ACNE TYPE: ICD-10-CM

## 2020-08-07 DIAGNOSIS — F41.9 ANXIETY: ICD-10-CM

## 2020-08-07 PROCEDURE — 99213 OFFICE O/P EST LOW 20 MIN: CPT | Performed by: INTERNAL MEDICINE

## 2020-08-07 RX ORDER — ALBUTEROL SULFATE 90 UG/1
2 AEROSOL, METERED RESPIRATORY (INHALATION) EVERY 6 HOURS PRN
Qty: 8.5 G | Refills: 11 | Status: SHIPPED | OUTPATIENT
Start: 2020-08-07 | End: 2024-01-04

## 2020-08-07 ASSESSMENT — PATIENT HEALTH QUESTIONNAIRE - PHQ9
SUM OF ALL RESPONSES TO PHQ9 QUESTIONS 1 AND 2: 1
8. MOVING OR SPEAKING SO SLOWLY THAT OTHER PEOPLE COULD HAVE NOTICED. OR THE OPPOSITE, BEING SO FIGETY OR RESTLESS THAT YOU HAVE BEEN MOVING AROUND A LOT MORE THAN USUAL: NOT AT ALL
7. TROUBLE CONCENTRATING ON THINGS, SUCH AS READING THE NEWSPAPER OR WATCHING TELEVISION: NOT AT ALL
4. FEELING TIRED OR HAVING LITTLE ENERGY: NOT AT ALL
1. LITTLE INTEREST OR PLEASURE IN DOING THINGS: NOT AT ALL
SUM OF ALL RESPONSES TO PHQ QUESTIONS 1-9: 2
6. FEELING BAD ABOUT YOURSELF - OR THAT YOU ARE A FAILURE OR HAVE LET YOURSELF OR YOUR FAMILY DOWN: NOT AL ALL
2. FEELING DOWN, DEPRESSED, IRRITABLE, OR HOPELESS: SEVERAL DAYS
5. POOR APPETITE OR OVEREATING: NOT AT ALL
3. TROUBLE FALLING OR STAYING ASLEEP OR SLEEPING TOO MUCH: SEVERAL DAYS
9. THOUGHTS THAT YOU WOULD BE BETTER OFF DEAD, OR OF HURTING YOURSELF: NOT AT ALL

## 2020-08-07 ASSESSMENT — FIBROSIS 4 INDEX: FIB4 SCORE: 0.37

## 2020-08-09 NOTE — PROGRESS NOTES
"      CC: Follow-up anxiety, complaining of acne.                                                                                                                                HPI:   Isa presents today with the following.    1. Anxiety  Presents reporting she is doing much improved since last visit stresses still present but she is dealing with it much better.  She is seeing behavioral health and is on medications    2. Acne, unspecified acne type  Requesting referral to dermatology      Patient Active Problem List    Diagnosis Date Noted   • GUERA (obstructive sleep apnea) 05/29/2018   • Insomnia 03/30/2018   • Anxiety 01/23/2018   • Obesity (BMI 30.0-34.9) 08/01/2017   • Gastroesophageal reflux disease with esophagitis 03/21/2017   • Dysthymia 11/08/2016       Current Outpatient Medications   Medication Sig Dispense Refill   • albuterol 108 (90 Base) MCG/ACT Aero Soln inhalation aerosol Inhale 2 Puffs by mouth every 6 hours as needed for Shortness of Breath. 8.5 g 11   • traZODone (DESYREL) 50 MG Tab TAKE 1/2 TO 1 TABLET BY MOUTH AT BEDTIME AS NEEDED FOR INSOMNIA 30 Tab 2   • buPROPion (WELLBUTRIN XL) 300 MG XL tablet Take 1 Tab by mouth every morning. 90 Tab 0   • Melatonin 5 MG Tab Take 1/2 to 1 tablet by mouth up to 3 hours before bedtime as needed for insomnia 90 Tab 1   • sertraline (ZOLOFT) 50 MG Tab Take 1 Tab by mouth every day. 90 Tab 3   • etonogestrel (NEXPLANON) 68 MG Implant implant Inject 1 Each as instructed Once.     • omeprazole (PRILOSEC) 40 MG delayed-release capsule Take 1 Cap by mouth every day. 30 Cap 6     No current facility-administered medications for this visit.          Allergies as of 08/07/2020   • (No Known Allergies)        ROS: Denies Chest pain, SOB, LE edema.    /66 (BP Location: Left arm, Patient Position: Sitting)   Pulse 100   Temp 36.6 °C (97.9 °F)   Ht 1.486 m (4' 10.5\")   Wt 69.4 kg (153 lb)   SpO2 99%   BMI 31.43 kg/m²     Physical Exam:  Gen:         Alert " and oriented, No apparent distress.  Neck:        No Lymphadenopathy or Bruits.  Lungs:     Clear to auscultation bilaterally  CV:          Regular rate and rhythm. No murmurs, rubs or gallops.               Ext:          No clubbing, cyanosis, edema.      Assessment and Plan.   24 y.o. female with the following issues.    1. Anxiety  Clinically doing well follow along with psychiatry    2. Acne, unspecified acne type  Referral to dermatology  - REFERRAL TO DERMATOLOGY

## 2020-08-10 RX ORDER — BUPROPION HYDROCHLORIDE 300 MG/1
TABLET ORAL
Qty: 90 TAB | Refills: 0 | Status: SHIPPED | OUTPATIENT
Start: 2020-08-10 | End: 2020-09-18 | Stop reason: SDUPTHER

## 2020-09-18 ENCOUNTER — OFFICE VISIT (OUTPATIENT)
Dept: BEHAVIORAL HEALTH | Facility: CLINIC | Age: 25
End: 2020-09-18
Payer: COMMERCIAL

## 2020-09-18 DIAGNOSIS — G47.00 INSOMNIA, UNSPECIFIED TYPE: ICD-10-CM

## 2020-09-18 DIAGNOSIS — F41.9 ANXIETY: ICD-10-CM

## 2020-09-18 DIAGNOSIS — F32.5 MAJOR DEPRESSIVE DISORDER WITH SINGLE EPISODE, IN FULL REMISSION (HCC): ICD-10-CM

## 2020-09-18 PROCEDURE — 99214 OFFICE O/P EST MOD 30 MIN: CPT | Performed by: PSYCHIATRY & NEUROLOGY

## 2020-09-18 RX ORDER — BUPROPION HYDROCHLORIDE 300 MG/1
300 TABLET ORAL EVERY MORNING
Qty: 90 TAB | Refills: 1 | Status: SHIPPED | OUTPATIENT
Start: 2020-09-18 | End: 2021-03-19 | Stop reason: SDUPTHER

## 2020-09-18 RX ORDER — HYDROXYZINE PAMOATE 25 MG/1
CAPSULE ORAL
Qty: 90 CAP | Refills: 3 | Status: SHIPPED | OUTPATIENT
Start: 2020-09-18 | End: 2021-11-12 | Stop reason: SDUPTHER

## 2020-09-18 NOTE — PROGRESS NOTES
"[facility]  PSYCHIATRIC FOLLOW-UP NOTE    In person, in office visit    CC:  Presents follow up visit for medication evaluation and management      History Of Present Illness:  Isa Padgett is a 24 y.o. female with a history of major depressive disorder, generalized anxiety disorder, and insomnia, with comorbid medical problems including GERD, obstructive sleep apnea and obesity, presents today for follow up.  She is a former patient of Dr. Cerna who retired.    The patient reported that she is \"doing okay.\"  She does not think her anxiety is excessive and says of course she is worrying about the pandemic but thinks her worry is within the normal range.  She started the Wellbutrin XL and really likes it and is at 300 mg \"it is working well.\"  Her mood has been \"good.\"  Is averaging 7-8 out of 10 with 10 being a great mood.  She says she still not sleeping well.  She tried the trazodone but it made her feel groggy the next day, cut it in half, not able to sleep through the night with the melatonin, has never tried hydroxyzine.  Her biggest problem is falling asleep.  Her PCP Dr. Sanchez is no longer going to work for [employer].    History from 5/ 15/20 visit:  \"The patient says that her mood has been in the middle, averaging a 5 out of 10 with 10 being a great mood, she has been more irritable over the last month.  She notices that she lacks motivation and is not as interested in things as she usually is and this is been going on for about a month.  For example she normally lives make up and she is not interested in wearing it or using it.  She knows she ought to go to the gym and workout or workout in general and she tells herself \"if I do not have to, I do not want to.\"  She says her anxiety has been pretty well controlled.  She does not let things bother her as much as she used to.  For example running late used to really upset her and now she " "is able to let it roll off of her back.  Regarding her insomnia some days she is able to sleep okay and then other days she has a hard time falling asleep and then wakes up during the night.  She drinks 1 cup of coffee at least in the morning.  Educated her about caffeine and that can be in her system for up to 70 hours.\"    Psychiatric History:  Denies any hospitalizations  Denies any history of SI, SA or self harm  Dr. Cerna beginning in May 2019.  She was referred by her primary care physician.  Her last appointment was August 16, 2019.  She saw Dr. DOCKERY approximately 3 times  Medication trials: She is currently taking Zoloft 50 mg and previously tried citalopram and buspirone.  She also has a prescription for Ambien.    Family History:  Brother with anxiety, she denies any family history of bipolar disorder, schizophrenia or suicide attempts.    Social History:  The patient is from [place], she is the oldest of 4 children, her youngest sibling is 12 years old.  Her mother was a single mom and said the patient was always responsible for taking care of her siblings.  She is very protective of her younger siblings.  She says school is okay for her that she did get bullied in elementary school for her weight but she never felt vulnerable but more defensive about this.  She said it got better in middle school.  In high school she was very involved in her schoolwork but she did not like sports.  She works at a [employer] for a while and then she got pregnant with her daughter.  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    Depression screening:  Depression Screen (PHQ-2/PHQ-9) 6/21/2019 8/16/2019 8/7/2020   PHQ-2 Total Score - - 1   PHQ-2 Total Score - - -   PHQ-2 Total Score 4 3 -   PHQ-9 Total Score " - - 2   PHQ-9 Total Score 10 12 -       Past Medical, Family and Social History reviewed with the patient.    Current medications and allergies reviewed with the patient.    REVIEW OF SYSTEMS:        Constitutional Positive - obesity   Eyes negative   Ears/Nose/Mouth/Throat negative   Cardiovascular negative   Respiratory Positive - GUERA   Gastrointestinal negative   Genitourinary negative   Muscular negative   Integumentary negative   Neurological negative   Endocrine negative   Hematologic/Lymphatic negative       Physical Examination and Psychiatric Mental Status Examination:  Vital signs: There were no vitals taken for this visit.    CONSTITUTIONAL:  General Appearance:  Clean, casual attire, good eye contact, engaged with provider    MUSCULOSKELETAL:  Muscle Strength and Tone:  no atrophy apparent, no abnormal movements apparent  Gait and Station:  Not able to assess.    ORIENTATION:  Oriented to time, place and person  RECENT AND REMOTE MEMORY:  Grossly intact  ATTENTION SPAN AND CONCENTRATION:  within normal range  LANGUAGE:  no deficits appreciated  FUND OF KNOWLEDGE:  has awareness of current events, past history and normal vocabulary  SPEECH:  normal volume, amount, rate and articulation, no perseveration or paucity of language  MOOD:  Euthymic  AFFECT:  Congruent with mood  THOUGHT PROCESS:  logical and goal directed  THOUGHT CONTENT:  Denies any SI/HI or AVH, no delusional thinking nor preoccupations appreciated  ASSOCIATIONS:  Intact, not loose, no tangentiality or circumstantiality  MEMORY:  No gross evidence of memory deficits  JUDGMENT:  adequate concerning everyday activities  INSIGHT:  adequate to psychiatric condition        DIAGNOSTIC IMPRESSION:  There are no diagnoses linked to this encounter.   Asssessment and Plan:  1. Major depression, recurrent, mild - worsening  2. PATIENCE.  Cont. Sertraline 50 mg one day, has supply from her PCP Dr. Sanchez  Continue Wellbutrin  mg  Exercise as  tolerated     3.  Obesity  Referral previously placed to [facility] for dietitian evaluation    4. Insomnia.  Monitor caffeine intake and effect on insomnia  D/C melatonin 5 mg 1/2 to 1 up to 3 hours before bedtime, didn't help with middle insomnia   D/C Trazodone 50 mg, groggy next day  Begin Hydroxyzine Pamoate 25 mg 1 to 3 PRN insomnia or anxiety    Follow up in 6 months or call sooner PRN    Risks, benefits, alternatives and side effects were discussed for all medicines prescribed at this visit.  The patient voiced understanding.  The patient agrees to call the clinic with any questions or concerns, or seek emergent medical care if warranted.    Developed a safety plan with the patient including calling the 1-800 crisis line number or texting, calling 911 and/or going to the nearest Emergency Department.  Handout provided to and reviewed with the patient of each of the different behavioral health crisis organizations and the corresponding contact information.    The proposed treatment plan was discussed with the patient who was provided the opportunity to ask questions and make suggestions regarding alternative treatment. The patient verbalized understanding and expressed agreement with the plan.      Frances Forbes M.D.    This note was created using voice recognition software (Dragon). The accuracy of the dictation is limited by the abilities of the software. I have reviewed the note prior to signing, however some errors in grammar and context are still possible. If you have any questions related to this note please do not hesitate to contact our office.

## 2020-09-22 ENCOUNTER — IMMUNIZATION (OUTPATIENT)
Dept: OCCUPATIONAL MEDICINE | Facility: CLINIC | Age: 25
End: 2020-09-22

## 2020-09-22 DIAGNOSIS — Z23 NEED FOR VACCINATION: ICD-10-CM

## 2020-09-22 PROCEDURE — 90686 IIV4 VACC NO PRSV 0.5 ML IM: CPT | Performed by: PREVENTIVE MEDICINE

## 2020-10-15 ENCOUNTER — OFFICE VISIT (OUTPATIENT)
Dept: PULMONOLOGY | Facility: HOSPICE | Age: 25
End: 2020-10-15
Payer: COMMERCIAL

## 2020-10-15 VITALS
OXYGEN SATURATION: 99 % | HEART RATE: 114 BPM | HEIGHT: 59 IN | RESPIRATION RATE: 16 BRPM | BODY MASS INDEX: 29.23 KG/M2 | DIASTOLIC BLOOD PRESSURE: 70 MMHG | WEIGHT: 145 LBS | SYSTOLIC BLOOD PRESSURE: 132 MMHG

## 2020-10-15 DIAGNOSIS — R63.4 WEIGHT LOSS: ICD-10-CM

## 2020-10-15 DIAGNOSIS — Z87.891 FORMER SMOKER: ICD-10-CM

## 2020-10-15 DIAGNOSIS — G47.33 OSA (OBSTRUCTIVE SLEEP APNEA): ICD-10-CM

## 2020-10-15 DIAGNOSIS — F51.04 CHRONIC INSOMNIA: ICD-10-CM

## 2020-10-15 PROCEDURE — 99213 OFFICE O/P EST LOW 20 MIN: CPT | Performed by: NURSE PRACTITIONER

## 2020-10-15 ASSESSMENT — FIBROSIS 4 INDEX: FIB4 SCORE: 0.37

## 2020-10-15 NOTE — PROGRESS NOTES
No chief complaint on file.      HPI:  Isa Padgett is a 24 y.o. year old female here today for follow-up on sleep apnea.  PMH: depression, GERD controlled with prilosec. BMI 20.    PSG mild obstructive sleep apnea as shown by the apnea hypopnea index of 7.9/hr. There was a total of 7 apneas and 51 hypopneas. In the supine position the respiratory disturbance index was 13.1 an hour and in the non-supine position the respiratory disturbance index was 9.2 per hour. The respiratory events were associated with O2 raghu of 90% and averahe O2 saturation of 95%. She spent 90 % of sleep time below 89% O2 saturation.  She was started on CPAP and dental device with minimal improvement in symptoms. She also had ongoing insomnia with no subjective benefit with use of trazodone or hydroxyzine. She is no longer taking these and feels she is overall sleeping better since losing 20lbs in weight. She would like to update HST now to re-evaluate sleep apnea.  She denies cardiac or respiratory symptoms.       ROS: As per HPI and otherwise negative if not stated.    Past Medical History:   Diagnosis Date   • Anemia 2013   • Anxiety 1/23/2018   • Depression    • Dysthymia 11/8/2016   • Infectious mononucleosis    • Nausea and vomiting in pregnancy 2/20/2013       Past Surgical History:   Procedure Laterality Date   • FINGER ORIF  6/24/2014    Performed by Ed Espitia M.D. at SURGERY [facility]   • PERCUTANEOUS PINNING  6/24/2014    Performed by Ed Espitia M.D. at [facility]       Family History   Problem Relation Age of Onset   • Cancer Maternal Aunt         breast cancer    • Hyperlipidemia Maternal Grandfather    • Diabetes Maternal Grandfather         diet control   • Hypertension Maternal Grandfather    • Heart Disease Maternal Grandfather    • Hypertension Father    • Diabetes Maternal Uncle    • Lung Disease Paternal Aunt         asthma       Social History     Socioeconomic  "History   • Marital status: Single     Spouse name: Not on file   • Number of children: Not on file   • Years of education: Not on file   • Highest education level: Not on file   Occupational History   • Not on file   Social Needs   • Financial resource strain: Not on file   • Food insecurity     Worry: Not on file     Inability: Not on file   • Transportation needs     Medical: Not on file     Non-medical: Not on file   Tobacco Use   • Smoking status: Former Smoker     Packs/day: 0.05     Years: 2.00     Pack years: 0.10     Types: Cigarettes     Quit date: 2011     Years since quittin.6   • Smokeless tobacco: Never Used   Substance and Sexual Activity   • Alcohol use: No   • Drug use: No   • Sexual activity: Yes     Partners: Male     Birth control/protection: Condom     Comment: condom   Lifestyle   • Physical activity     Days per week: Not on file     Minutes per session: Not on file   • Stress: Not on file   Relationships   • Social connections     Talks on phone: Not on file     Gets together: Not on file     Attends Mosque service: Not on file     Active member of club or organization: Not on file     Attends meetings of clubs or organizations: Not on file     Relationship status: Not on file   • Intimate partner violence     Fear of current or ex partner: Not on file     Emotionally abused: Not on file     Physically abused: Not on file     Forced sexual activity: Not on file   Other Topics Concern   • Not on file   Social History Narrative   • Not on file       Allergies as of 10/15/2020   • (No Known Allergies)        Vitals:  /70 (BP Location: Left arm, Patient Position: Sitting, BP Cuff Size: Adult)   Pulse (!) 114   Resp 16   Ht 1.486 m (4' 10.5\")   Wt 65.8 kg (145 lb)   SpO2 99%     Current medications as of today   Current Outpatient Medications   Medication Sig Dispense Refill   • buPROPion (WELLBUTRIN XL) 300 MG XL tablet Take 1 Tab by mouth every morning. 90 Tab 1   • " hydrOXYzine pamoate (VISTARIL) 25 MG Cap Take 1 to 3 tablets by mouth once a day as need for insomnia or anxiety 90 Cap 3   • omeprazole (PRILOSEC) 40 MG delayed-release capsule Take 1 Cap by mouth every day. 90 Cap 3   • albuterol 108 (90 Base) MCG/ACT Aero Soln inhalation aerosol Inhale 2 Puffs by mouth every 6 hours as needed for Shortness of Breath. 8.5 g 11   • traZODone (DESYREL) 50 MG Tab TAKE 1/2 TO 1 TABLET BY MOUTH AT BEDTIME AS NEEDED FOR INSOMNIA 30 Tab 2   • Melatonin 5 MG Tab Take 1/2 to 1 tablet by mouth up to 3 hours before bedtime as needed for insomnia 90 Tab 1   • sertraline (ZOLOFT) 50 MG Tab Take 1 Tab by mouth every day. 90 Tab 3   • etonogestrel (NEXPLANON) 68 MG Implant implant Inject 1 Each as instructed Once.       No current facility-administered medications for this visit.          Physical Exam:   Gen:           Alert and oriented, No apparent distress. Mood and affect appropriate, normal interaction with examiner.  Eyes:          PERRL, EOM intact, sclere white, conjunctive moist.  Ears:          Not examined.   Hearing:     Grossly intact.  Nose:          Normal, no lesions or deformities.  Dentition:    Good dentition.  Oropharynx:   mask  Mallampati Classification: mask  Neck:        Supple, trachea midline, no masses.  Respiratory Effort: No intercostal retractions or use of accessory muscles.   Lung Auscultation:      Clear to auscultation bilaterally; no rales, rhonchi or wheezing.  CV:            Regular rate and rhythm. No murmurs, rubs or gallops.  Abd:           Not examined.   Lymphadenopathy: Not examined.  Gait and Station: Normal.  Digits and Nails: No clubbing, cyanosis, petechiae, or nodes.   Cranial Nerves: II-XII grossly intact.  Skin:        No rashes, lesions or ulcers noted.               Ext:           No cyanosis or edema.      Assessment:  1. GUERA (obstructive sleep apnea)     2. Chronic insomnia     3. Weight loss     4. BMI 29.0-29.9,adult     5. Former smoker          Immunizations:    Flu:9/2020  Pneumovax 23:not due  Prevnar 13:not due    Plan:  1.  Patient's sleep apnea symptoms have improved.  We will reevaluate home sleep study now.  Pending results, will adjust therapy.  2.  Discussed sleep hygiene.  She is no longer requiring sleep aids and insomnia is improved.  3.  Encourage further weight loss through diet and exercise.  4.  Follow-up primary care for other health concerns.  Follow-up with psychiatry for ongoing management of depression in remission.  5.  Perform home sleep study and call results to patient, if results are improved/negative patient may be able to adjust or stop therapy.  Otherwise follow-up in 2 to 3 months, sooner if needed.    Please note that this dictation was created using voice recognition software. I have made every reasonable attempt to correct obvious errors, but it is possible there are errors of grammar and possibly content that I did not discover before finalizing the note.

## 2020-10-23 ENCOUNTER — OFFICE VISIT (OUTPATIENT)
Dept: DERMATOLOGY | Facility: IMAGING CENTER | Age: 25
End: 2020-10-23
Payer: COMMERCIAL

## 2020-10-23 VITALS — HEIGHT: 59 IN | TEMPERATURE: 98 F | BODY MASS INDEX: 29.23 KG/M2 | WEIGHT: 145 LBS

## 2020-10-23 DIAGNOSIS — L81.9 HYPERPIGMENTATION: ICD-10-CM

## 2020-10-23 DIAGNOSIS — L70.0 ACNE VULGARIS: ICD-10-CM

## 2020-10-23 PROCEDURE — 99202 OFFICE O/P NEW SF 15 MIN: CPT | Performed by: DERMATOLOGY

## 2020-10-23 RX ORDER — CLINDAMYCIN PHOSPHATE 11.9 MG/ML
SOLUTION TOPICAL
Qty: 60 ML | Refills: 3 | Status: SHIPPED | OUTPATIENT
Start: 2020-10-23 | End: 2021-06-02 | Stop reason: SDUPTHER

## 2020-10-23 ASSESSMENT — FIBROSIS 4 INDEX: FIB4 SCORE: 0.37

## 2020-10-23 NOTE — PROGRESS NOTES
CC: Acne    Subjective: new patient here for acne    Acne  Started: high school  Active on: Face, back, buttocks, chest  Aggravated by: Sugar  Treatment currently used: Neutrogena  Prior treatments used: ProActiv,   Face wash/moisturizer: Neutrogena  Family history of scarring acne: No  Contraception: Nexplanon, being removed 11/30/20  Family h/o breast/uterine/ovarian cancers: Yes, Maunt BrCa    Picks at skin often.   Using BPO wash 10% twice daily    ROS: no fevers/chills. No itch.  No cough  DermPMH: no skin cancer/melanoma  No problem-specific Assessment & Plan notes found for this encounter.    Relevant PMH: hx depression  Social: former smoker    PE: Gen:WDWN female in NAD.  Skin: diffuse pink macules, excoriated on face, upper shoulders, back and buttocks.  No papules / pustules visible.  Hyperpigmented macules in same dist.    A/P: acne, primarily excoriee:  -reviewed dx/tx considerations  -discussed picking, instructed to reduce picking  -will trial topicals for next 2-3 months, f/u to eval primary morphology/dist and decide which among orals may be additionally appropriate  -BPO wash Qday-BID home supply  -clindamycin topical Qday-BID  -tretinoin 0.025% cream QHS    PIPA:   -avoid picking  -sunprotection    I have reviewed medications relevant to my specialty.    F/u 2-3 months

## 2020-11-03 ENCOUNTER — TELEPHONE (OUTPATIENT)
Dept: SLEEP MEDICINE | Facility: MEDICAL CENTER | Age: 25
End: 2020-11-03

## 2020-11-03 DIAGNOSIS — G47.33 OSA (OBSTRUCTIVE SLEEP APNEA): ICD-10-CM

## 2020-11-03 NOTE — TELEPHONE ENCOUNTER
I schedule pt for Hst on 11/19/2020 but I do not see an order in her chart can you please sign pending order for HSt

## 2020-11-19 ENCOUNTER — HOME STUDY (OUTPATIENT)
Dept: SLEEP MEDICINE | Facility: MEDICAL CENTER | Age: 25
End: 2020-11-19
Payer: COMMERCIAL

## 2020-11-19 DIAGNOSIS — G47.33 OSA (OBSTRUCTIVE SLEEP APNEA): ICD-10-CM

## 2020-11-19 PROCEDURE — 95806 SLEEP STUDY UNATT&RESP EFFT: CPT | Performed by: INTERNAL MEDICINE

## 2020-11-30 NOTE — PROCEDURES
Interpretation:    This home sleep test was performed on 2020 using a [employer] NightOne recording device. The device has 3 sensors (effort belt, cannula and oximeter) and a built-in body position sensor that provide seven channels of data (body position, pressure flow, snore, respiratory effort, SpO2, pleth and pulse rate).  The effort belt utilizes respiratory inductive plethysmography technology.    The total recording time was 444.9 minutes, the time in bed was 444.9 minutes, and the monitoring time was 436.9 minutes.    The device recorded 1 central apneas, 5 obstructive apneas, 0 mixed apneas, 46 hypopneas, 52 apneas and hypopneas, and 0 RERAs.  The MACARIO (respiratory event index which is a surrogate for the AHI) was 7.1.  The OAI (obstructive apnea index) was 0.7.  The NARENDRA (the central apnea index) was 0.1.  The supine MACARIO was 10.7.  Most of the respiratory events occurred in the right sided position.    The patient experienced 65 desaturations and the oxygen desaturation index was 9.1.  During sleep the average saturation was 96%, the raghu saturation was 82%, and the highest saturation was 99%.  The patient spent 1.9% of TIB with saturations less than 90%.    The mean heart rate during sleep was 77 bpm, the maximum heart rate during sleep was 177 bpm, and the minimum heart rate during sleep was 57 bpm.    The patient experienced 14 snoring episodes.  The percentage of snoring was 0.5%.        Assessment:    Mild sleep apnea - MACARIO 7.1.  The MACARIO may underestimate the severity of sleep disordered breathing because the total sleep time is unknown and the flow monitoring time is used instead.  Minimal nocturnal desaturation - raghu saturation 82% - less than 90% for 1.9% of the TIB  Minimal snorin total snoring episodes/0.5% percentage of snoring        Recommendation:    If significant signs, symptoms, and or comorbidities warrant, recommend a positive airway pressure titration.  Alternative treatments for OSAH include behavioral modification, use of a dental appliance, and nasopharyngeal reconstructive surgery. Behavior modification includes weight loss, eliminating alcohol and sedatives, and avoiding the supine position. If alternative treatments are used, a follow-up diagnostic study is warranted to ensure efficacy.

## 2020-12-18 ENCOUNTER — OFFICE VISIT (OUTPATIENT)
Dept: DERMATOLOGY | Facility: IMAGING CENTER | Age: 25
End: 2020-12-18
Payer: COMMERCIAL

## 2020-12-18 DIAGNOSIS — L70.0 ACNE VULGARIS: ICD-10-CM

## 2020-12-18 DIAGNOSIS — L29.9 ITCHING: ICD-10-CM

## 2020-12-18 DIAGNOSIS — L81.9 HYPERPIGMENTATION: ICD-10-CM

## 2020-12-18 PROCEDURE — 99213 OFFICE O/P EST LOW 20 MIN: CPT | Performed by: DERMATOLOGY

## 2020-12-18 NOTE — PROGRESS NOTES
"CC: Acne      Subjective: Return patient here for acne follow up    Today pt reports much improvement with her acne, very few breakouts.  Currently using clindamycin topical Qday-BID after acne wash-BPO and tretinoin 0.025% cream QHS.  Uses cetaphil moisturizer and SPF 15 in ams.  She is very happy with this regimen.     From prior notes:  \"Previous history:  Acne  Started: high school  Active on: Face, back, buttocks, chest  Aggravated by: Sugar  Treatment currently used: Neutrogena  Prior treatments used: ProActiv,   Face wash/moisturizer: Neutrogena  Family history of scarring acne: No  Contraception: Nexplanon, being removed 11/30/20  Family h/o breast/uterine/ovarian cancers: Yes, Maunt BrCa    Picks at skin often.   Using BPO wash 10% twice daily\"    Notes cold, itching, burning of legs when outside in cold.  Tried to treat with layering, moisturizer use, without effects.    ROS: no fevers/chills. No itch.  No cough  DermPMH: no skin cancer/melanoma  No problem-specific Assessment & Plan notes found for this encounter.    Relevant PMH: hx depression  Social: former smoker    PE: Gen:WDWN female in NAD.  Skin: scalp hair dist appearing normal.  Diffuse hyperpig macules, on face.  Eyes/lips/neck - without suspicious lesions identified.      A/P: acne, primarily excoriee: appearing improved today  -reviewed dx/tx considerations  Cont current regimen, okay to rf up to 1 year  -BPO wash Qday-BID home supply  -clindamycin topical Qday-BID  -tretinoin 0.025% cream QHS, stop if planning pregnancy    PIPA:   -avoid picking  -sunprotection    Dysethesia, legs in cold:  -reviewed longer insulating coat/coverup, layering.  Heat units in pockets/heated vest on [employer]  -f/u PRN    I have reviewed medications relevant to my specialty.    F/u PRN      "

## 2020-12-20 DIAGNOSIS — Z23 NEED FOR VACCINATION: ICD-10-CM

## 2020-12-30 ENCOUNTER — IMMUNIZATION (OUTPATIENT)
Dept: FAMILY PLANNING/WOMEN'S HEALTH CLINIC | Facility: IMMUNIZATION CENTER | Age: 25
End: 2020-12-30
Attending: FAMILY MEDICINE
Payer: COMMERCIAL

## 2020-12-30 DIAGNOSIS — Z23 ENCOUNTER FOR VACCINATION: Primary | ICD-10-CM

## 2020-12-30 DIAGNOSIS — Z23 NEED FOR VACCINATION: ICD-10-CM

## 2020-12-30 PROCEDURE — 0011A MODERNA SARS-COV-2 VACCINE: CPT

## 2020-12-30 PROCEDURE — 91301 MODERNA SARS-COV-2 VACCINE: CPT

## 2021-01-28 ENCOUNTER — HOSPITAL ENCOUNTER (OUTPATIENT)
Dept: LAB | Facility: MEDICAL CENTER | Age: 26
End: 2021-01-28
Attending: OBSTETRICS & GYNECOLOGY
Payer: COMMERCIAL

## 2021-01-28 LAB
APPEARANCE UR: ABNORMAL
BACTERIA #/AREA URNS HPF: ABNORMAL /HPF
BILIRUB UR QL STRIP.AUTO: NEGATIVE
COLOR UR: YELLOW
EPI CELLS #/AREA URNS HPF: ABNORMAL /HPF
GLUCOSE UR STRIP.AUTO-MCNC: NEGATIVE MG/DL
KETONES UR STRIP.AUTO-MCNC: NEGATIVE MG/DL
LEUKOCYTE ESTERASE UR QL STRIP.AUTO: ABNORMAL
MICRO URNS: ABNORMAL
NITRITE UR QL STRIP.AUTO: POSITIVE
PH UR STRIP.AUTO: 8 [PH] (ref 5–8)
PROT UR QL STRIP: NEGATIVE MG/DL
RBC # URNS HPF: ABNORMAL /HPF
RBC UR QL AUTO: NEGATIVE
SP GR UR STRIP.AUTO: 1.02
UROBILINOGEN UR STRIP.AUTO-MCNC: 0.2 MG/DL
WBC #/AREA URNS HPF: ABNORMAL /HPF

## 2021-01-28 PROCEDURE — 87591 N.GONORRHOEAE DNA AMP PROB: CPT

## 2021-01-28 PROCEDURE — 87491 CHLMYD TRACH DNA AMP PROBE: CPT

## 2021-01-28 PROCEDURE — 87077 CULTURE AEROBIC IDENTIFY: CPT | Mod: 91

## 2021-01-28 PROCEDURE — 81001 URINALYSIS AUTO W/SCOPE: CPT

## 2021-01-28 PROCEDURE — 87186 SC STD MICRODIL/AGAR DIL: CPT

## 2021-01-28 PROCEDURE — 87086 URINE CULTURE/COLONY COUNT: CPT

## 2021-01-30 LAB
BACTERIA UR CULT: ABNORMAL
BACTERIA UR CULT: ABNORMAL
C TRACH DNA SPEC QL NAA+PROBE: NEGATIVE
N GONORRHOEA DNA SPEC QL NAA+PROBE: NEGATIVE
SIGNIFICANT IND 70042: ABNORMAL
SITE SITE: ABNORMAL
SOURCE SOURCE: ABNORMAL
SPECIMEN SOURCE: NORMAL

## 2021-01-30 PROCEDURE — 0012A MODERNA SARS-COV-2 VACCINE: CPT

## 2021-01-30 PROCEDURE — 91301 MODERNA SARS-COV-2 VACCINE: CPT

## 2021-01-31 ENCOUNTER — IMMUNIZATION (OUTPATIENT)
Dept: FAMILY PLANNING/WOMEN'S HEALTH CLINIC | Facility: IMMUNIZATION CENTER | Age: 26
End: 2021-01-31
Payer: COMMERCIAL

## 2021-01-31 DIAGNOSIS — Z23 ENCOUNTER FOR VACCINATION: Primary | ICD-10-CM

## 2021-02-10 ENCOUNTER — HOSPITAL ENCOUNTER (OUTPATIENT)
Dept: RADIOLOGY | Facility: MEDICAL CENTER | Age: 26
End: 2021-02-10
Attending: NURSE PRACTITIONER
Payer: COMMERCIAL

## 2021-02-10 ENCOUNTER — OFFICE VISIT (OUTPATIENT)
Dept: URGENT CARE | Facility: PHYSICIAN GROUP | Age: 26
End: 2021-02-10
Payer: COMMERCIAL

## 2021-02-10 VITALS
RESPIRATION RATE: 14 BRPM | HEIGHT: 59 IN | DIASTOLIC BLOOD PRESSURE: 70 MMHG | OXYGEN SATURATION: 100 % | TEMPERATURE: 97.9 F | WEIGHT: 150 LBS | SYSTOLIC BLOOD PRESSURE: 102 MMHG | BODY MASS INDEX: 30.24 KG/M2 | HEART RATE: 78 BPM

## 2021-02-10 DIAGNOSIS — S89.90XA KNEE INJURY, INITIAL ENCOUNTER: ICD-10-CM

## 2021-02-10 DIAGNOSIS — S83.92XA SPRAIN OF LEFT KNEE, UNSPECIFIED LIGAMENT, INITIAL ENCOUNTER: Primary | ICD-10-CM

## 2021-02-10 DIAGNOSIS — M25.562 ACUTE PAIN OF LEFT KNEE: ICD-10-CM

## 2021-02-10 PROCEDURE — 99214 OFFICE O/P EST MOD 30 MIN: CPT | Performed by: NURSE PRACTITIONER

## 2021-02-10 PROCEDURE — 73564 X-RAY EXAM KNEE 4 OR MORE: CPT | Mod: LT

## 2021-02-10 PROCEDURE — 73564 X-RAY EXAM KNEE 4 OR MORE: CPT | Mod: RT

## 2021-02-10 ASSESSMENT — ENCOUNTER SYMPTOMS
FEVER: 0
FALLS: 0
TREMORS: 0
SENSORY CHANGE: 0
NAUSEA: 0
TINGLING: 0
MYALGIAS: 0

## 2021-02-10 ASSESSMENT — FIBROSIS 4 INDEX: FIB4 SCORE: 0.39

## 2021-02-10 ASSESSMENT — PAIN SCALES - GENERAL: PAINLEVEL: 8=MODERATE-SEVERE PAIN

## 2021-02-10 ASSESSMENT — LIFESTYLE VARIABLES: SUBSTANCE_ABUSE: 0

## 2021-02-10 NOTE — LETTER
February 10, 2021       Patient: Isa Padgett   YOB: 1995   Date of Visit: 2/10/2021         To Whom It May Concern:    In my medical opinion, I recommend that Isa Padgett remain out of work until 02/11/2021              Sincerely,          IVELISSE CalvoPSHON.  Electronically Signed

## 2021-02-10 NOTE — PROGRESS NOTES
"Isa Padgett is a 25 y.o. female who presents for Knee Pain (twisted knee yesterday )      HPI  This is a new problem.  24 y/o female with c/o left knee pain. Onset yesterday @ 7 pm  She went to bed to  lay down in bed and she had immediate pain after twisting her knee when trying to get into her bed. She heard \" snap, crackle and pop\". Having a knee in a slightly bent position makes the pain better.  Pain 8/10, intermittent. Pain radiates into thigh and calf. Treatment tried: ice, ibuprofen 600 mg ( none today). She has been able to walk on her leg but it hurts. No previous injuries.   No other aggravating or alleviating factors.     Review of Systems   Constitutional: Negative for fever.   Gastrointestinal: Negative for nausea.   Musculoskeletal: Positive for joint pain. Negative for falls and myalgias.   Neurological: Negative for tingling, tremors and sensory change.   Psychiatric/Behavioral: Negative for substance abuse.       No Known Allergies  Past Medical History:   Diagnosis Date   • Anemia 2013   • Anxiety 1/23/2018   • Depression    • Dysthymia 11/8/2016   • Infectious mononucleosis    • Nausea and vomiting in pregnancy 2/20/2013     Past Surgical History:   Procedure Laterality Date   • FINGER ORIF  6/24/2014    Performed by Ed Espitia M.D. at [facility]   • PERCUTANEOUS PINNING  6/24/2014    Performed by Ed Espitia M.D. at [facility]     Family History   Problem Relation Age of Onset   • Cancer Maternal Aunt         breast cancer    • Hyperlipidemia Maternal Grandfather    • Diabetes Maternal Grandfather         diet control   • Hypertension Maternal Grandfather    • Heart Disease Maternal Grandfather    • Hypertension Father    • Diabetes Maternal Uncle    • Lung Disease Paternal Aunt         asthma     Social History     Tobacco Use   • Smoking status: Former Smoker     Packs/day: 0.05     Years: 2.00     Pack years: 0.10     Types: " "Cigarettes     Quit date: 2011     Years since quittin.9   • Smokeless tobacco: Never Used   Substance Use Topics   • Alcohol use: No     Patient Active Problem List   Diagnosis   • Dysthymia   • Gastroesophageal reflux disease with esophagitis   • Obesity (BMI 30.0-34.9)   • Anxiety   • Insomnia   • GUERA (obstructive sleep apnea)   • Major depressive disorder with single episode, in full remission ([facility])   • BMI 29.0-29.9,adult     Current Outpatient Medications on File Prior to Visit   Medication Sig Dispense Refill   • clindamycin (CLEOCIN) 1 % Solution AAA face/shoulders Qday-BID after benzoyl peroxide wash for acne 60 mL 3   • tretinoin (RETIN-A) 0.025 % cream AAA face/shoulders QHS for acne 45 g 3   • buPROPion (WELLBUTRIN XL) 300 MG XL tablet Take 1 Tab by mouth every morning. 90 Tab 1   • hydrOXYzine pamoate (VISTARIL) 25 MG Cap Take 1 to 3 tablets by mouth once a day as need for insomnia or anxiety 90 Cap 3   • omeprazole (PRILOSEC) 40 MG delayed-release capsule Take 1 Cap by mouth every day. 90 Cap 3   • albuterol 108 (90 Base) MCG/ACT Aero Soln inhalation aerosol Inhale 2 Puffs by mouth every 6 hours as needed for Shortness of Breath. 8.5 g 11   • traZODone (DESYREL) 50 MG Tab TAKE 1/2 TO 1 TABLET BY MOUTH AT BEDTIME AS NEEDED FOR INSOMNIA 30 Tab 2   • Melatonin 5 MG Tab Take 1/2 to 1 tablet by mouth up to 3 hours before bedtime as needed for insomnia 90 Tab 1   • sertraline (ZOLOFT) 50 MG Tab Take 1 Tab by mouth every day. 90 Tab 3   • etonogestrel (NEXPLANON) 68 MG Implant implant Inject 1 Each as instructed Once.       No current facility-administered medications on file prior to visit.          Objective:     /70   Pulse 78   Temp 36.6 °C (97.9 °F)   Resp 14   Ht 1.486 m (4' 10.5\")   Wt 68 kg (150 lb)   SpO2 100%   BMI 30.82 kg/m²     Physical Exam  Vitals and nursing note reviewed.   Constitutional:       General: She is not in acute distress.     Appearance: Normal appearance. " She is well-developed and normal weight. She is not toxic-appearing.   HENT:      Head: Normocephalic.      Mouth/Throat:      Mouth: Mucous membranes are moist.   Cardiovascular:      Rate and Rhythm: Normal rate and regular rhythm.      Pulses: Normal pulses.      Heart sounds: Normal heart sounds.   Pulmonary:      Effort: Pulmonary effort is normal.      Breath sounds: Normal breath sounds.   Musculoskeletal:      Left knee: No swelling, effusion or erythema. Decreased range of motion. Tenderness (generalized) present. No LCL laxity, MCL laxity, ACL laxity or PCL laxity.Normal alignment, normal meniscus and normal patellar mobility. Normal pulse.   Skin:     General: Skin is warm and dry.      Capillary Refill: Capillary refill takes less than 2 seconds.   Neurological:      Mental Status: She is alert and oriented to person, place, and time.   Psychiatric:         Mood and Affect: Mood normal.         Behavior: Behavior normal. Behavior is cooperative.         Thought Content: Thought content normal.       RADIOLOGY RESULTS   DX-KNEE COMPLETE 4+ LEFT    Result Date: 2/10/2021  2/10/2021 12:04 PM HISTORY/REASON FOR EXAM:  Medial left knee pain after twisting injury last night. TECHNIQUE/EXAM DESCRIPTION AND NUMBER OF VIEWS:  4 views of the LEFT knee. COMPARISON: None FINDINGS: The alignment of the knee is within normal limits. There is no definite  joint effusion. There is no evidence of displaced fracture or dislocation. There is no focal swelling.     Negative LEFT knee series. INTERPRETING LOCATION: [address] [place] [address] [place], [address]         Xray: Reviewed and interpreted independently by me. I agree with the radiologist's findings.       Assessment /Associated Orders:      1. Sprain of left knee, unspecified ligament, initial encounter  REFERRAL TO SPORTS MEDICINE   2. Knee injury, initial encounter  DX-KNEE COMPLETE 4+ RIGHT    REFERRAL TO SPORTS MEDICINE   3. Acute pain of left knee  DX-KNEE COMPLETE  4+ RIGHT         Medical Decision Making:      VSS at today's acute urgent care visit.   OTC  analgesic of choice (acetaminophen or NSAID). Follow manufactures dosing and safety precautions.   Knee splint applied in  today   Referral to sports medicine for FU   PRICE therapy:   P- protect from further injury  R- rest the affected area as much as possible while pain and swelling persist  I- Ice packs - 15 minutes every 2 hours for the first 24 hours, then 4-5 times daily   C- compress either with splint or ace wrap as directed  E-elevate the extremity to help with swelling and pain      Discussed management options (risks,benefits, and alternatives to treatment). Pt expresses understanding and the treatment plan was agreed upon. Questions were encouraged and answered to pt's satisfaction.   Advised to follow-up with the primary care physician for recheck, reevaluation, and consideration of further management if necessary.   Return to urgent care prn if new or worsening sx or if there is no improvement in condition prn. Red flags discussed and indications to immediately call 911 or present to the Emergency Department.     I personally reviewed prior external notes and test results pertinent to today's visit.  I have independently reviewed and interpreted all diagnostics ordered during this urgent care acute visit.   Time spent evaluating this patient was at least 30 minutes and includes preparing for visit, counseling/education, exam and evaluation, obtaining history, independent interpretation and ordering lab/test/procedures/medication management. This does not include time spent on separately billable procedures/tests.      Please note that this dictation was created using voice recognition software. I have made a reasonable attempt to correct obvious errors, but I expect that there are errors of grammar and possibly content that I did not discover before finalizing the note.    This note was electronically  signed by Shana Mckeon APRN, Urgent Care

## 2021-02-19 ENCOUNTER — OFFICE VISIT (OUTPATIENT)
Dept: MEDICAL GROUP | Facility: CLINIC | Age: 26
End: 2021-02-19
Payer: COMMERCIAL

## 2021-02-19 VITALS
RESPIRATION RATE: 18 BRPM | WEIGHT: 150 LBS | HEIGHT: 59 IN | HEART RATE: 109 BPM | TEMPERATURE: 99.3 F | DIASTOLIC BLOOD PRESSURE: 68 MMHG | OXYGEN SATURATION: 96 % | BODY MASS INDEX: 30.24 KG/M2 | SYSTOLIC BLOOD PRESSURE: 112 MMHG

## 2021-02-19 DIAGNOSIS — S83.002A PATELLAR SUBLUXATION, LEFT, INITIAL ENCOUNTER: ICD-10-CM

## 2021-02-19 PROCEDURE — 99203 OFFICE O/P NEW LOW 30 MIN: CPT | Performed by: FAMILY MEDICINE

## 2021-02-19 SDOH — ECONOMIC STABILITY: TRANSPORTATION INSECURITY
IN THE PAST 12 MONTHS, HAS LACK OF RELIABLE TRANSPORTATION KEPT YOU FROM MEDICAL APPOINTMENTS, MEETINGS, WORK OR FROM GETTING THINGS NEEDED FOR DAILY LIVING?: NO

## 2021-02-19 SDOH — ECONOMIC STABILITY: TRANSPORTATION INSECURITY
IN THE PAST 12 MONTHS, HAS THE LACK OF TRANSPORTATION KEPT YOU FROM MEDICAL APPOINTMENTS OR FROM GETTING MEDICATIONS?: NO

## 2021-02-19 SDOH — ECONOMIC STABILITY: INCOME INSECURITY: IN THE LAST 12 MONTHS, WAS THERE A TIME WHEN YOU WERE NOT ABLE TO PAY THE MORTGAGE OR RENT ON TIME?: NO

## 2021-02-19 SDOH — HEALTH STABILITY: PHYSICAL HEALTH: ON AVERAGE, HOW MANY DAYS PER WEEK DO YOU ENGAGE IN MODERATE TO STRENUOUS EXERCISE (LIKE A BRISK WALK)?: 5 DAYS

## 2021-02-19 SDOH — ECONOMIC STABILITY: HOUSING INSECURITY
IN THE LAST 12 MONTHS, WAS THERE A TIME WHEN YOU DID NOT HAVE A STEADY PLACE TO SLEEP OR SLEPT IN A SHELTER (INCLUDING NOW)?: NO

## 2021-02-19 SDOH — ECONOMIC STABILITY: HOUSING INSECURITY: IN THE LAST 12 MONTHS, HOW MANY PLACES HAVE YOU LIVED?: 1

## 2021-02-19 SDOH — HEALTH STABILITY: PHYSICAL HEALTH: ON AVERAGE, HOW MANY MINUTES DO YOU ENGAGE IN EXERCISE AT THIS LEVEL?: 30 MINUTES

## 2021-02-19 SDOH — HEALTH STABILITY: MENTAL HEALTH
STRESS IS WHEN SOMEONE FEELS TENSE, NERVOUS, ANXIOUS, OR CAN'T SLEEP AT NIGHT BECAUSE THEIR MIND IS TROUBLED. HOW STRESSED ARE YOU?: TO SOME EXTENT

## 2021-02-19 ASSESSMENT — SOCIAL DETERMINANTS OF HEALTH (SDOH)
ARE YOU MARRIED, WIDOWED, DIVORCED, SEPARATED, NEVER MARRIED, OR LIVING WITH A PARTNER?: NEVER MARRIED
HOW OFTEN DO YOU HAVE SIX OR MORE DRINKS ON ONE OCCASION: NEVER
HOW OFTEN DO YOU GET TOGETHER WITH FRIENDS OR RELATIVES?: MORE THAN THREE TIMES A WEEK
HOW OFTEN DO YOU ATTEND CHURCH OR RELIGIOUS SERVICES?: NEVER
IN A TYPICAL WEEK, HOW MANY TIMES DO YOU TALK ON THE PHONE WITH FAMILY, FRIENDS, OR NEIGHBORS?: MORE THAN THREE TIMES A WEEK
HOW OFTEN DO YOU ATTENT MEETINGS OF THE CLUB OR ORGANIZATION YOU BELONG TO?: NEVER
WITHIN THE PAST 12 MONTHS, YOU WORRIED THAT YOUR FOOD WOULD RUN OUT BEFORE YOU GOT THE MONEY TO BUY MORE: NEVER TRUE
HOW OFTEN DO YOU HAVE A DRINK CONTAINING ALCOHOL: NEVER
DO YOU BELONG TO ANY CLUBS OR ORGANIZATIONS SUCH AS CHURCH GROUPS UNIONS, FRATERNAL OR ATHLETIC GROUPS, OR SCHOOL GROUPS?: NO
WITHIN THE PAST 12 MONTHS, THE FOOD YOU BOUGHT JUST DIDN'T LAST AND YOU DIDN'T HAVE MONEY TO GET MORE: NEVER TRUE
HOW HARD IS IT FOR YOU TO PAY FOR THE VERY BASICS LIKE FOOD, HOUSING, MEDICAL CARE, AND HEATING?: NOT VERY HARD

## 2021-02-19 ASSESSMENT — FIBROSIS 4 INDEX: FIB4 SCORE: 0.39

## 2021-02-19 NOTE — PROGRESS NOTES
CHIEF COMPLAINT:  Isa Padgett female presenting at the request of SERGIO Calvo  for evaluation of knee pain.     Isa Padgett is complaining of left knee pain  2/10/2021 twisted leg while at home getting into bed  Popping, cracking and pain  Unable to walk immediatley after injury  Pain is at the medial , posterior knee  Quality is burning, sharp, Dull  Pain is radiating to medial thigh and leg   Improved with stretching and walking  Aggravated by standing, walking for long periods, stairs  no prior problems with this area in the past   Prior Treatments: seen at   Prior studies: X-Ray   Medications tried for pain include: none  Mechanical Symptom history: No Locking and Grinding which is not necessarily painful    Medical assistant at [employer]  Likes working out at home, squatting and HIT exercises    REVIEW OF SYSTEMS  No Nausea, No Vomiting, No Chest Pain, No Shortness of Breath, No Dizziness, No Headache      PAST MEDICAL HISTORY:   History reviewed. No pertinent past medical history.    PMH:  has a past medical history of Anemia (2013), Anxiety (1/23/2018), Depression, Dysthymia (11/8/2016), Infectious mononucleosis, and Nausea and vomiting in pregnancy (2/20/2013). She also has no past medical history of Addisons disease ([facility]), Adrenal disorder ([facility]), Allergy, ASTHMA, Blood transfusion, CATARACT, CHF (congestive heart failure) ([facility]), Clotting disorder ([facility]), COPD, Cushings syndrome ([facility]), Diabetes, Diabetic neuropathy ([facility]), EMPHYSEMA, Goiter, Headache(784.0), Heart attack ([facility]), HIV (human immunodeficiency virus infection), Hyperlipidemia, IBD (inflammatory bowel disease), Kidney disease, Meningitis, Muscle disorder, OSTEOPOROSIS, Parathyroid disorder (HCC), Pituitary disease (HCC), Sickle cell disease ([facility]), Substance abuse ([facility]), Thyroid disease, Ulcer, or Urinary tract infection, site not specified.  MEDS:   Current Outpatient Medications:   •   clindamycin (CLEOCIN) 1 % Solution, AAA face/shoulders Qday-BID after benzoyl peroxide wash for acne, Disp: 60 mL, Rfl: 3  •  tretinoin (RETIN-A) 0.025 % cream, AAA face/shoulders QHS for acne, Disp: 45 g, Rfl: 3  •  buPROPion (WELLBUTRIN XL) 300 MG XL tablet, Take 1 Tab by mouth every morning., Disp: 90 Tab, Rfl: 1  •  hydrOXYzine pamoate (VISTARIL) 25 MG Cap, Take 1 to 3 tablets by mouth once a day as need for insomnia or anxiety, Disp: 90 Cap, Rfl: 3  •  omeprazole (PRILOSEC) 40 MG delayed-release capsule, Take 1 Cap by mouth every day., Disp: 90 Cap, Rfl: 3  •  albuterol 108 (90 Base) MCG/ACT Aero Soln inhalation aerosol, Inhale 2 Puffs by mouth every 6 hours as needed for Shortness of Breath., Disp: 8.5 g, Rfl: 11  •  traZODone (DESYREL) 50 MG Tab, TAKE 1/2 TO 1 TABLET BY MOUTH AT BEDTIME AS NEEDED FOR INSOMNIA, Disp: 30 Tab, Rfl: 2  •  Melatonin 5 MG Tab, Take 1/2 to 1 tablet by mouth up to 3 hours before bedtime as needed for insomnia, Disp: 90 Tab, Rfl: 1  •  sertraline (ZOLOFT) 50 MG Tab, Take 1 Tab by mouth every day., Disp: 90 Tab, Rfl: 3  •  etonogestrel (NEXPLANON) 68 MG Implant implant, Inject 1 Each as instructed Once., Disp: , Rfl:   ALLERGIES: No Known Allergies  SURGHX:   Past Surgical History:   Procedure Laterality Date   • FINGER ORIF  6/24/2014    Performed by Ed Espitia M.D. at [facility]   • PERCUTANEOUS PINNING  6/24/2014    Performed by Ed Espitia M.D. at [facility]     SOCHX:  reports that she quit smoking about 10 years ago. Her smoking use included cigarettes. She has a 0.10 pack-year smoking history. She has never used smokeless tobacco. She reports that she does not drink alcohol and does not use drugs.  FH: Family history was reviewed, no pertinent findings to report     PHYSICAL EXAM:  /68 (BP Location: Left arm, Patient Position: Sitting, BP Cuff Size: Adult)   Pulse (!) 109   Temp 37.4 °C (99.3 °F) (Temporal)   Resp 18   Ht  "1.486 m (4' 10.5\")   Wt 68 kg (150 lb)   SpO2 96%   BMI 30.82 kg/m²      slightly overweight in no apparent distress, alert and oriented x 3.  Gait: antalgic     RIGHT Knee:  Slight Varus and No Swelling  Range of Motion Intact  Trace effusion  Patellar No tenderness and no apprehension  Medial Joint Line Non-tender and NEGATIVE Brad  Lateral Joint Line Non-tender and NEGATIVE Brad  Trace Laxity with Varus stress  Trace Laxity with Valgus stress  Lachman's testing is Trace  Posterior Drawer Testing is Trace  The leg is otherwise neurovascularly intact    LEFT Knee:  Slight Varus and No Swelling   Range of Motion Slightly limited with Flexion  Trace effusion  Patellar Medial facet tenderness and Apprehension  Medial Joint Line Non-tender and NEGATIVE Brad  Lateral Joint Line Non-tender and NEGATIVE Brad  Trace Laxity with Varus stress  Trace Laxity with Valgus stress  Lachman's testing is Trace  Posterior Drawer Testing is Trace  The leg is otherwise neurovascularly intact    Additional Findings: None      1. Patellar subluxation, left, initial encounter  REFERRAL TO PHYSICAL THERAPY     2/10/2021 twisted leg while at home getting into bed  Popping, cracking and pain  Unable to walk immediatley after injury    Patient was provided with home exercise program  Recommend switching her knee brace to a patellofemoral tracking brace  Referral for formal physical therapy    No follow-ups on file.    2/10/2021 12:04 PM     HISTORY/REASON FOR EXAM:  Medial left knee pain after twisting injury last night.     TECHNIQUE/EXAM DESCRIPTION AND NUMBER OF VIEWS:  4 views of the LEFT knee.     COMPARISON: None     FINDINGS:     The alignment of the knee is within normal limits.  There is no definite  joint effusion.  There is no evidence of displaced fracture or dislocation.  There is no focal swelling.        IMPRESSION:     Negative LEFT knee series.    done elsewhere and reviewed independently by me    Thank you " Shana Mckeon, AMarcosPSHON. for allowing me to participate in caring for your patient.

## 2021-03-19 ENCOUNTER — TELEMEDICINE (OUTPATIENT)
Dept: MEDICAL GROUP | Facility: MEDICAL CENTER | Age: 26
End: 2021-03-19
Payer: COMMERCIAL

## 2021-03-19 ENCOUNTER — TELEMEDICINE (OUTPATIENT)
Dept: BEHAVIORAL HEALTH | Facility: CLINIC | Age: 26
End: 2021-03-19
Payer: COMMERCIAL

## 2021-03-19 VITALS — WEIGHT: 146 LBS | HEIGHT: 59 IN | BODY MASS INDEX: 29.43 KG/M2

## 2021-03-19 DIAGNOSIS — R23.9 SKIN CHANGE: ICD-10-CM

## 2021-03-19 DIAGNOSIS — G47.00 INSOMNIA, UNSPECIFIED TYPE: ICD-10-CM

## 2021-03-19 DIAGNOSIS — R20.9 COLD EXTREMITIES: ICD-10-CM

## 2021-03-19 DIAGNOSIS — Z76.89 ENCOUNTER TO ESTABLISH CARE: ICD-10-CM

## 2021-03-19 DIAGNOSIS — H04.123 DRY EYES: ICD-10-CM

## 2021-03-19 DIAGNOSIS — F33.2 SEVERE EPISODE OF RECURRENT MAJOR DEPRESSIVE DISORDER, WITHOUT PSYCHOTIC FEATURES (HCC): ICD-10-CM

## 2021-03-19 DIAGNOSIS — F41.9 ANXIETY: ICD-10-CM

## 2021-03-19 DIAGNOSIS — F41.1 GAD (GENERALIZED ANXIETY DISORDER): ICD-10-CM

## 2021-03-19 PROBLEM — F32.5 MAJOR DEPRESSIVE DISORDER WITH SINGLE EPISODE, IN FULL REMISSION (HCC): Status: RESOLVED | Noted: 2020-09-18 | Resolved: 2021-03-19

## 2021-03-19 PROCEDURE — 90833 PSYTX W PT W E/M 30 MIN: CPT | Mod: 95,CR | Performed by: PSYCHIATRY & NEUROLOGY

## 2021-03-19 PROCEDURE — 99214 OFFICE O/P EST MOD 30 MIN: CPT | Mod: 95,CR | Performed by: PSYCHIATRY & NEUROLOGY

## 2021-03-19 PROCEDURE — 99213 OFFICE O/P EST LOW 20 MIN: CPT | Performed by: NURSE PRACTITIONER

## 2021-03-19 RX ORDER — BUPROPION HYDROCHLORIDE 300 MG/1
300 TABLET ORAL EVERY MORNING
Qty: 90 TABLET | Refills: 0 | Status: SHIPPED | OUTPATIENT
Start: 2021-03-19 | End: 2021-06-11

## 2021-03-19 RX ORDER — SERTRALINE HYDROCHLORIDE 100 MG/1
100 TABLET, FILM COATED ORAL DAILY
Qty: 135 TABLET | Refills: 0 | Status: SHIPPED | OUTPATIENT
Start: 2021-03-19 | End: 2021-06-09

## 2021-03-19 RX ORDER — BUPROPION HYDROCHLORIDE 150 MG/1
150 TABLET ORAL EVERY MORNING
Qty: 90 TABLET | Refills: 0 | Status: SHIPPED | OUTPATIENT
Start: 2021-03-19 | End: 2021-06-09

## 2021-03-19 ASSESSMENT — FIBROSIS 4 INDEX: FIB4 SCORE: 0.39

## 2021-03-19 NOTE — PROGRESS NOTES
"[facility]  PSYCHIATRIC FOLLOW-UP NOTE    This evaluation was conducted via Zoom, using secure and encrypted videoconferencing technology. The patient was physical located at their home address in [place], [place], and the physician was located at [facility] in [place], [place]. The patient was presented by self, at home. The patient’s identity was confirmed and verbal consent for the telemedicine encounter was obtained.    CC:  Presents for follow up visit for medication evaluation and management      History Of Present Illness:  Isa Padgett is a 24 y.o. female with a history of major depressive disorder, generalized anxiety disorder, and insomnia, with comorbid medical problems including GERD, obstructive sleep apnea and obesity, presents today for follow up.  She is a former patient of Dr. Cerna who retired.    The patient reported that she is not doing well.  \"It's been a horrible 3 weeks.\" She says she has been spiraling down, having panic attacks, feeling depressed. Confidence shaking, having higher social anxiety, not sleeping well.  It seems to follow an encounter at work where her new supervisor is trying to get to know workers and got angry with the patient and then apologized. The patient says she gives 150% at work and is not even recognized for giving 100%.  She denies any SI. She didn't go to work today and she is taking week after next off.  She keeps quiet at work and says others can perceive her as being cold or mad when in fact she is just highly anxious.  She is so anxious that she cannot be assertive in interactions and can't advocate for herself.  She denies any virgie or hypomania.    History from 5/ 15/20 visit:  \"The patient says that her mood has been in the middle, averaging a 5 out of 10 with 10 being a great mood, she has been more irritable over the last month.  She notices that she lacks motivation and is not as " "interested in things as she usually is and this is been going on for about a month.  For example she normally lives make up and she is not interested in wearing it or using it.  She knows she ought to go to the gym and workout or workout in general and she tells herself \"if I do not have to, I do not want to.\"  She says her anxiety has been pretty well controlled.  She does not let things bother her as much as she used to.  For example running late used to really upset her and now she is able to let it roll off of her back.  Regarding her insomnia some days she is able to sleep okay and then other days she has a hard time falling asleep and then wakes up during the night.  She drinks 1 cup of coffee at least in the morning.  Educated her about caffeine and that can be in her system for up to 70 hours.\"    Psychiatric History:  Denies any hospitalizations  Denies any history of SI, SA or self harm  Dr. Cerna beginning in May 2019.  She was referred by her primary care physician.  Her last appointment was August 16, 2019.  She saw Dr. DOCKERY approximately 3 times  Medication trials: She is currently taking Zoloft 50 mg and previously tried citalopram and buspirone.  She also has a prescription for Ambien.    Family History:  Brother with anxiety, she denies any family history of bipolar disorder, schizophrenia or suicide attempts.    Social History:  The patient is from [place], she is the oldest of 4 children, her youngest sibling is 12 years old.  Her mother was a single mom and said the patient was always responsible for taking care of her siblings.  She is very protective of her younger siblings.  She says school is okay for her that she did get bullied in elementary school for her weight but she never felt vulnerable but more defensive about this.  She said it got better in middle school.  In high school she was very involved in her schoolwork but she did not like sports.  She works at a [employer] for a while and " then she got pregnant with her daughter.  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    Depression screening:  Depression Screen (PHQ-2/PHQ-9) 6/21/2019 8/16/2019 8/7/2020   PHQ-2 Total Score - - 1   PHQ-2 Total Score - - -   PHQ-2 Total Score 4 3 -   PHQ-9 Total Score - - 2   PHQ-9 Total Score 10 12 -       Past Medical, Family and Social History reviewed with the patient.    Current medications and allergies reviewed with the patient.    REVIEW OF SYSTEMS:        Constitutional Positive - obesity   Eyes negative   Ears/Nose/Mouth/Throat negative   Cardiovascular negative   Respiratory Positive - GUERA   Gastrointestinal negative   Genitourinary negative   Muscular negative   Integumentary negative   Neurological negative   Endocrine negative   Hematologic/Lymphatic negative       Physical Examination and Psychiatric Mental Status Examination:  Vital signs: LMP 03/10/2021     CONSTITUTIONAL:  General Appearance:  Clean, casual attire, good eye contact, engaged with provider    MUSCULOSKELETAL:  Muscle Strength and Tone:  no atrophy apparent, no abnormal movements apparent  Gait and Station:  Not able to assess.    ORIENTATION:  Oriented to time, place and person  RECENT AND REMOTE MEMORY:  Grossly intact  ATTENTION SPAN AND CONCENTRATION:  within normal range  LANGUAGE:  no deficits appreciated  FUND OF KNOWLEDGE:  has awareness of current events, past history and normal vocabulary  SPEECH:  normal volume, amount, rate and articulation, no perseveration or paucity of language  MOOD:  Depressed and Anxious  AFFECT:  Mood congruent, tearful  THOUGHT PROCESS:  logical and goal directed  THOUGHT CONTENT:  Denies any SI/HI or AVH, no delusional thinking nor preoccupations appreciated  ASSOCIATIONS:   Intact, not loose, no tangentiality or circumstantiality  MEMORY:  No gross evidence of memory deficits  JUDGMENT:  adequate concerning everyday activities  INSIGHT:  adequate to psychiatric condition        DIAGNOSTIC IMPRESSION:  1. Severe episode of recurrent major depressive disorder, without psychotic features (HCC)  - REFERRAL FOR INDIVIDUAL THERAPY    2. PATIENCE (generalized anxiety disorder)  - REFERRAL FOR INDIVIDUAL THERAPY    Other orders  - sertraline (ZOLOFT) 100 MG Tab; Take 1 tablet by mouth every day. Do this for 14 days, then begin taking 1.5 tablets by mouth once a day.  Dispense: 135 tablet; Refill: 0  - buPROPion (WELLBUTRIN XL) 300 MG XL tablet; Take 1 tablet by mouth every morning.  Dispense: 90 tablet; Refill: 0  - buPROPion (WELLBUTRIN XL) 150 MG XL tablet; Take 1 tablet by mouth every morning. Combine with 300 mg for a total of 450 mg every morning  Dispense: 90 tablet; Refill: 0     Asssessment and Plan:  1. Major depression, recurrent, severe - worsening  2. PATIENCE, worsening  Increase Sertraline from 50 mg to 100 mg once a day x 14 days, then , has supply from her PCP Dr. Sanchez  Increase Wellbutrin XL from 300 mg to 450 mg  Placed referral for individual therapy for MDD, PATIENCE, and help with being assertive  Begin a daily intentional breathing practice to reduce anxiety      3.  Obesity  Previously placed referral previously placed to [facility] health improvement program for dietitian evaluation    4. Insomnia, worsening  Monitor caffeine intake and effect on insomnia  D/C melatonin 5 mg 1/2 to 1 up to 3 hours before bedtime, didn't help with middle insomnia   Restart Trazodone 50 mg, groggy next day but got a good night's rest  Begin Hydroxyzine Pamoate 25 mg 1 to 3 PRN insomnia or anxiety    Follow up in 6 weeks or call sooner PRN    Risks, benefits, alternatives and side effects were discussed for all medicines prescribed at this visit.  The patient voiced understanding.  The patient agrees to  call the clinic with any questions or concerns, or seek emergent medical care if warranted.    The patient has a safety plan that includes calling the 1-800 crisis line number, calling 911 and/or going to the nearest Emergency Department if symptoms worsen.    The proposed treatment plan was discussed with the patient who was provided the opportunity to ask questions and make suggestions regarding alternative treatment. The patient verbalized understanding and expressed agreement with the plan.    Greater than 16 minutes of the visit was spent in psychotherapy.     Psychotherapy include:  Supportive psychotherapy as the patient talked about her disappointment with work, her distrust or uneasiness with her supervisor after her supervisor got angry with her, her difficulty controlling her worry and negative thinking.  Validated the patient's feelings.  Psychoeducation about breathing techniques to reduce anxiety and reduce sympathetic tone and increase parasympathetic tone, that when we are anxious, we breath more shallowly and this perpetuates our body operating in its sympathetic nervous system and deep breathing moves the body into the parasympathetic nervous system by stimulating the John Nerve.: Instructions given: Do 5 deep inhales/exhales, 5 count each, hold each for count of 2, do this at least 5 time daily and do this at any time feeling anxious.      Frances Forbes M.D.    This note was created using voice recognition software (Dragon). The accuracy of the dictation is limited by the abilities of the software. I have reviewed the note prior to signing, however some errors in grammar and context are still possible. If you have any questions related to this note please do not hesitate to contact our office.

## 2021-03-19 NOTE — PROGRESS NOTES
Telemedicine Video Visit: Established Patient   This Remote Face to Face encounter was conducted via Zoom. Given the importance of social distancing and other strategies recommended to reduce the risk of COVID-19 transmission, I am providing medical care to this patient via audio/video visit in place of an in person visit at the request of the patient. Verbal consent to telehealth, risks, benefits, and consequences were discussed. Patient retains the right to withdraw at any time. All existing confidentiality protections apply. The patient has access to all transmitted medical information. No dissemination of any patient images or information to other entities without further written consent.  Subjective:     Chief Complaint   Patient presents with   • Establish Care     prior pcp marilyn Moss Myranda Padgett is a 25 y.o. female presenting for evaluation and management of:    Previous PCP: Dr. Sanchez    Patient presents today to request lab work.  She has some concerns that she has been noticing that when she is called her fingernails turn purple.  States she has had this for quite some time.  She also states that her mother has lupus and rheumatoid arthritis.  She does complain of having dry eyes as well as occasional rashes on her neck.  Denies any overt joint pain at this time.    Patient does have depression and anxiety.  States she does follow-up with psychiatry for this.  Currently taking sertraline and bupropion.  Follow up with PSychiartyr for this.       ROS cold extremities  Denies any recent fevers or chills. No nausea or vomiting. No chest pains or shortness of breath.     No Known Allergies    Current medicines (including changes today)  Current Outpatient Medications   Medication Sig Dispense Refill   • tretinoin (RETIN-A) 0.025 % cream AAA face/shoulders QHS for acne 45 g 3   • hydrOXYzine pamoate (VISTARIL) 25 MG Cap Take 1 to 3 tablets by mouth once a day as need for insomnia or anxiety  90 Cap 3   • omeprazole (PRILOSEC) 40 MG delayed-release capsule Take 1 Cap by mouth every day. 90 Cap 3   • albuterol 108 (90 Base) MCG/ACT Aero Soln inhalation aerosol Inhale 2 Puffs by mouth every 6 hours as needed for Shortness of Breath. 8.5 g 11   • sertraline (ZOLOFT) 100 MG Tab Take 1 tablet by mouth every day. Do this for 14 days, then begin taking 1.5 tablets by mouth once a day. 135 tablet 0   • buPROPion (WELLBUTRIN XL) 300 MG XL tablet Take 1 tablet by mouth every morning. 90 tablet 0   • buPROPion (WELLBUTRIN XL) 150 MG XL tablet Take 1 tablet by mouth every morning. Combine with 300 mg for a total of 450 mg every morning 90 tablet 0   • clindamycin (CLEOCIN) 1 % Solution AAA face/shoulders Qday-BID after benzoyl peroxide wash for acne 60 mL 3     No current facility-administered medications for this visit.       Patient Active Problem List    Diagnosis Date Noted   • Severe episode of recurrent major depressive disorder, without psychotic features (Formerly [facility]) 03/19/2021   • BMI 29.0-29.9,adult 10/15/2020   • GUERA (obstructive sleep apnea) 05/29/2018   • Insomnia 03/30/2018   • Anxiety 01/23/2018   • Obesity (BMI 30.0-34.9) 08/01/2017   • Gastroesophageal reflux disease with esophagitis 03/21/2017   • Dysthymia 11/08/2016       Family History   Problem Relation Age of Onset   • Cancer Maternal Aunt         breast cancer    • Hyperlipidemia Maternal Grandfather    • Diabetes Maternal Grandfather         diet control   • Hypertension Maternal Grandfather    • Heart Disease Maternal Grandfather    • Lupus Mother    • Rheumatologic Disease Mother    • Hypertension Father    • Diabetes Maternal Uncle    • Lung Disease Paternal Aunt         asthma       She  has a past medical history of Anemia (2013), Anxiety (1/23/2018), Depression, Dysthymia (11/8/2016), Infectious mononucleosis, and Nausea and vomiting in pregnancy (2/20/2013). She also has no past medical history of Addisons disease (Formerly [facility]), Adrenal disorder  "(HCC), Allergy, ASTHMA, Blood transfusion, CATARACT, CHF (congestive heart failure) (HCC), Clotting disorder (HCC), COPD, Cushings syndrome (HCC), Diabetes, Diabetic neuropathy (HCC), EMPHYSEMA, Goiter, Headache(784.0), Heart attack (HCC), HIV (human immunodeficiency virus infection), Hyperlipidemia, IBD (inflammatory bowel disease), Kidney disease, Meningitis, Muscle disorder, OSTEOPOROSIS, Parathyroid disorder (HCC), Pituitary disease (HCC), Sickle cell disease (HCC), Substance abuse (HCC), Thyroid disease, Ulcer, or Urinary tract infection, site not specified.  She  has a past surgical history that includes finger orif (6/24/2014) and percutaneous pinning (6/24/2014).       Objective:   Vitals obtained by patient:  Ht 1.486 m (4' 10.5\")   Wt 66.2 kg (146 lb)   LMP 03/10/2021   BMI 29.99 kg/m²     Physical Exam:  Constitutional: Alert, no distress, well-groomed.  Skin: No rashes in visible areas.  Eye: Round. Conjunctiva clear, lids normal. No icterus.   ENMT: Lips pink without lesions, good dentition, moist mucous membranes. Phonation normal.  Neck: No masses, no thyromegaly. Moves freely without pain.  CV: Pulse as reported by patient  Respiratory: Unlabored respiratory effort, no cough or audible wheeze  Psych: Alert and oriented x3, normal affect and mood.       Assessment and Plan:   The following treatment plan was discussed:     1. Cold extremities  New problem to me.  Have discussed Raynaud's phenomenon.  Follow-up labs.  - CCP ANTIBODY; Future  - RHEUMATOID ARTHRITIS FACTOR; Future  - THYROID PEROXIDASE  (TPO) AB; Future  - TSH WITH REFLEX TO FT4; Future  - LUPUS COMPREHENSIVE PANEL; Future  - [place] REFLEXIVE PROFILE; Future    2. Skin change  - LUPUS COMPREHENSIVE PANEL; Future  - [place] REFLEXIVE PROFILE; Future    3. Dry eyes  - LUPUS COMPREHENSIVE PANEL; Future  - NAT[facility] REFLEXIVE PROFILE; Future    4. Encounter to establish care        Follow-up: No follow-ups on file.    Face to Face Video Visit: "     ELISSA Leigh.

## 2021-03-26 ENCOUNTER — HOSPITAL ENCOUNTER (OUTPATIENT)
Dept: LAB | Facility: MEDICAL CENTER | Age: 26
End: 2021-03-26
Attending: NURSE PRACTITIONER
Payer: COMMERCIAL

## 2021-03-26 DIAGNOSIS — R23.9 SKIN CHANGE: ICD-10-CM

## 2021-03-26 DIAGNOSIS — R20.9 COLD EXTREMITIES: ICD-10-CM

## 2021-03-26 DIAGNOSIS — H04.123 DRY EYES: ICD-10-CM

## 2021-03-26 PROCEDURE — 86200 CCP ANTIBODY: CPT

## 2021-03-26 PROCEDURE — 86376 MICROSOMAL ANTIBODY EACH: CPT

## 2021-03-26 PROCEDURE — 86235 NUCLEAR ANTIGEN ANTIBODY: CPT | Mod: 91

## 2021-03-26 PROCEDURE — 86038 ANTINUCLEAR ANTIBODIES: CPT

## 2021-03-26 PROCEDURE — 84443 ASSAY THYROID STIM HORMONE: CPT

## 2021-03-26 PROCEDURE — 86431 RHEUMATOID FACTOR QUANT: CPT

## 2021-03-26 PROCEDURE — 86225 DNA ANTIBODY NATIVE: CPT

## 2021-03-26 PROCEDURE — 86160 COMPLEMENT ANTIGEN: CPT

## 2021-03-26 PROCEDURE — 36415 COLL VENOUS BLD VENIPUNCTURE: CPT

## 2021-03-26 PROCEDURE — 86039 ANTINUCLEAR ANTIBODIES (ANA): CPT

## 2021-03-27 LAB
RHEUMATOID FACT SER IA-ACNC: <10 IU/ML (ref 0–14)
THYROPEROXIDASE AB SERPL-ACNC: 10 IU/ML (ref 0–9)
TSH SERPL DL<=0.005 MIU/L-ACNC: 1.32 UIU/ML (ref 0.38–5.33)

## 2021-03-28 LAB — CCP IGG SERPL-ACNC: 5 UNITS (ref 0–19)

## 2021-03-29 LAB
ANA INTERPRETIVE COMMENT Q5143: ABNORMAL
ANA PATTERN Q5144: ABNORMAL
ANA TITER Q5145: ABNORMAL
ANTINUCLEAR ANTIBODY (ANA), HEP-2, IGG Q5142: DETECTED
C3 SERPL-MCNC: 92 MG/DL (ref 88–201)
C4 SERPL-MCNC: 13 MG/DL (ref 10–40)
NUCLEAR IGG SER QL IA: DETECTED
RHEUMATOID FACT SER NEPH-ACNC: <10 IU/ML (ref 0–14)

## 2021-03-30 LAB
DSDNA AB TITR SER CLIF: NORMAL {TITER}
ENA SCL70 IGG SER QL: 2 AU/ML (ref 0–40)
ENA SM IGG SER-ACNC: 1 AU/ML (ref 0–40)
ENA SS-B IGG SER IA-ACNC: 0 AU/ML (ref 0–40)
SSA52 R0ENA AB IGG Q0420: 5 AU/ML (ref 0–40)
SSA60 R0ENA AB IGG Q0419: 0 AU/ML (ref 0–40)
THYROPEROXIDASE AB SERPL-ACNC: <0.3 IU/ML (ref 0–9)

## 2021-04-01 LAB — U1 SNRNP IGG SER QL: NORMAL

## 2021-04-08 DIAGNOSIS — Z11.3 SCREEN FOR STD (SEXUALLY TRANSMITTED DISEASE): ICD-10-CM

## 2021-04-16 ENCOUNTER — TELEMEDICINE (OUTPATIENT)
Dept: BEHAVIORAL HEALTH | Facility: CLINIC | Age: 26
End: 2021-04-16
Payer: COMMERCIAL

## 2021-04-16 DIAGNOSIS — F34.1 DYSTHYMIA: ICD-10-CM

## 2021-04-16 DIAGNOSIS — F41.9 ANXIETY: ICD-10-CM

## 2021-04-16 PROCEDURE — 90791 PSYCH DIAGNOSTIC EVALUATION: CPT | Performed by: MARRIAGE & FAMILY THERAPIST

## 2021-04-16 NOTE — BH THERAPY
[facility]  INITIAL ASSESSMENT    This evaluation was conducted via Zoom using secure and encrypted videoconferencing technology. The patient was in a private location in the [facility].    The patient's identity was confirmed and verbal consent was obtained for this virtual visit.     Name: Isa Padgett  MRN: [id number]  : 1995  Age: 25 y.o.  Date of assessment: 2021  PCP: BONI Liegh  Persons in attendance: Patient  Total session time: 45minutes    Patient's identity and location was verified.  Therapist reviewed limits of confidentiality. Therapist verbally reviewed informed consent for therapy due to session being conducted virtually. Therapist discussed risks/benefits and expectations for services. Patient provided verbal consent for psychotherapy services.     CHIEF COMPLAINT AND HISTORY OF PRESENTING PROBLEM:  (as stated by Patient):  Isa Padgett is a 25 y.o., Other female referred for assessment by No ref. provider found.  Primary presenting issue includes No chief complaint on file.  Increased irritability, anxiety, anger, taking things personally Increasing over the past two weeks +    FAMILY/SOCIAL HISTORY  Current living situation/household members: Lives alone with daughter.   The patient is from [place], she is the oldest of 4 children, her youngest sibling is 12 years old.  Her mother was a single mom and said the patient was always responsible for taking care of her siblings.  She is very protective of her younger siblings.  She says school is okay for her that she did get bullied in elementary school for her weight but she never felt vulnerable but more defensive about this.  She said it got better in middle school.  In high school she was very involved in her schoolwork but she did not like sports. She worked at a [employer] for a while until she got pregnant with her daughter  Quality/quantity of current family and/or  social support: Best friend  Does patient/parent report a family history of behavioral health issues, diagnoses, or treatment? Grandfather passed away from [facility].   Family History   Problem Relation Age of Onset   • Cancer Maternal Aunt         breast cancer    • Hyperlipidemia Maternal Grandfather    • Diabetes Maternal Grandfather         diet control   • Hypertension Maternal Grandfather    • Heart Disease Maternal Grandfather    • Lupus Mother    • Rheumatologic Disease Mother    • Hypertension Father    • Diabetes Maternal Uncle    • Lung Disease Paternal Aunt         asthma        BEHAVIORAL HEALTH TREATMENT HISTORY  Does patient/parent report a history of prior behavioral health treatment for patient? No:    History of untreated behavioral health issues identified? No    MEDICAL HISTORY    Primary care behavioral health screenings: Patient Health Questionaire                                     If depressive symptoms identified deferred to follow up visit unless specifically addressed in assesment and plan.    Interpretation of PHQ-9 Total Score   Score Severity   1-4 No Depression   5-9 Mild Depression   10-14 Moderate Depression   15-19 Moderately Severe Depression   20-27 Severe Depression         Medical history provided by patient during current evaluation: No additional information provided    Depression Screen (PHQ-2/PHQ-9) 6/21/2019 8/16/2019 8/7/2020   PHQ-2 Total Score - - 1   PHQ-2 Total Score - - -   PHQ-2 Total Score 4 3 -   PHQ-9 Total Score - - 2   PHQ-9 Total Score 10 12 -         Past medical/surgical history:   Past Medical History:   Diagnosis Date   • Anemia 2013   • Anxiety 1/23/2018   • Depression    • Dysthymia 11/8/2016   • Infectious mononucleosis    • Nausea and vomiting in pregnancy 2/20/2013      Past Surgical History:   Procedure Laterality Date   • FINGER ORIF  6/24/2014    Performed by Ed [facility]itia M.D. at [facility]   • PERCUTANEOUS PINNING  6/24/2014     Performed by Ed Espitia M.D. at SURGERY [facility]        Medication Allergies:  Patient has no known allergies.     Does patient/parent report any history of or current developmental concerns? ESL  Does patient/parent report nutritional concerns? No  Does patient/parent report change in appetite or weight loss/gain? No  Does patient/parent report history of eating disorder symptoms? No  Does patient/parent report physical pain? Yes  Indicate if pain is acute or chronic, and location: Recent slight dislocation of knee is healing     Pain scale rating:       Does patient/parent report functional impact of medical, developmental, or pain issues?   no       EDUCATIONAL/LEARNING HISTORY  Is patient currently enrolled in a school/educational program?   No:   Highest grade level completed: Graduated HS, some MA school.  School performance/functioning: OK  History of Special Education/repeated grades/learning issues: no  Preferred learning style: Visual and hands on  Current learning needs (large print, language barrier, etc):  n/a    EMPLOYMENT/RESOURCES  Is the patient currently employed? 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 Money is tight and a worry but improving.  Does patient identify impact of presenting issue on work functioning? Yes  Work or income-related stressors:  Becoming irritable and frustrated at work.     HISTORY:  Does patient report current or past enlistment? No    [If yes, complete below items]  Does patient report history of exposure to combat? No  Does patient report history of  sexual trauma? No  Does patient report other -related stressors? No    SPIRITUAL/CULTURAL/IDENTITY:  What are the patient’s/family’s spiritual beliefs or practices? No  What is the patient’s cultural or ethnic background/identity?   How does the patient identify their sexual  orientation? Heterosexual  How does the patient identify their gender? Female  Does the patient identify any spiritual/cultural/identity factors as relevant to the presenting issue? No    LEGAL HISTORY  Has the patient ever been involved with juvenile, adult, or family legal systems? Child custody over a year ago.   [If yes, trigger section below:]  Does patient report ever being a victim of a crime?  No  Does patient report involvement in any current legal issues?  No  Does patient report ever being arrested or committing a crime? No  Does patient report any current agency (parole/probation/CPS/) involvement? No    ABUSE/NEGLECT/TRAUMA SCREENING  Does patient report feeling “unsafe” in his/her home, or afraid of anyone? No  Does patient report any history of physical, sexual, or emotional abuse? No  Does the patient report any history of CPS/APS/police involvement related to suspected abuse/neglect or domestic violence? No  Does the patient report any other history of potentially traumatic life events? No  Based on the information provided during the current assessment, is a mandated report of suspected abuse/neglect being made?  No     SAFETY ASSESSMENT - SELF  Does patient acknowledge current or past symptoms of dangerousness to self? No      Recent change in frequency/specificity/intensity of suicidal thoughts or self-harm behavior? No  Current access to firearms, medications, or other identified means of suicide/self-harm? No  If yes, willing to restrict access to means of suicide/self-harm? Yes  Protective factors present: N/A    Current Suicide Risk: Not applicable  Crisis Safety Plan completed and copy given to patient: No    SAFETY ASSESSMENT - OTHERS  Does patient report past symptoms of aggressive behavior or risk to others? No    Recent change in frequency/specificity/intensity of thoughts or threats to harm others? No  Current access to firearms/other identified means of harm? No  If yes,  willing to restrict access to weapons/means of harm? Yes  Protective factors present: N/A    Current Homicide Risk:  Not applicable  Crisis Safety Plan completed and copy given to patient? No  Based on information provided during the current assessment, is a mandated “duty to warn” being exercised? No    SUBSTANCE USE/ADDICTION HISTORY    Is there a family history of substance use/addiction? Father last seen when patient was 4 years old. Alcoholic. Mothers siblings have alcohol and substance issues.  Does patient acknowledge or report use of/dependence on substances? No  Last time patient used alcohol: 3 weeks ago  Within the past week? No  Last time patient used marijuana: N/A  Within the past month? Yes  Any other street drugs ever tried even once? No  Any use of prescription medications/pills without a prescription, or for reasons others than originally prescribed?  No  Any other addictive behavior reported (gambling, shopping, sex)? No     Amphetamine:      Cannibis:      Cocaine:      Ecstasy:      Hallucinogen:      Inhalant:       Opiate:      Other:      Sedative:       What consequences does the patient associate with any of the above substance use and or addictive behaviors? None      [] Patient denies use of any substance/addictive behavior.    Patient’s motivation and readiness for change: Good    Patient’s expectations in psychotherapy: Reduce anxiety, coping skills.     STRENGTHS/ASSETS  Strengths Identified by interviewer: Social support and Social skills  Strengths Identified by patient: Good time management, reliable, likes to get work done.     HOBBIES/INTERESTS: Hiking, shopping    MENTAL STATUS/OBSERVATIONS   Participation: Active verbal participation, Attentive and Engaged  Grooming: Casual  Orientation:Alert and Fully Oriented   Behavior: Calm  Eye contact: Good   Mood:Euthymic and Anxious  Affect:Flexible, Full range and Congruent with content  Thought process: Logical and  Goal-directed  Thought content:  Within normal limits  Speech: Rate within normal limits and Volume within normal limits  Perception: Within normal limits  Memory: No gross evidence of memory deficits  Insight: Adequate  Judgment:  Adequate  Other:       CLINICAL FORMULATION: Isa Padgett is a 24 y.o. female with a history of major depressive disorder, generalized anxiety disorder, and insomnia, with comorbid medical problems including GERD, obstructive sleep apnea and obesity, presents today via Zoom video for follow up and to establish care with a new psychiatrist, as the patient's former psychiatrist, Dr. Cerna, (5/2019 - 8/2019) retired. Current Frances Forbes M.D. since 5/2020  Patient discussed being bullied as a child due to her small stature and weight.  She feels are recently people have been angry and appear to take it out on her by being bigger than her.  It appears that her childhood trauma is an underlying reason for current anxiety.      DIAGNOSTIC IMPRESSION(S):  1. Anxiety    2. Dysthymia          IDENTIFIED NEEDS/PLAN:  [If any of these marked, trigger DISPOSITION list]  Mood/anxiety  Actively being addressed by [facility]      Does patient express agreement with the above plan? Yes     Referral appointment(s) scheduled? Yes Patient given instructions on how to schedule further appointments. Please call [phone] to reschedule.      Pedro Pablo Ferraro, M.F.T.    This dictation has been created using voice recognition software and/or scribes. The accuracy of the dictation is limited by the abilities of the software and the expertise of the scribes. I expect there may be some errors of grammar and possibly content. I made every attempt to manually correct the errors within my dictation. However, errors related to voice recognition software and/or scribes may still exist and should be interpreted within the appropriate context.

## 2021-04-23 ENCOUNTER — HOSPITAL ENCOUNTER (OUTPATIENT)
Dept: LAB | Facility: MEDICAL CENTER | Age: 26
End: 2021-04-23
Attending: NURSE PRACTITIONER
Payer: COMMERCIAL

## 2021-04-23 DIAGNOSIS — Z11.3 SCREEN FOR STD (SEXUALLY TRANSMITTED DISEASE): ICD-10-CM

## 2021-04-23 PROCEDURE — 36415 COLL VENOUS BLD VENIPUNCTURE: CPT

## 2021-04-23 PROCEDURE — 86803 HEPATITIS C AB TEST: CPT

## 2021-04-23 PROCEDURE — 86780 TREPONEMA PALLIDUM: CPT

## 2021-04-23 PROCEDURE — 87389 HIV-1 AG W/HIV-1&-2 AB AG IA: CPT

## 2021-04-24 LAB
HCV AB SER QL: NORMAL
HIV 1+2 AB+HIV1 P24 AG SERPL QL IA: NORMAL
TREPONEMA PALLIDUM IGG+IGM AB [PRESENCE] IN SERUM OR PLASMA BY IMMUNOASSAY: NORMAL

## 2021-04-30 ENCOUNTER — APPOINTMENT (OUTPATIENT)
Dept: PHYSICAL THERAPY | Facility: REHABILITATION | Age: 26
End: 2021-04-30
Attending: FAMILY MEDICINE
Payer: COMMERCIAL

## 2021-04-30 NOTE — OP THERAPY EVALUATION
Outpatient Physical Therapy  INITIAL EVALUATION    [facility] [address], [address]  [place] [address]  Phone:  [phone]  Fax:  [phone]    Date of Evaluation: 04/30/2021    Patient: Isa Padgett  YOB: 1995  MRN: [id number]     Referring Provider: Tommie Casas M.D.  [address] [place] [address] [place] [address]   Referring Diagnosis Patellar subluxation, left, initial encounter [S83.002A]     Time Calculation                   Chief Complaint: No chief complaint on file.    Visit Diagnoses     ICD-10-CM   1. Patellar subluxation, left, initial encounter  S83.002A       No data found  Subjective:   History of Present Illness:     Mechanism of injury:  Pt with L knee pain after twisting knee getting into bed 2/10/21.   Diagnostic Tests:     Diagnostic Tests Comments:  L knee x-ray: Negative       Past Medical History:   Diagnosis Date   • Anemia 2013   • Anxiety 1/23/2018   • Depression    • Dysthymia 11/8/2016   • Infectious mononucleosis    • Nausea and vomiting in pregnancy 2/20/2013     Past Surgical History:   Procedure Laterality Date   • FINGER ORIF  6/24/2014    Performed by Ed Espitia M.D. at SURGERY [facility]   • PERCUTANEOUS PINNING  6/24/2014    Performed by Ed [facility]itia M.D. at [facility]     Social History     Tobacco Use   • Smoking status: Former Smoker     Packs/day: 0.05     Years: 2.00     Pack years: 0.10     Types: Cigarettes     Quit date: 2/20/2011     Years since quitting: 10.1   • Smokeless tobacco: Never Used   Substance Use Topics   • Alcohol use: No     Family and Occupational History     Socioeconomic History   • Marital status: Single     Spouse name: Not on file   • Number of children: Not on file   • Years of education: Not on file   • Highest education level: Associate degree: academic program   Occupational History   • Not on file       Objective      Therapeutic Exercises (CPT  67987):     17. UPOC 6/25/21      Time-based treatments/modalities:           Assessment, Response and Plan:     Plan:   Frequency:  2x week  Duration in weeks:  8  Duration in visits:  16  Discussed with:  Patient      Functional Assessment Used        Referring provider co-signature:  I have reviewed this plan of care and my co-signature certifies the need for services.    Certification Period: 04/30/2021 to  06/25/21    Physician Signature: ________________________________ Date: ______________

## 2021-05-03 ENCOUNTER — OFFICE VISIT (OUTPATIENT)
Dept: DERMATOLOGY | Facility: IMAGING CENTER | Age: 26
End: 2021-05-03
Payer: COMMERCIAL

## 2021-05-03 DIAGNOSIS — R21 RASH AND NONSPECIFIC SKIN ERUPTION: ICD-10-CM

## 2021-05-03 PROCEDURE — 99214 OFFICE O/P EST MOD 30 MIN: CPT | Performed by: NURSE PRACTITIONER

## 2021-05-03 PROCEDURE — RXMED WILLOW AMBULATORY MEDICATION CHARGE: Performed by: NURSE PRACTITIONER

## 2021-05-03 RX ORDER — PIMECROLIMUS 10 MG/G
1 CREAM TOPICAL 2 TIMES DAILY
Qty: 30 G | Refills: 1 | Status: SHIPPED | OUTPATIENT
Start: 2021-05-03 | End: 2021-06-18 | Stop reason: SINTOL

## 2021-05-03 RX ORDER — TRIAMCINOLONE ACETONIDE 1 MG/G
1 OINTMENT TOPICAL 2 TIMES DAILY
Qty: 45 G | Refills: 1 | Status: SHIPPED | OUTPATIENT
Start: 2021-05-03 | End: 2023-10-12 | Stop reason: SDUPTHER

## 2021-05-03 NOTE — PROGRESS NOTES
DERMATOLOGY NOTE  FOLLOW UP VISIT       Chief complaint: Rash     HPI:Rash around neck and eyelids  Onset: 1 month  Symptoms: itchy, dry and redness  Aggravating factors: No  Alleviating factors: No  New creams/topicals: No  New medications (up to last 6 months): No  New travel: No  Other exposures: No  Treatments: No  No difficulty breathing.  No difficulty swallowing.  No swelling of eyes or lips      No Known Allergies     MEDICATIONS:  Medications relevant to specialty reviewed.     REVIEW OF SYSTEMS:   Positive for skin (see HPI)  Negative for fevers and chills       EXAM:  There were no vitals taken for this visit.  Constitutional: Well-developed, well-nourished, and in no distress.     A focused skin exam was performed including the affected areas of the head (including face) and neck. Notable findings on exam today listed below and/or in assessment/plan.     with ill defined pink scaly papules and plaques with excoriations around anterior neck  Slight erythema noted periorbital area.    IMPRESSION / PLAN:    1. Rash and nonspecific skin eruption  Uncertain etiology.  Possible allergic contact.  Start Elidel twice daily to area around her eyes.  Advised not to use steroid on face.  Side effects discussed  Start triamcinolone ointment twice daily to area at neck.  Provided handout for sensitive skin.  And reviewed with patient.  Recheck in 6 to 8 weeks.  Consider allergy testing.  - pimecrolimus (ELIDEL) 1 % cream; Apply 1 Application topically 2 times a day. face  Dispense: 30 g; Refill: 1  - triamcinolone acetonide (KENALOG) 0.1 % Ointment; Apply 1 Application topically 2 times a day. body  Dispense: 45 g; Refill: 1       Please note that this dictation was created using voice recognition software. I have made every reasonable attempt to correct obvious errors, but I expect that there are errors of grammar and possibly content that I did not discover before finalizing the note.      Return to clinic in:  Return for 6-8 weeks rash re-check. and as needed for any new or changing skin lesions.

## 2021-05-04 ENCOUNTER — PHARMACY VISIT (OUTPATIENT)
Dept: PHARMACY | Facility: MEDICAL CENTER | Age: 26
End: 2021-05-04
Payer: COMMERCIAL

## 2021-05-07 ENCOUNTER — APPOINTMENT (OUTPATIENT)
Dept: PHYSICAL THERAPY | Facility: REHABILITATION | Age: 26
End: 2021-05-07
Attending: FAMILY MEDICINE
Payer: COMMERCIAL

## 2021-05-12 ENCOUNTER — HOSPITAL ENCOUNTER (OUTPATIENT)
Dept: LAB | Facility: MEDICAL CENTER | Age: 26
End: 2021-05-12
Attending: EMERGENCY MEDICINE
Payer: COMMERCIAL

## 2021-05-12 LAB
COVID ORDER STATUS COVID19: NORMAL
SARS-COV-2 RNA RESP QL NAA+PROBE: NOTDETECTED
SPECIMEN SOURCE: NORMAL

## 2021-05-14 ENCOUNTER — TELEMEDICINE (OUTPATIENT)
Dept: MEDICAL GROUP | Facility: MEDICAL CENTER | Age: 26
End: 2021-05-14
Payer: COMMERCIAL

## 2021-05-14 ENCOUNTER — APPOINTMENT (OUTPATIENT)
Dept: PHYSICAL THERAPY | Facility: REHABILITATION | Age: 26
End: 2021-05-14
Attending: FAMILY MEDICINE
Payer: COMMERCIAL

## 2021-05-14 VITALS — HEIGHT: 59 IN | BODY MASS INDEX: 30.24 KG/M2 | WEIGHT: 150 LBS

## 2021-05-14 DIAGNOSIS — R76.8 ANTI-TPO ANTIBODIES PRESENT: ICD-10-CM

## 2021-05-14 DIAGNOSIS — R76.8 POSITIVE ANA (ANTINUCLEAR ANTIBODY): ICD-10-CM

## 2021-05-14 DIAGNOSIS — R68.89 COLD INTOLERANCE: ICD-10-CM

## 2021-05-14 PROCEDURE — 99213 OFFICE O/P EST LOW 20 MIN: CPT | Mod: 95 | Performed by: NURSE PRACTITIONER

## 2021-05-14 NOTE — PROGRESS NOTES
Telemedicine Video Visit: Established Patient   This Remote Face to Face encounter was conducted via Zoom. Given the importance of social distancing and other strategies recommended to reduce the risk of COVID-19 transmission, I am providing medical care to this patient via audio/video visit in place of an in person visit at the request of the patient. Verbal consent to telehealth, risks, benefits, and consequences were discussed. Patient retains the right to withdraw at any time. All existing confidentiality protections apply. The patient has access to all transmitted medical information. No dissemination of any patient images or information to other entities without further written consent.  Subjective:     Chief Complaint   Patient presents with   • Lab Results     DOS: 3/26/2021       Isa Padgett is a 25 y.o. female presenting for evaluation and management of:      Here to review her most recent labs.    Continues to have cold intolerance.  States that the cold air causes itching.  There are no reports of hives.      Positive TPO antibodies/positive NATIVIDAD.  Found on most recent labs.  Have reviewed this with the patient.  TSH 1.320.     Ref. Range 3/26/2021 12:36   Anti-Dna -Ds Latest Ref Range: None Detected  None Detected   Microsomal -Tpo- Abs Latest Ref Range: 0.0 - 9.0 IU/mL 10.0 (H)   Anti-Scl-70 Latest Ref Range: 0 - 40 AU/mL 2   Ccp Antibodies Latest Ref Range: 0 - 19 Units 5   NATIVIDAD Interpretive Comment Unknown See Note   NATIVIDAD Pattern Unknown Homogeneous (A)   Antinuclear Antibody Latest Ref Range: None Detected  Detected (A)   Smith Antibodies Latest Ref Range: 0 - 40 AU/mL 1   SSA 52 (R0)(TARIK) Ab, IgG Latest Ref Range: 0 - 40 AU/mL 5   SSA 60 (R0)(TARIK) Ab, IgG Latest Ref Range: 0 - 40 AU/mL 0   Sjogren'S Anti-Ss-B Latest Ref Range: 0 - 40 AU/mL 0   NATIVIDAD Titer Unknown 1:320 (A)       ROS   Denies any recent fevers or chills. No nausea or vomiting. No chest pains or shortness of breath.     No  Known Allergies    Current medicines (including changes today)  Current Outpatient Medications   Medication Sig Dispense Refill   • pimecrolimus (ELIDEL) 1 % cream Apply 1 Application topically 2 times a day. face 30 g 1   • triamcinolone acetonide (KENALOG) 0.1 % Ointment Apply 1 Application topically 2 times a day. body 45 g 1   • sertraline (ZOLOFT) 100 MG Tab Take 1 tablet by mouth every day. Do this for 14 days, then begin taking 1.5 tablets by mouth once a day. 135 tablet 0   • buPROPion (WELLBUTRIN XL) 300 MG XL tablet Take 1 tablet by mouth every morning. 90 tablet 0   • clindamycin (CLEOCIN) 1 % Solution AAA face/shoulders Qday-BID after benzoyl peroxide wash for acne 60 mL 3   • tretinoin (RETIN-A) 0.025 % cream AAA face/shoulders QHS for acne 45 g 3   • hydrOXYzine pamoate (VISTARIL) 25 MG Cap Take 1 to 3 tablets by mouth once a day as need for insomnia or anxiety 90 Cap 3   • omeprazole (PRILOSEC) 40 MG delayed-release capsule Take 1 Cap by mouth every day. 90 Cap 3   • albuterol 108 (90 Base) MCG/ACT Aero Soln inhalation aerosol Inhale 2 Puffs by mouth every 6 hours as needed for Shortness of Breath. 8.5 g 11   • buPROPion (WELLBUTRIN XL) 150 MG XL tablet Take 1 tablet by mouth every morning. Combine with 300 mg for a total of 450 mg every morning (Patient not taking: Reported on 4/16/2021) 90 tablet 0     No current facility-administered medications for this visit.       Patient Active Problem List    Diagnosis Date Noted   • Cold intolerance 05/17/2021   • Positive NATIVIDAD (antinuclear antibody) 05/17/2021   • Severe episode of recurrent major depressive disorder, without psychotic features ([facility]) 03/19/2021   • BMI 29.0-29.9,adult 10/15/2020   • GUERA (obstructive sleep apnea) 05/29/2018   • Insomnia 03/30/2018   • Anxiety 01/23/2018   • Obesity (BMI 30.0-34.9) 08/01/2017   • Gastroesophageal reflux disease with esophagitis 03/21/2017   • Dysthymia 11/08/2016       Family History   Problem Relation Age of  "Onset   • Cancer Maternal Aunt         breast cancer    • Hyperlipidemia Maternal Grandfather    • Diabetes Maternal Grandfather         diet control   • Hypertension Maternal Grandfather    • Heart Disease Maternal Grandfather    • Lupus Mother    • Rheumatologic Disease Mother    • Hypertension Father    • Diabetes Maternal Uncle    • Lung Disease Paternal Aunt         asthma       She  has a past medical history of Anemia (2013), Anxiety (1/23/2018), Depression, Dysthymia (11/8/2016), Infectious mononucleosis, and Nausea and vomiting in pregnancy (2/20/2013). She also has no past medical history of Addisons disease (HCC), Adrenal disorder (HCC), Allergy, ASTHMA, Blood transfusion, CATARACT, CHF (congestive heart failure) ([facility]), Clotting disorder (HCC), COPD, Cushings syndrome ([facility]), Diabetes, Diabetic neuropathy ([facility]), EMPHYSEMA, Goiter, Headache(784.0), Heart attack (HCC), HIV (human immunodeficiency virus infection), Hyperlipidemia, IBD (inflammatory bowel disease), Kidney disease, Meningitis, Muscle disorder, OSTEOPOROSIS, Parathyroid disorder ([facility]), Pituitary disease ([facility]), Sickle cell disease ([facility]), Substance abuse ([facility]), Thyroid disease, Ulcer, or Urinary tract infection, site not specified.  She  has a past surgical history that includes finger orif (6/24/2014) and percutaneous pinning (6/24/2014).       Objective:   Vitals obtained by patient:  Ht 1.486 m (4' 10.5\")   Wt 68 kg (150 lb)   BMI 30.82 kg/m²     Physical Exam: See above  Constitutional: Alert, no distress, well-groomed.  Skin: No rashes in visible areas.  Eye: Round. Conjunctiva clear, lids normal. No icterus.   ENMT: Lips pink without lesions, good dentition, moist mucous membranes. Phonation normal.  Neck: No masses, no thyromegaly. Moves freely without pain.  CV: Pulse as reported by patient  Respiratory: Unlabored respiratory effort, no cough or audible wheeze  Psych: Alert and oriented x3, normal affect and mood.       Assessment and " Plan:   The following treatment plan was discussed:     1. Anti-TPO antibodies present  New finding on most recent labs.  Recheck TSH in 6 months.    2. Positive NATIVIDAD (antinuclear antibody)  Positive on most recent labs.  Low titer.    3. Cold intolerance  Ongoing issue for the patient.  Recheck thyroid in 6 months.  - TSH WITH REFLEX TO FT4; Future        Follow-up: No follow-ups on file.    Face to Face Video Visit:     BONI Leigh

## 2021-05-17 PROBLEM — R68.89 COLD INTOLERANCE: Status: ACTIVE | Noted: 2021-05-17

## 2021-05-17 PROBLEM — R76.8 POSITIVE ANA (ANTINUCLEAR ANTIBODY): Status: ACTIVE | Noted: 2021-05-17

## 2021-05-21 ENCOUNTER — APPOINTMENT (OUTPATIENT)
Dept: PHYSICAL THERAPY | Facility: REHABILITATION | Age: 26
End: 2021-05-21
Attending: FAMILY MEDICINE
Payer: COMMERCIAL

## 2021-05-28 ENCOUNTER — APPOINTMENT (OUTPATIENT)
Dept: PHYSICAL THERAPY | Facility: REHABILITATION | Age: 26
End: 2021-05-28
Attending: FAMILY MEDICINE
Payer: COMMERCIAL

## 2021-05-28 ENCOUNTER — TELEMEDICINE (OUTPATIENT)
Dept: BEHAVIORAL HEALTH | Facility: CLINIC | Age: 26
End: 2021-05-28
Payer: COMMERCIAL

## 2021-05-28 DIAGNOSIS — F33.2 SEVERE EPISODE OF RECURRENT MAJOR DEPRESSIVE DISORDER, WITHOUT PSYCHOTIC FEATURES (HCC): ICD-10-CM

## 2021-05-28 PROCEDURE — 90834 PSYTX W PT 45 MINUTES: CPT | Mod: 95 | Performed by: MARRIAGE & FAMILY THERAPIST

## 2021-05-28 NOTE — PROGRESS NOTES
[facility]   Therapy Progress Note      This visit was conducted via Zoom using secure and encrypted videoconferencing technology.  The patient was in a private location in the state of [place].  The patient's identity was confirmed and verbal consent was obtained for this virtual visit.      Name: Isa Padgett  MRN: [id number]  : 1995  Age: 25 y.o.  Date of assessment: 2021  PCP: BONI Leigh  Persons in attendance: Patient  Total session time: 45 minutes    Objective Observations:   Participation:Active verbal participation   Grooming:Casual   Cognition:Alert and Fully Oriented   Eye Contact:Good   Mood:Euthymic and Anxious   Affect:Flexible, Congruent with content, Anxious and Tearful   Thought Process:Logical and Goal-directed   Speech:Rate within normal limits and Volume within normal limits    Current Risk:   Suicide: low   Homicide: low   Self-Harm: low   Relapse: low   Safety Plan Reviewed: no    Topics addressed in psychotherapy include: Discussed with the patient her childhood trauma which happened mostly around 4th grade. Linked thaose experiences of not feeling heard, no one having her back, lack of parental support to her current feelings ion not being heard by patients. Assisted that client in problem solving and coping skills including not answering unasked questions and planned ignoring.     Care Plan Updated: No    Does patient express agreement with the above plan? Yes     Diagnosis:  1. Severe episode of recurrent major depressive disorder, without psychotic features (HCC)        Referral appointment(s) scheduled? No       MARIUM Rico

## 2021-06-07 PROCEDURE — RXMED WILLOW AMBULATORY MEDICATION CHARGE: Performed by: NURSE PRACTITIONER

## 2021-06-07 RX ORDER — CLINDAMYCIN PHOSPHATE 11.9 MG/ML
SOLUTION TOPICAL
Qty: 60 ML | Refills: 3 | Status: SHIPPED | OUTPATIENT
Start: 2021-06-07 | End: 2022-08-29 | Stop reason: SDUPTHER

## 2021-06-09 DIAGNOSIS — F33.2 SEVERE EPISODE OF RECURRENT MAJOR DEPRESSIVE DISORDER, WITHOUT PSYCHOTIC FEATURES (HCC): ICD-10-CM

## 2021-06-09 DIAGNOSIS — F41.9 ANXIETY: ICD-10-CM

## 2021-06-09 RX ORDER — BUPROPION HYDROCHLORIDE 150 MG/1
150 TABLET ORAL EVERY MORNING
Qty: 90 TABLET | Refills: 0 | Status: SHIPPED | OUTPATIENT
Start: 2021-06-09 | End: 2021-06-11

## 2021-06-09 RX ORDER — SERTRALINE HYDROCHLORIDE 100 MG/1
TABLET, FILM COATED ORAL
Qty: 135 TABLET | Refills: 0 | Status: SHIPPED | OUTPATIENT
Start: 2021-06-09 | End: 2021-10-21

## 2021-06-09 NOTE — TELEPHONE ENCOUNTER
Received request via: Pharmacy    Was the patient seen in the last year in this department? Yes  5/28/21  Does the patient have an active prescription (recently filled or refills available) for medication(s) requested? No

## 2021-06-11 ENCOUNTER — TELEMEDICINE (OUTPATIENT)
Dept: BEHAVIORAL HEALTH | Facility: CLINIC | Age: 26
End: 2021-06-11
Payer: COMMERCIAL

## 2021-06-11 DIAGNOSIS — F41.9 ANXIETY: ICD-10-CM

## 2021-06-11 DIAGNOSIS — F33.2 SEVERE EPISODE OF RECURRENT MAJOR DEPRESSIVE DISORDER, WITHOUT PSYCHOTIC FEATURES (HCC): ICD-10-CM

## 2021-06-11 PROCEDURE — RXMED WILLOW AMBULATORY MEDICATION CHARGE: Performed by: PSYCHIATRY & NEUROLOGY

## 2021-06-11 PROCEDURE — 99214 OFFICE O/P EST MOD 30 MIN: CPT | Mod: 95 | Performed by: PSYCHIATRY & NEUROLOGY

## 2021-06-11 RX ORDER — BUPROPION HYDROCHLORIDE 150 MG/1
150 TABLET ORAL EVERY MORNING
Qty: 90 TABLET | Refills: 0 | Status: SHIPPED | OUTPATIENT
Start: 2021-06-11 | End: 2021-10-11 | Stop reason: SDUPTHER

## 2021-06-11 NOTE — PROGRESS NOTES
[facility]  PSYCHIATRIC FOLLOW-UP NOTE    This evaluation was conducted via Zoom, using secure and encrypted videoconferencing technology. The patient was physical located at their home address in [place], [place], and the physician was located at  her home office in [place], [place]. The patient was presented by self, at home. The patient’s identity was confirmed and verbal consent for the telemedicine encounter was obtained.    CC:  Presents for follow up visit for medication evaluation and management      History Of Present Illness:  Isa Padgett is a 24 y.o. female with a history of major depressive disorder, generalized anxiety disorder, and insomnia, with comorbid medical problems including GERD, obstructive sleep apnea and obesity, presents today for follow up.  She is a former patient of Dr. Cerna who retired.    The patient reported that she did not tolerate the increased dose of the Wellbutrin from 300 mg to 400 mg that she developed tremors, nausea, dizziness and feeling tense.  She has continued on the Wellbutrin  mg in the at bedtime and this seems to be working well.  She has tolerated the increased dose of the sertraline to 150 mg.  Her mood is been averaging a 7 to a 7.5 out of 10 with 10 being a great mood.  When she was depressed she did not enjoy her music but now she does.  Work is going a lot better.  She started working with a therapist at our clinic and has had 2 appointments and this is gone pretty well but she says is not easy for her because she is never participated in therapy before.  She denies any medication side effects.  She does say that she has spots in the back of her mind that she should feel angry even though things are going well.  She is not sure if it is the medication or just her natural response to get angry about things.  Discussed a trial of reducing the dose of the Wellbutrin from 300mg to 150 mg and  "possibly going up on the dose of the Zoloft to 200 mg if this is anxiety related.  She agreed with this plan.      History from 5/ 15/20 visit:  \"The patient says that her mood has been in the middle, averaging a 5 out of 10 with 10 being a great mood, she has been more irritable over the last month.  She notices that she lacks motivation and is not as interested in things as she usually is and this is been going on for about a month.  For example she normally lives make up and she is not interested in wearing it or using it.  She knows she ought to go to the gym and workout or workout in general and she tells herself \"if I do not have to, I do not want to.\"  She says her anxiety has been pretty well controlled.  She does not let things bother her as much as she used to.  For example running late used to really upset her and now she is able to let it roll off of her back.  Regarding her insomnia some days she is able to sleep okay and then other days she has a hard time falling asleep and then wakes up during the night.  She drinks 1 cup of coffee at least in the morning.  Educated her about caffeine and that can be in her system for up to 70 hours.\"    Psychiatric History:  Denies any hospitalizations  Denies any history of SI, SA or self harm  Dr. Cerna beginning in May 2019.  She was referred by her primary care physician.  Her last appointment was August 16, 2019.  She saw Dr. DOCKERY approximately 3 times  Medication trials: She is currently taking Zoloft 50 mg and previously tried citalopram and buspirone.  She also has a prescription for Ambien.    Family History:  Brother with anxiety, she denies any family history of bipolar disorder, schizophrenia or suicide attempts.    Social History:  The patient is from [place], she is the oldest of 4 children, her youngest sibling is 12 years old.  Her mother was a single mom and said the patient was always responsible for taking care of her siblings.  She is very " protective of her younger siblings.  She says school is okay for her that she did get bullied in elementary school for her weight but she never felt vulnerable but more defensive about this.  She said it got better in middle school.  In high school she was very involved in her schoolwork but she did not like sports.  She works at a [employer] for a while and then she got pregnant with her daughter.  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    Depression screening:  Depression Screen (PHQ-2/PHQ-9) 6/21/2019 8/16/2019 8/7/2020   PHQ-2 Total Score - - 1   PHQ-2 Total Score - - -   PHQ-2 Total Score 4 3 -   PHQ-9 Total Score - - 2   PHQ-9 Total Score 10 12 -       Past Medical, Family and Social History reviewed with the patient.    Current medications and allergies reviewed with the patient.    REVIEW OF SYSTEMS:        Constitutional Positive - obesity   Eyes negative   Ears/Nose/Mouth/Throat negative   Cardiovascular negative   Respiratory Positive - GUERA   Gastrointestinal negative   Genitourinary negative   Muscular negative   Integumentary negative   Neurological negative   Endocrine negative   Hematologic/Lymphatic negative       Physical Examination and Psychiatric Mental Status Examination:  Vital signs: There were no vitals taken for this visit.    CONSTITUTIONAL:  General Appearance:  Clean, casual attire, good eye contact, engaged with provider    MUSCULOSKELETAL:  Muscle Strength and Tone:  no atrophy apparent, no abnormal movements apparent  Gait and Station:  Not able to assess.    ORIENTATION:  Oriented to time, place and person  RECENT AND REMOTE MEMORY:  Grossly intact  ATTENTION SPAN AND CONCENTRATION:  within normal range  LANGUAGE:  no deficits appreciated  FUND OF KNOWLEDGE:  has  "awareness of current events, past history and normal vocabulary  SPEECH:  normal volume, amount, rate and articulation, no perseveration or paucity of language  MOOD:  \"Better\"  AFFECT:  Mood congruent, tearful  THOUGHT PROCESS:  logical and goal directed  THOUGHT CONTENT:  Denies any SI/HI or AVH, no delusional thinking nor preoccupations appreciated  ASSOCIATIONS:  Intact, not loose, no tangentiality or circumstantiality  MEMORY:  No gross evidence of memory deficits  JUDGMENT:  adequate concerning everyday activities  INSIGHT:  adequate to psychiatric condition        DIAGNOSTIC IMPRESSION:  1. Severe episode of recurrent major depressive disorder, without psychotic features (HCC)  - buPROPion (WELLBUTRIN XL) 150 MG XL tablet; Take 1 tablet by mouth every morning.  Dispense: 90 tablet; Refill: 0     Asssessment and Plan:  1. Major depression, recurrent, severe - stable with medication  2. PATIENCE, improving  Increase Sertraline from 150 mg to 200 mg after reducing the Wellbutrin for 3 weeks, if still having feelings of anger  Reduce Wellbutrin XL from 300 mg to 150 mg, for possible SE of irritability/anger, okay to go back up on dose if decrease doesn't help, note she takes it at bedtime and is sleeping well  Continue in individual therapy for MDD, PATIENCE, and help with being assertive  Reviewed daily intentional breathing practice to reduce anxiety      3.  Obesity  Previously placed referral previously placed to [facility]'s health improvement program for dietitian evaluation    4. Insomnia, improving  Monitor caffeine intake and effect on insomnia  D/C melatonin 5 mg 1/2 to 1 up to 3 hours before bedtime, didn't help with middle insomnia   Restart Trazodone 50 mg, groggy next day but got a good night's rest  Begin Hydroxyzine Pamoate 25 mg 1 to 3 PRN insomnia or anxiety    Follow up in 6 weeks or call sooner PRN    Risks, benefits, alternatives and side effects were discussed for all medicines prescribed at this visit.  " The patient voiced understanding.  The patient agrees to call the clinic with any questions or concerns, or seek emergent medical care if warranted.    The patient has a safety plan that includes calling the 1-800 crisis line number, calling 911 and/or going to the nearest Emergency Department if symptoms worsen.    The proposed treatment plan was discussed with the patient who was provided the opportunity to ask questions and make suggestions regarding alternative treatment. The patient verbalized understanding and expressed agreement with the plan.      Frances Forbes M.D.    This note was created using voice recognition software (Dragon). The accuracy of the dictation is limited by the abilities of the software. I have reviewed the note prior to signing, however some errors in grammar and context are still possible. If you have any questions related to this note please do not hesitate to contact our office.

## 2021-06-18 ENCOUNTER — OFFICE VISIT (OUTPATIENT)
Dept: DERMATOLOGY | Facility: IMAGING CENTER | Age: 26
End: 2021-06-18
Payer: COMMERCIAL

## 2021-06-18 ENCOUNTER — PHARMACY VISIT (OUTPATIENT)
Dept: PHARMACY | Facility: MEDICAL CENTER | Age: 26
End: 2021-06-18
Payer: COMMERCIAL

## 2021-06-18 DIAGNOSIS — R21 RASH AND NONSPECIFIC SKIN ERUPTION: ICD-10-CM

## 2021-06-18 PROCEDURE — 99214 OFFICE O/P EST MOD 30 MIN: CPT | Performed by: NURSE PRACTITIONER

## 2021-06-18 PROCEDURE — RXMED WILLOW AMBULATORY MEDICATION CHARGE: Performed by: NURSE PRACTITIONER

## 2021-06-18 RX ORDER — TACROLIMUS 1 MG/G
1 OINTMENT TOPICAL 2 TIMES DAILY
Qty: 30 G | Refills: 1 | Status: SHIPPED | OUTPATIENT
Start: 2021-06-18 | End: 2023-10-12 | Stop reason: SDUPTHER

## 2021-06-18 NOTE — PROGRESS NOTES
DERMATOLOGY NOTE  FOLLOW UP VISIT        Chief complaint: Rash       Patient returns today for rash symptoms.    Some improvement with use of triamcinolone at neck and Elidel around eyes.  Does state that Elidel does burn when using.  Patient is also being treated for acne by Dr. Freed and presently uses tretinoin 0.025% cream to face in addition she uses clindamycin solution after washing with benzyl peroxide wash  She does report that this rash was present prior to use of acne treatment topicals    Initial history:  HPI:Rash around neck and eyelids  Onset: 1 month  Symptoms: itchy, dry and redness  Aggravating factors: No  Alleviating factors: No  New creams/topicals: No  New medications (up to last 6 months): No  New travel: No  Other exposures: No  Treatments: No  No difficulty breathing.  No difficulty swallowing.  No swelling of eyes or lips      No Known Allergies     MEDICATIONS:  Medications relevant to specialty reviewed.     REVIEW OF SYSTEMS:   Positive for skin (see HPI)  Negative for fevers and chills       EXAM:  There were no vitals taken for this visit.  Constitutional: Well-developed, well-nourished, and in no distress.     A focused skin exam was performed including the affected areas of the head (including face) and neck. Notable findings on exam today listed below and/or in assessment/plan.   ill defined pink scaly papules and plaques at anterior neck  Slight erythema noted periorbital area.    IMPRESSION / PLAN:    1. Rash and nonspecific skin eruption  Uncertain etiology.  Possible allergic contact.  Stop Elidel.  May continue triamcinolone to neck as needed rash  Start tacrolimus twice daily to periorbital area.  Side effects discussed.  Stop acne medications as well for at least next 2 weeks if not longer if for improvement  Referral to allergy done per patient preference.    I have performed a physical exam and reviewed and updated ROS and Plan today (6/18/2021). In review of dermatology  visit (5/3/2021) there are no changes except as documented above.        Please note that this dictation was created using voice recognition software. I have made every reasonable attempt to correct obvious errors, but I expect that there are errors of grammar and possibly content that I did not discover before finalizing the note.      Return to clinic in: Return if symptoms worsen or fail to improve. and as needed for any new or changing skin lesions.

## 2021-07-02 ENCOUNTER — PHARMACY VISIT (OUTPATIENT)
Dept: PHARMACY | Facility: MEDICAL CENTER | Age: 26
End: 2021-07-02
Payer: COMMERCIAL

## 2021-08-27 ENCOUNTER — HOSPITAL ENCOUNTER (OUTPATIENT)
Dept: LAB | Facility: MEDICAL CENTER | Age: 26
End: 2021-08-27
Attending: NURSE PRACTITIONER
Payer: COMMERCIAL

## 2021-08-27 ENCOUNTER — HOSPITAL ENCOUNTER (OUTPATIENT)
Dept: LAB | Facility: MEDICAL CENTER | Age: 26
End: 2021-08-27
Attending: OBSTETRICS & GYNECOLOGY
Payer: COMMERCIAL

## 2021-08-27 DIAGNOSIS — R68.89 COLD INTOLERANCE: ICD-10-CM

## 2021-08-27 LAB
CHOLEST SERPL-MCNC: 161 MG/DL (ref 100–199)
HCV AB SER QL: NORMAL
HDLC SERPL-MCNC: 58 MG/DL
HIV 1+2 AB+HIV1 P24 AG SERPL QL IA: NORMAL
LDLC SERPL CALC-MCNC: 93 MG/DL
TREPONEMA PALLIDUM IGG+IGM AB [PRESENCE] IN SERUM OR PLASMA BY IMMUNOASSAY: NORMAL
TRIGL SERPL-MCNC: 50 MG/DL (ref 0–149)
TSH SERPL DL<=0.005 MIU/L-ACNC: 1.83 UIU/ML (ref 0.38–5.33)

## 2021-08-27 PROCEDURE — 80061 LIPID PANEL: CPT

## 2021-08-27 PROCEDURE — 84443 ASSAY THYROID STIM HORMONE: CPT

## 2021-08-27 PROCEDURE — 87389 HIV-1 AG W/HIV-1&-2 AB AG IA: CPT

## 2021-08-27 PROCEDURE — 86803 HEPATITIS C AB TEST: CPT

## 2021-08-27 PROCEDURE — 36415 COLL VENOUS BLD VENIPUNCTURE: CPT

## 2021-08-27 PROCEDURE — 86780 TREPONEMA PALLIDUM: CPT

## 2021-09-02 PROCEDURE — RXMED WILLOW AMBULATORY MEDICATION CHARGE: Performed by: NURSE PRACTITIONER

## 2021-09-03 ENCOUNTER — PHARMACY VISIT (OUTPATIENT)
Dept: PHARMACY | Facility: MEDICAL CENTER | Age: 26
End: 2021-09-03
Payer: COMMERCIAL

## 2021-09-27 ENCOUNTER — IMMUNIZATION (OUTPATIENT)
Dept: OCCUPATIONAL MEDICINE | Facility: CLINIC | Age: 26
End: 2021-09-27
Payer: COMMERCIAL

## 2021-09-27 DIAGNOSIS — Z23 NEED FOR VACCINATION: Primary | ICD-10-CM

## 2021-09-27 PROCEDURE — 90686 IIV4 VACC NO PRSV 0.5 ML IM: CPT | Performed by: FAMILY MEDICINE

## 2021-10-11 ENCOUNTER — PATIENT MESSAGE (OUTPATIENT)
Dept: BEHAVIORAL HEALTH | Facility: CLINIC | Age: 26
End: 2021-10-11

## 2021-10-11 DIAGNOSIS — F33.2 SEVERE EPISODE OF RECURRENT MAJOR DEPRESSIVE DISORDER, WITHOUT PSYCHOTIC FEATURES (HCC): ICD-10-CM

## 2021-10-12 RX ORDER — BUPROPION HYDROCHLORIDE 150 MG/1
150 TABLET ORAL EVERY MORNING
Qty: 90 TABLET | Refills: 0 | Status: SHIPPED | OUTPATIENT
Start: 2021-10-12 | End: 2022-02-04 | Stop reason: SDUPTHER

## 2021-10-13 PROCEDURE — RXMED WILLOW AMBULATORY MEDICATION CHARGE: Performed by: PSYCHIATRY & NEUROLOGY

## 2021-10-18 ENCOUNTER — PHARMACY VISIT (OUTPATIENT)
Dept: PHARMACY | Facility: MEDICAL CENTER | Age: 26
End: 2021-10-18
Payer: COMMERCIAL

## 2021-11-12 ENCOUNTER — TELEMEDICINE (OUTPATIENT)
Dept: MEDICAL GROUP | Facility: MEDICAL CENTER | Age: 26
End: 2021-11-12
Payer: COMMERCIAL

## 2021-11-12 ENCOUNTER — TELEMEDICINE (OUTPATIENT)
Dept: BEHAVIORAL HEALTH | Facility: CLINIC | Age: 26
End: 2021-11-12
Payer: COMMERCIAL

## 2021-11-12 DIAGNOSIS — F41.9 ANXIETY: ICD-10-CM

## 2021-11-12 DIAGNOSIS — G47.33 OSA (OBSTRUCTIVE SLEEP APNEA): ICD-10-CM

## 2021-11-12 DIAGNOSIS — R76.8 POSITIVE ANA (ANTINUCLEAR ANTIBODY): ICD-10-CM

## 2021-11-12 DIAGNOSIS — R76.8 ANTI-TPO ANTIBODIES PRESENT: ICD-10-CM

## 2021-11-12 DIAGNOSIS — G47.00 INSOMNIA, UNSPECIFIED TYPE: ICD-10-CM

## 2021-11-12 DIAGNOSIS — K21.00 GASTROESOPHAGEAL REFLUX DISEASE WITH ESOPHAGITIS: ICD-10-CM

## 2021-11-12 DIAGNOSIS — R68.89 COLD INTOLERANCE: ICD-10-CM

## 2021-11-12 DIAGNOSIS — F33.2 SEVERE EPISODE OF RECURRENT MAJOR DEPRESSIVE DISORDER, WITHOUT PSYCHOTIC FEATURES (HCC): ICD-10-CM

## 2021-11-12 PROCEDURE — RXMED WILLOW AMBULATORY MEDICATION CHARGE: Performed by: PSYCHIATRY & NEUROLOGY

## 2021-11-12 PROCEDURE — 99213 OFFICE O/P EST LOW 20 MIN: CPT | Mod: 95 | Performed by: NURSE PRACTITIONER

## 2021-11-12 PROCEDURE — 90833 PSYTX W PT W E/M 30 MIN: CPT | Mod: 95 | Performed by: PSYCHIATRY & NEUROLOGY

## 2021-11-12 PROCEDURE — 99214 OFFICE O/P EST MOD 30 MIN: CPT | Mod: 95 | Performed by: PSYCHIATRY & NEUROLOGY

## 2021-11-12 PROCEDURE — RXMED WILLOW AMBULATORY MEDICATION CHARGE: Performed by: NURSE PRACTITIONER

## 2021-11-12 RX ORDER — HYDROXYZINE PAMOATE 25 MG/1
CAPSULE ORAL
Qty: 90 CAPSULE | Refills: 3 | Status: SHIPPED | OUTPATIENT
Start: 2021-11-12 | End: 2024-01-04

## 2021-11-12 RX ORDER — SERTRALINE HYDROCHLORIDE 100 MG/1
200 TABLET, FILM COATED ORAL DAILY
Qty: 180 TABLET | Refills: 0 | Status: SHIPPED | OUTPATIENT
Start: 2021-11-12 | End: 2022-02-04 | Stop reason: SDUPTHER

## 2021-11-12 RX ORDER — OMEPRAZOLE 40 MG/1
40 CAPSULE, DELAYED RELEASE ORAL DAILY
Qty: 90 CAPSULE | Refills: 3 | Status: SHIPPED | OUTPATIENT
Start: 2021-11-12 | End: 2022-12-29 | Stop reason: SDUPTHER

## 2021-11-12 RX ORDER — BUPROPION HYDROCHLORIDE 300 MG/1
300 TABLET ORAL EVERY MORNING
Qty: 90 TABLET | Refills: 0 | Status: SHIPPED | OUTPATIENT
Start: 2021-11-12 | End: 2022-02-04 | Stop reason: SDUPTHER

## 2021-11-12 ASSESSMENT — PATIENT HEALTH QUESTIONNAIRE - PHQ9
CLINICAL INTERPRETATION OF PHQ2 SCORE: 4
5. POOR APPETITE OR OVEREATING: 1 - SEVERAL DAYS
SUM OF ALL RESPONSES TO PHQ QUESTIONS 1-9: 13

## 2021-11-12 NOTE — PROGRESS NOTES
"[facility]  PSYCHIATRIC FOLLOW-UP NOTE    This evaluation was conducted via Zoom, using secure and encrypted videoconferencing technology. The patient was physical located at their home address in [place], [place], and the physician was located at  her home office in [place], [place]. The patient was presented by self, at home. The patient’s identity was confirmed and verbal consent for the telemedicine encounter was obtained.    CC:  Presents for follow up visit for medication evaluation and management      History Of Present Illness:  Isa Padgett is a 26 y.o. female with a history of MDD, PATIENCE, and insomnia, with comorbid medical problems including GERD, obstructive sleep apnea and obesity, presents today for follow up.  She is a former patient of Dr. Cerna who retired.      The patient reported the following:  She has been feeling more anxious and overwhelmed recently, she works for [employer], she is very conscientious and so has to manage her anxiety when she isn't able to achieve to her level of expectation, has a new romantic relationship x 6 months, unexpected, and it is taking more time and energy and she didn't have a lot extra before.  Added stress is that he has a son and parent styles are different, hers are more strict, and so requires him to follow her house rules and he complained to his mother.  She wakes up feeling tired, endorses feeling depressed, has been overeating even though her appetite is lower and not planning ahead for her meals.  She did not increase the Zoloft from 150 mg to 200 mg but did reduce the Wellbutrin from 300 mg to 150 mg and this did improve her anger/irritability.  She takes the Wellbutrin at bedtime, does drink 1 cup of coffee at work in AM.  Denies any SI.    History from 5/ 15/20 visit:  \"The patient says that her mood has been in the middle, averaging a 5 out of 10 with 10 being a great mood, she has been more " "irritable over the last month.  She notices that she lacks motivation and is not as interested in things as she usually is and this is been going on for about a month.  For example she normally lives make up and she is not interested in wearing it or using it.  She knows she ought to go to the gym and workout or workout in general and she tells herself \"if I do not have to, I do not want to.\"  She says her anxiety has been pretty well controlled.  She does not let things bother her as much as she used to.  For example running late used to really upset her and now she is able to let it roll off of her back.  Regarding her insomnia some days she is able to sleep okay and then other days she has a hard time falling asleep and then wakes up during the night.  She drinks 1 cup of coffee at least in the morning.  Educated her about caffeine and that can be in her system for up to 70 hours.\"    Psychiatric History:  Denies any hospitalizations  Denies any history of SI, SA or self harm  Dr. Cerna beginning in May 2019.  She was referred by her primary care physician.  Her last appointment was August 16, 2019.  She saw Dr. DOCKERY approximately 3 times  Medication trials: She is currently taking Zoloft 50 mg and previously tried citalopram and buspirone.  She also has a prescription for Ambien.    Family History:  Brother with anxiety, she denies any family history of bipolar disorder, schizophrenia or suicide attempts.    Social History:  The patient is from [place], she is the oldest of 4 children, her youngest sibling is 12 years old.  Her mother was a single mom and said the patient was always responsible for taking care of her siblings.  She is very protective of her younger siblings.  She says school is okay for her that she did get bullied in elementary school for her weight but she never felt vulnerable but more defensive about this.  She said it got better in middle school.  In high school she was very involved in her " "schoolwork but she did not like sports.  She works at a [employer] for a while and then she got pregnant with her daughter.  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    Depression screening:  Depression Screen (PHQ-2/PHQ-9) 8/16/2019 8/7/2020 11/12/2021   PHQ-2 Total Score - 1 -   PHQ-2 Total Score - - -   PHQ-2 Total Score 3 - 4   PHQ-9 Total Score - 2 -   PHQ-9 Total Score 12 - 13       Past Medical, Family and Social History reviewed with the patient.    Current medications and allergies reviewed with the patient.    REVIEW OF SYSTEMS:        Constitutional Positive - obesity   Eyes negative   Ears/Nose/Mouth/Throat negative   Cardiovascular negative   Respiratory Positive - GUERA   Gastrointestinal negative   Genitourinary negative   Muscular negative   Integumentary negative   Neurological negative   Endocrine negative   Hematologic/Lymphatic negative       Physical Examination and Psychiatric Mental Status Examination:  Vital signs: There were no vitals taken for this visit.    CONSTITUTIONAL:  General Appearance:  Clean, casual attire, good eye contact, engaged with provider    MUSCULOSKELETAL:  Muscle Strength and Tone:  no atrophy apparent, no abnormal movements apparent  Gait and Station:  Not able to assess.    ORIENTATION:  Oriented to time, place and person  RECENT AND REMOTE MEMORY:  Grossly intact  ATTENTION SPAN AND CONCENTRATION:  within normal range  LANGUAGE:  no deficits appreciated  FUND OF KNOWLEDGE:  has awareness of current events, past history and normal vocabulary  SPEECH:  normal volume, amount, rate and articulation, no perseveration or paucity of language  MOOD:  \"Overwhelmed\"  AFFECT:  Constricted  THOUGHT PROCESS:  logical and goal directed  THOUGHT CONTENT:  Denies " any SI/HI or AVH, no delusional thinking nor preoccupations appreciated  ASSOCIATIONS:  Intact, not loose, no tangentiality or circumstantiality  MEMORY:  No gross evidence of memory deficits  JUDGMENT:  adequate concerning everyday activities  INSIGHT:  adequate to psychiatric condition        DIAGNOSTIC IMPRESSION:  1. Severe episode of recurrent major depressive disorder, without psychotic features (HCC)  - buPROPion (WELLBUTRIN XL) 300 MG XL tablet; Take 1 Tablet by mouth every morning. Combine with 150 mg for a total daily dose of 450 mg.  Dispense: 90 Tablet; Refill: 0  - sertraline (ZOLOFT) 100 MG Tab; Take 2 Tablets by mouth every day.  Dispense: 180 Tablet; Refill: 0    2. Anxiety  - sertraline (ZOLOFT) 100 MG Tab; Take 2 Tablets by mouth every day.  Dispense: 180 Tablet; Refill: 0     Asssessment and Plan:  1. Major depression, recurrent, severe, worsening  2. PATIENCE, worsening  R/o ADHD  R/o Binge eating disorder  Increase Sertraline from 150 mg to 200 mg  Increase Wellbutrin XL from 150 mg to 300 mg and move to AM from bedtime, made her feel angry at 300 mg previously, okay to increase to 450 mg after 14 days, as tolerated  Continue in individual therapy for MDD, PATIENCE, and help with being assertive  Reviewed daily intentional breathing practice to reduce anxiety   Screen for binge eating disorder at future appt and consider stimulant if positive  Reviewed previous encounter HPI, medication history, social hx and treatment plan in preparation for visit  Consider referral to psychotherapy outside of her appts with this MD       3.  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 Obesity, r/o binge eating disorder    5. Insomnia, worsening  Reduce caffeine intake  D/C melatonin 5 mg 1/2 to 1 up to 3 hours before bedtime, didn't help with middle insomnia   Not taking: Trazodone 50 mg, groggy next day but  got a good night's rest  Not taking Hydroxyzine Pamoate 25 mg 1 to 3 PRN insomnia or anxiety    Follow up in 6 weeks or call sooner PRN    Risks, benefits, alternatives and side effects were discussed for all medicines prescribed at this visit.  The patient voiced understanding.  The patient agrees to call the clinic with any questions or concerns, or seek emergent medical care if warranted.    The patient has a safety plan that includes calling the 1-800 crisis line number, calling 911 and/or going to the nearest Emergency Department if symptoms worsen.    The proposed treatment plan was discussed with the patient who was provided the opportunity to ask questions and make suggestions regarding alternative treatment. The patient verbalized understanding and expressed agreement with the plan.    Greater than 16 minutes of the visit was spent in psychotherapy.     Psychotherapy include:  Supportive psychotherapy topics: new relationship, time and energy it requires, stressor with his son due to different parenting.   Setting boundaries to protect herself, worrying about whether she has done the right thing and how the relationship will be impacted.  Positives of her personality type.        Frances Forbes M.D.    This note was created using voice recognition software (Dragon). The accuracy of the dictation is limited by the abilities of the software. I have reviewed the note prior to signing, however some errors in grammar and context are still possible. If you have any questions related to this note please do not hesitate to contact our office.

## 2021-11-12 NOTE — PROGRESS NOTES
Telemedicine Video Visit: Established Patient   This Remote Face to Face encounter was conducted via Zoom. Given the importance of social distancing and other strategies recommended to reduce the risk of COVID-19 transmission, I am providing medical care to this patient via audio/video visit in place of an in person visit at the request of the patient. Verbal consent to telehealth, risks, benefits, and consequences were discussed. Patient retains the right to withdraw at any time. All existing confidentiality protections apply. The patient has access to all transmitted medical information. No dissemination of any patient images or information to other entities without further written consent.  Subjective:     Chief Complaint   Patient presents with   • Medication Refill       Isa Padgett is a 26 y.o. female presenting for evaluation and management of:    Frustrated by her weight.    MDD  Most recently her sertraline was increased and her Wellbutrin was increased. Continue to follow up with psychiatry and psychology for this. Recommended to cut back on her caffeine.     GUERA  Reports having mild sleep apnea. Currently not on therapy. Home sleep study about 1 year year. AHI of 7. She has tried the dental appliance but caused some dental damage.     Cold intolerance  Continues to report purplish hue to her finger nails when she exposed to severe cold temperatures.      GERD  Needs refeills on omeprazole.     Positive NATIVIDAD (antinuclear antibody)  Positive on most recent labs.  Low titer.    Positive TPO antibodies  Historically present.    Ref. Range 3/26/2021 12:36 8/27/2021 13:10   TSH Latest Ref Range: 0.380 - 5.330 uIU/mL 1.320 1.830       ROS see above  Denies any recent fevers or chills. No nausea or vomiting. No chest pains or shortness of breath.     No Known Allergies    Current medicines (including changes today)  Current Outpatient Medications   Medication Sig Dispense Refill   • buPROPion  (WELLBUTRIN XL) 300 MG XL tablet Take 1 Tablet by mouth every morning. Combine with 150 mg for a total daily dose of 450 mg. 90 Tablet 0   • sertraline (ZOLOFT) 100 MG Tab Take 2 Tablets by mouth every day. 180 Tablet 0   • omeprazole (PRILOSEC) 40 MG delayed-release capsule Take 1 Capsule by mouth every day. 90 Capsule 3   • hydrOXYzine pamoate (VISTARIL) 25 MG Cap Take 1 to 3 tablets by mouth once a day as need for insomnia or anxiety 90 Capsule 3   • buPROPion (WELLBUTRIN XL) 150 MG XL tablet Take 1 Tablet by mouth every morning. 90 Tablet 0   • tacrolimus (PROTOPIC) 0.1 % Ointment Apply sparingly to affected area on face 2 times a day. 30 g 1   • clindamycin (CLEOCIN) 1 % Solution Apply to affected area on face/shoulders once or twice daliy after benzoyl peroxide wash for acne. 60 mL 3   • tretinoin (RETIN-A) 0.025 % cream Apply to affected area on face/shoulders at bedtime for acne. 45 g 3   • triamcinolone acetonide (KENALOG) 0.1 % Ointment Apply 1 Application topically 2 times a day. body 45 g 1   • albuterol 108 (90 Base) MCG/ACT Aero Soln inhalation aerosol Inhale 2 Puffs by mouth every 6 hours as needed for Shortness of Breath. 8.5 g 11     No current facility-administered medications for this visit.       Patient Active Problem List    Diagnosis Date Noted   • Cold intolerance 05/17/2021   • Positive NATIVIDAD (antinuclear antibody) 05/17/2021   • Severe episode of recurrent major depressive disorder, without psychotic features ([facility]) 03/19/2021   • BMI 29.0-29.9,adult 10/15/2020   • GUERA (obstructive sleep apnea) 05/29/2018   • Insomnia 03/30/2018   • Anxiety 01/23/2018   • Obesity (BMI 30.0-34.9) 08/01/2017   • Gastroesophageal reflux disease with esophagitis 03/21/2017   • Dysthymia 11/08/2016       Family History   Problem Relation Age of Onset   • Cancer Maternal Aunt         breast cancer    • Hyperlipidemia Maternal Grandfather    • Diabetes Maternal Grandfather         diet control   • Hypertension  Maternal Grandfather    • Heart Disease Maternal Grandfather    • Lupus Mother    • Rheumatologic Disease Mother    • Hypertension Father    • Diabetes Maternal Uncle    • Lung Disease Paternal Aunt         asthma       She  has a past medical history of Anemia (2013), Anxiety (1/23/2018), Depression, Dysthymia (11/8/2016), Infectious mononucleosis, and Nausea and vomiting in pregnancy (2/20/2013). She also has no past medical history of Addisons disease (HCC), Adrenal disorder (HCC), Allergy, ASTHMA, Blood transfusion, CATARACT, CHF (congestive heart failure) (HCC), Clotting disorder (HCC), COPD, Cushings syndrome (HCC), Diabetes, Diabetic neuropathy (HCC), EMPHYSEMA, Goiter, Headache(784.0), Heart attack (HCC), HIV (human immunodeficiency virus infection), Hyperlipidemia, IBD (inflammatory bowel disease), Kidney disease, Meningitis, Muscle disorder, OSTEOPOROSIS, Parathyroid disorder (HCC), Pituitary disease (HCC), Sickle cell disease (HCC), Substance abuse (HCC), Thyroid disease, Ulcer, or Urinary tract infection, site not specified.  She  has a past surgical history that includes finger orif (6/24/2014) and percutaneous pinning (6/24/2014).       Objective:   Vitals obtained by patient:  There were no vitals taken for this visit.    Physical Exam:  Constitutional: Alert, no distress, well-groomed.  Skin: No rashes in visible areas.  Eye: Round. Conjunctiva clear, lids normal. No icterus.   ENMT: Lips pink without lesions, good dentition, moist mucous membranes. Phonation normal.  Neck: No masses, no thyromegaly. Moves freely without pain.  CV: Pulse as reported by patient  Respiratory: Unlabored respiratory effort, no cough or audible wheeze  Psych: Alert and oriented x3, normal affect and mood.       Assessment and Plan:   The following treatment plan was discussed:     1. Anti-TPO antibodies present  TSH stable. Continue to monitor.     2. Positive NATIVIDAD (antinuclear antibody)  Historically present. Low titer.     - THYROID PEROXIDASE  (TPO) AB; Future  - NATIVIDAD REFLEXIVE PROFILE; Future  - TSH WITH REFLEX TO FT4; Future    3. Cold intolerance  Ongoing issue. No clear etiology. Normal TSH.     4. Gastroesophageal reflux disease with esophagitis  Chronic. Symptoms controlled with Omeprazole. Refills provided.   - omeprazole (PRILOSEC) 40 MG delayed-release capsule; Take 1 Capsule by mouth every day.  Dispense: 90 Capsule; Refill: 3    5. Severe episode of recurrent major depressive disorder, without psychotic features (HCC)  Chronic. Continue to follow up with  for this.     6. GUERA (obstructive sleep apnea)  Recommend f/u with Pulmonology for this.     Other orders  - hydrOXYzine pamoate (VISTARIL) 25 MG Cap; Take 1 to 3 tablets by mouth once a day as need for insomnia or anxiety  Dispense: 90 Capsule; Refill: 3        Follow-up: Return in about 4 months (around 3/12/2022) for Lab results.    Face to Face Video Visit:     BONI Leigh

## 2021-11-15 DIAGNOSIS — R06.02 SHORTNESS OF BREATH: ICD-10-CM

## 2021-11-15 DIAGNOSIS — R07.9 CHEST PAIN, UNSPECIFIED TYPE: ICD-10-CM

## 2021-11-19 ENCOUNTER — TELEMEDICINE (OUTPATIENT)
Dept: BEHAVIORAL HEALTH | Facility: CLINIC | Age: 26
End: 2021-11-19
Payer: COMMERCIAL

## 2021-11-19 DIAGNOSIS — F33.2 SEVERE EPISODE OF RECURRENT MAJOR DEPRESSIVE DISORDER, WITHOUT PSYCHOTIC FEATURES (HCC): ICD-10-CM

## 2021-11-19 PROCEDURE — 90834 PSYTX W PT 45 MINUTES: CPT | Mod: 95 | Performed by: MARRIAGE & FAMILY THERAPIST

## 2021-11-19 NOTE — PROGRESS NOTES
[facility]   Therapy Progress Note      This visit was conducted via Zoom using secure and encrypted videoconferencing technology.  The patient was in a private location in the [facility].  The patient's identity was confirmed and verbal consent was obtained for this virtual visit.      Name: Isa Padgett  MRN: [id number]  : 1995  Age: 26 y.o.  Date of assessment: 2021  PCP: BONI Leigh  Persons in attendance: Patient  Total session time: 45 minutes    Objective Observations:   Participation:Active verbal participation, Attentive and Engaged   Grooming:Casual   Cognition:Alert   Eye Contact:Good   Mood:Euthymic   Affect:Flexible and Congruent with content   Thought Process:Logical and Goal-directed   Speech:Rate within normal limits and Volume within normal limits    Current Risk:   Suicide: low   Homicide: low   Self-Harm: low   Relapse: low   Safety Plan Reviewed: not applicable    Topics addressed in psychotherapy include: Patient reported that she has a new person in her life. The patient feels that her routine is different with this relationship. Patient stated that she is feeling  Overwhelmed with situations.  Worked with the patient on setting boundaries and not allowing others to “put things on her plate”.     Care Plan Updated: No    Does patient express agreement with the above plan? Yes     Diagnosis:  1. Severe episode of recurrent major depressive disorder, without psychotic features (HCC)        Referral appointment(s) scheduled? No       MARIUM Rico

## 2021-11-21 PROBLEM — R76.8 POSITIVE ANA (ANTINUCLEAR ANTIBODY): Chronic | Status: ACTIVE | Noted: 2021-05-17

## 2021-11-21 PROBLEM — F33.2 SEVERE EPISODE OF RECURRENT MAJOR DEPRESSIVE DISORDER, WITHOUT PSYCHOTIC FEATURES (HCC): Chronic | Status: ACTIVE | Noted: 2021-03-19

## 2021-11-21 PROBLEM — K21.00 GASTROESOPHAGEAL REFLUX DISEASE WITH ESOPHAGITIS: Chronic | Status: ACTIVE | Noted: 2017-03-21

## 2021-11-21 PROBLEM — R68.89 COLD INTOLERANCE: Chronic | Status: ACTIVE | Noted: 2021-05-17

## 2021-11-23 ENCOUNTER — PHARMACY VISIT (OUTPATIENT)
Dept: PHARMACY | Facility: MEDICAL CENTER | Age: 26
End: 2021-11-23
Payer: COMMERCIAL

## 2021-12-14 NOTE — TELEPHONE ENCOUNTER
Called in medciation to pharmacy   Pended phone in rx   Routed to Dr Orlando Vásquez for sign off    Hospital to [facility] [address]                                                                        65 y.o.   male    [address]   Room: [facility] CARDIOPULMONARY CARE  Unit Phone# :  [phone]      [address]  [facility] [place] [address]  Dept: [phone]  Loc: [phone]                    SITUATION     Admitted:  12/5/2021         Attending Provider:  John Milner MD       Consultations:  IP CONSULT TO ORTHOPEDIC SURGERY  IP CONSULT TO CARDIOLOGY  IP CONSULT TO INFECTIOUS DISEASES    PCP:  Jessika Sommer MD   [phone]    Treatment Team: Attending Provider: John Milner MD; Consulting Provider: Pliar Dee [place]; Consulting Provider: Cata Chu MD; Utilization Review: Paula Stafford RN; Consulting Provider: Nolberto Olivares MD; Care Manager: Hollie Sanon; Physician: Stefany Fuentes MD; Consulting Provider: Anthony Monreal MD; Consulting Provider: Sarah Mansfield MD; Consulting Provider: Rick Yeh MD; Primary Nurse: Danish Anton    Admitting Dx:  Respiratory failure, acute-on-chronic ([facility] [J96.20]  CHF exacerbation ([facility] [I50.9]  Febrile [R50.9]  Atrial flutter ([facility] [I48.92]  Elevated brain natriuretic peptide (BNP) level [R79.89]       Principal Problem: <principal problem not specified>    * No surgery found * of      BY: * Surgery not found *             ON: * No surgery found *                  Code Status: Full Code                Advance Directives:   Advance Care Planning 12/6/2021   Confirm Advance Directive None   Patient Would Like to Complete Advance Directive -    (Send w/patient)   No Doesnt Have       Isolation:  There are currently no Active Isolations       MDRO: No current active infections    Pain Medications given:  Tramadol    Last dose: 12/12/21 at  [id number]    Special Equipment needed: yes  Type of equipment: Ankle Boot for R. Ankle       BACKGROUND     Allergies:  No Known Allergies    Past Medical History:   Diagnosis Date    Arrhythmia     SVT; Dr. Yris Ramon Fall 2018    Chronic obstructive pulmonary disease (HCC)     mild    GERD (gastroesophageal reflux disease)     Hypertension     Thyroid disease     benign thyroid growth, checked annually by Dr. Kim Biggs       Past Surgical History:   Procedure Laterality Date    COLONOSCOPY N/A 2/5/2019    COLONOSCOPY [facility] Suzanne [facility]en [facility] at [facility]COPY    COLONOSCOPY,REMV LESN,SNARE  2/5/2019         HX HEENT Bilateral     cataract extraction    [place] COLON CA SCRN NOT HI RSK IND  2/5/2019            Medications Prior to Admission   Medication Sig    amiodarone (PACERONE) 400 mg tablet Take 1 Tablet by mouth.  dilTIAZem ER (TIAZAC) 240 mg capsule Take 240 mg by mouth daily. Indications: paroxysmal supraventricular tachycardia    finasteride (PROSCAR) 5 mg tablet Take 5 mg by mouth daily.  vit A/vit C/vit E/zinc/copper (PRESERVISION AREDS PO) Take 1 Caplet by mouth two (2) times a day.  ascorbic acid, vitamin C, (Vitamin C) 500 mg tablet Take 500 mg by mouth daily.  cyanocobalamin (Vitamin B-12) 1,000 mcg tablet Take 1,000 mcg by mouth daily.  ibuprofen (MOTRIN) 200 mg tablet Take 400 mg by mouth every eight (8) hours as needed for Pain.  fluticasone-umeclidinium-vilanterol (Trelegy Ellipta) 100-62.5-25 mcg inhaler Take 1 Puff by inhalation daily.  pantoprazole (Protonix) 40 mg tablet Take 40 mg by mouth daily.  furosemide (LASIX) 20 mg tablet 1 tab po daily    apixaban (ELIQUIS) 5 mg tablet Take 1 Tab by mouth two (2) times a day.  rosuvastatin (Crestor) 40 mg tablet Take 40 mg by mouth nightly.  tamsulosin (FLOMAX) 0.4 mg capsule Take 0.4 mg by mouth daily.  aspirin delayed-release 81 mg tablet Take 81 mg by mouth daily.  melatonin 3 mg tablet Take 3 mg by mouth nightly.     gemfibrozil (LOPID) 600 mg tablet Take 600 mg by mouth two (2) times a day.  cholecalciferol (VITAMIN D3) 1,000 unit cap Take 1,000 Units by mouth daily.  MULTIVIT-MIN/FA/LYCOPEN/LUTEIN (CENTRUM SILVER ULTRA MEN'S PO) Take 1 Tab by mouth daily.  Omega-3-DHA-EPA-Fish Oil 1,000 mg (120 mg-180 mg) cap Take 1 Cap by mouth daily.  [DISCONTINUED] metoprolol succinate (Toprol XL) 25 mg XL tablet Take 25 mg by mouth daily.  albuterol (VENTOLIN HFA) 90 mcg/actuation inhaler Take 1 Puff by inhalation every four (4) hours as needed. Hard scripts included in transfer packet yes - Tramadol Q8    Vaccinations: There is no immunization history on file for this patient. Readmission Risks:    Known Risks: Pre-existing health conditions        The Charlson CoMorbitiy Index tool is an evidenced based tool that has more automatic generated information. The tool looks at many different items such as the age of the patient, how many times they were admitted in the last calendar year, current length of stay in the hospital and their diagnosis. All of these items are pulled automatically from information documented in the chart from various places and will generate a score that predicts whether a patient is at low (less than 13), medium (13-20) or high (21 or greater) risk of being readmitted.         ASSESSMENT                Temp: 98 °F (36.7 °C) (12/14/21 1030) Pulse (Heart Rate): 70 (12/14/21 1030)     Resp Rate: 20 (12/14/21 1030)           BP: (!) 149/72 (12/14/21 1030)     O2 Sat (%): 93 % (12/14/21 1030)     Weight: 117.7 kg (259 lb 7.7 oz)    Height: 5' 11\" (180.3 cm) (12/13/21 1405)       If above not within 1 hour of discharge:    BP:_____  P:____  R:____ T:_____ O2 Sat: ___%  O2: ______    Active Orders   Diet    ADULT DIET Regular         Orientation: oriented to time, place, person and situation     Active Behaviors: None                                   Active Lines/Drains:  (Peg Tube / Sauceda / CL or S/L?): yes    Urinary Status: Voiding, External catheter     Last BM: Last Bowel Movement Date: 12/12/21     Skin Integrity: Intact             Mobility: Slightly limited   Weight Bearing Status: WBAT (Weight Bearing as Tolerated)      Gait Training  Assistive Device: Gait belt, Walker, rolling  Ambulation - Level of Assistance: Moderate assistance, Maximum assistance, Additional time, Assist x2  Distance (ft): 60 Feet (ft)  Interventions: Safety awareness training, Tactile cues, Verbal cues         Lab Results   Component Value Date/Time    Glucose 107 (H) 12/12/2021 12:33 AM    HGB 11.0 (L) 12/13/2021 02:08 AM    HGB 11.2 (L) 12/12/2021 12:33 AM        RECOMMENDATION     See After Visit Summary (AVS) for:  · Discharge instructions  · After [address]   · Special equipment needed (entered pre-discharge by Care Management)  · Medication Reconciliation    · Follow up Appointment(s)         Report given/sent by:  Greggory Olszewski                    Verbal report given to:  Shira Vazquez RN  FAXED to:  Sent with pt via [facility]         Estimated discharge time:  12/14/2021 at 1300

## 2022-01-07 ENCOUNTER — OFFICE VISIT (OUTPATIENT)
Dept: DERMATOLOGY | Facility: IMAGING CENTER | Age: 27
End: 2022-01-07
Payer: COMMERCIAL

## 2022-01-07 DIAGNOSIS — Z12.83 SKIN CANCER SCREENING: ICD-10-CM

## 2022-01-07 DIAGNOSIS — D22.9 NEVUS: ICD-10-CM

## 2022-01-07 DIAGNOSIS — L81.9 HYPERPIGMENTATION: ICD-10-CM

## 2022-01-07 DIAGNOSIS — L70.0 ACNE VULGARIS: ICD-10-CM

## 2022-01-07 DIAGNOSIS — L90.8 SKIN AGING: ICD-10-CM

## 2022-01-07 PROCEDURE — 99213 OFFICE O/P EST LOW 20 MIN: CPT | Performed by: DERMATOLOGY

## 2022-01-07 NOTE — PROGRESS NOTES
CC: Follow up SHIVAM       Subjective:Previously seen patient here for skin screening  No new concerns today     History of skin cancer: No  History of biopsies:No  History of blistering/severe sunburns:No  Family history of skin cancer:No  Family history of atypical moles:No    ROS: no fevers/chills. No itch.  No cough  DermPMH: no skin cancer/melanoma  No problem-specific Assessment & Plan notes found for this encounter.    Relevant PMH: NC  Social: former smoker. Uses sunprotection regularly and avoids sun    PE: Gen:WDWN female in NAD. Skin: Scalp/face/eyes/lips/neck/back/arms/legs/hands/feet - without suspicious lesions noted.  Chest/breast/abdomen/buttocks/Genitals exam declined  -few hyperpigmented macules on face/torso/extremities, appearing benign  -acneiform nodule X 1 right cheek    A/P: Nevi: benign appearing:  -Reviewed ABCDEs  -Reviewed skin cancer detection/prevention  -RTC PRN growth/changes/concerning features    PIPA:   -anticipate resolution with time    Acne, face:   -cont past regimen:   -Retin A 0.025% cream QHS  -OTC Benzoyl peroxide + clindamycin solution Qday-BID  -f/u 1 year for refills/PRN    F/u Q2-3 years/PRN    I have reviewed medications relevant to my specialty.

## 2022-01-21 ENCOUNTER — OFFICE VISIT (OUTPATIENT)
Dept: MEDICAL GROUP | Facility: MEDICAL CENTER | Age: 27
End: 2022-01-21
Payer: COMMERCIAL

## 2022-01-21 VITALS
HEART RATE: 97 BPM | WEIGHT: 170 LBS | SYSTOLIC BLOOD PRESSURE: 100 MMHG | DIASTOLIC BLOOD PRESSURE: 60 MMHG | BODY MASS INDEX: 34.27 KG/M2 | TEMPERATURE: 97.4 F | HEIGHT: 59 IN | OXYGEN SATURATION: 96 % | RESPIRATION RATE: 16 BRPM

## 2022-01-21 DIAGNOSIS — R10.9 FLANK PAIN: ICD-10-CM

## 2022-01-21 DIAGNOSIS — R82.90 ABNORMAL URINE ODOR: ICD-10-CM

## 2022-01-21 PROCEDURE — 99213 OFFICE O/P EST LOW 20 MIN: CPT | Performed by: NURSE PRACTITIONER

## 2022-01-21 ASSESSMENT — PATIENT HEALTH QUESTIONNAIRE - PHQ9: CLINICAL INTERPRETATION OF PHQ2 SCORE: 0

## 2022-01-21 NOTE — PROGRESS NOTES
"Chief Complaint   Patient presents with   • Other     urine changes       Subjective:     HPI:     Isa Padgett is a 26 y.o. female   here to discuss the evaluation and management of:     Chart reviewed  Labs reviewed    Presents with complaints of having a \"strong smell to her urine\" back in December. Drinks enough water and cranberry tablets. She tells me it has improved a bit but still lingering. Has been more than one year ago since she had a UTI. She also tells me she has intermittent bilateral flank pain. She describes it as a brief stabbing pain which only last a few seconds. She denies common UTI symptoms such as urine frequency/urgency/hematuria. No n/v/d or pelvic pain.  She does suffer from constipation.No hx of kidneys stones. No vaginal discharge. No incontinence unless forceful coughing. She is concerned about a kidney infection.       ROS:  Denies any Headache, Blurred Vision, Confusion, Chest pain,  Shortness of breath,  Abdominal pain, Changes of bowel or bladder, Lower ext edema, Fevers, Nights sweats, Weight Changes, Focal weakness or numbness.  And all other systems reviewed and are all negative. POSITIVE FOR : see above        Current Outpatient Medications:   •  buPROPion (WELLBUTRIN XL) 300 MG XL tablet, Take 1 Tablet by mouth every morning. Combine with 150 mg for a total daily dose of 450 mg., Disp: 90 Tablet, Rfl: 0  •  sertraline (ZOLOFT) 100 MG Tab, Take 2 Tablets by mouth every day., Disp: 180 Tablet, Rfl: 0  •  omeprazole (PRILOSEC) 40 MG delayed-release capsule, Take 1 Capsule by mouth every day., Disp: 90 Capsule, Rfl: 3  •  hydrOXYzine pamoate (VISTARIL) 25 MG Cap, Take 1 to 3 tablets by mouth once a day as need for insomnia or anxiety, Disp: 90 Capsule, Rfl: 3  •  tacrolimus (PROTOPIC) 0.1 % Ointment, Apply sparingly to affected area on face 2 times a day., Disp: 30 g, Rfl: 1  •  tretinoin (RETIN-A) 0.025 % cream, Apply to affected area on face/shoulders at bedtime for " acne., Disp: 45 g, Rfl: 3  •  triamcinolone acetonide (KENALOG) 0.1 % Ointment, Apply 1 Application topically 2 times a day. body, Disp: 45 g, Rfl: 1  •  buPROPion (WELLBUTRIN XL) 150 MG XL tablet, Take 1 Tablet by mouth every morning., Disp: 90 Tablet, Rfl: 0  •  clindamycin (CLEOCIN) 1 % Solution, Apply to affected area on face/shoulders once or twice daliy after benzoyl peroxide wash for acne., Disp: 60 mL, Rfl: 3  •  albuterol 108 (90 Base) MCG/ACT Aero Soln inhalation aerosol, Inhale 2 Puffs by mouth every 6 hours as needed for Shortness of Breath., Disp: 8.5 g, Rfl: 11    No Known Allergies    Past Medical History:   Diagnosis Date   • Anemia 2013   • Anxiety 1/23/2018   • Depression    • Dysthymia 11/8/2016   • Infectious mononucleosis    • Nausea and vomiting in pregnancy 2/20/2013     Past Surgical History:   Procedure Laterality Date   • ORIF, FINGER  6/24/2014    Performed by Ed Espitia M.D. at [facility]   • PERCUTANEOUS PINNING  6/24/2014    Performed by Ed Espitia M.D. at [facility]     Family History   Problem Relation Age of Onset   • Cancer Maternal Aunt         breast cancer    • Hyperlipidemia Maternal Grandfather    • Diabetes Maternal Grandfather         diet control   • Hypertension Maternal Grandfather    • Heart Disease Maternal Grandfather    • Lupus Mother    • Rheumatologic Disease Mother    • Hypertension Father    • Diabetes Maternal Uncle    • Lung Disease Paternal Aunt         asthma     Social History     Socioeconomic History   • Marital status: Single     Spouse name: Not on file   • Number of children: Not on file   • Years of education: Not on file   • Highest education level: Associate degree: academic program   Occupational History   • Not on file   Tobacco Use   • Smoking status: Former Smoker     Packs/day: 0.05     Years: 2.00     Pack years: 0.10     Types: Cigarettes     Quit date: 2/20/2011     Years since quitting: 10.9   •  "Smokeless tobacco: Never Used   Vaping Use   • Vaping Use: Never used   Substance and Sexual Activity   • Alcohol use: No   • Drug use: No   • Sexual activity: Yes     Partners: Male     Birth control/protection: Condom     Comment: condom   Other Topics Concern   • Not on file   Social History Narrative   • Not on file     Social Determinants of Health     Financial Resource Strain: Low Risk    • Difficulty of Paying Living Expenses: Not very hard   Food Insecurity: No Food Insecurity   • Worried About Running Out of Food in the Last Year: Never true   • Ran Out of Food in the Last Year: Never true   Transportation Needs: No Transportation Needs   • Lack of Transportation (Medical): No   • Lack of Transportation (Non-Medical): No   Physical Activity: Sufficiently Active   • Days of Exercise per Week: 5 days   • Minutes of Exercise per Session: 30 min   Stress: Stress Concern Present   • Feeling of Stress : To some extent   Social Connections: Socially Isolated   • Frequency of Communication with Friends and Family: More than three times a week   • Frequency of Social Gatherings with Friends and Family: More than three times a week   • Attends Hinduism Services: Never   • Active Member of Clubs or Organizations: No   • Attends Club or Organization Meetings: Never   • Marital Status: Never    Intimate Partner Violence:    • Fear of Current or Ex-Partner: Not on file   • Emotionally Abused: Not on file   • Physically Abused: Not on file   • Sexually Abused: Not on file   Housing Stability: Low Risk    • Unable to Pay for Housing in the Last Year: No   • Number of Places Lived in the Last Year: 1   • Unstable Housing in the Last Year: No       Objective:     Vitals: /60 (BP Location: Right arm, Patient Position: Sitting, BP Cuff Size: Adult)   Pulse 97   Temp 36.3 °C (97.4 °F) (Temporal)   Resp 16   Ht 1.486 m (4' 10.5\")   Wt 77.1 kg (170 lb)   LMP 01/07/2022   SpO2 96%   BMI 34.93 kg/m²  "   General: Alert, pleasant, NAD  HEENT: Normocephalic.  Neck supple.   Respiratory: no distress, no audible wheezing, RR -WNL  Skin: Warm, dry, no rashes.  Extremities: No leg edema. No discoloration  Neurological: No tremors  Psych:  Affect/mood is normal, judgement is good, memory is intact, grooming is appropriate.    Assessment/Plan:     Isa was seen today for other.    Diagnoses and all orders for this visit:    Abnormal urine odor  New problem. No immediate concerns for acute UTI. She is unable to urinate in the clinic therefore have ordered labs for her to complete at the lab. Continue hydration.   -     MICROALBUMIN CREAT RATIO URINE; Future  -     URINE CULTURE(NEW); Future  -     Cancel: URINALYSIS; Future  -     US-RENAL; Future  -     URINALYSIS; Future    Flank pain  Intermittent. No CVA tenderness in clinic. Will obtain KUB as she mentions intermittent flank pain although low suspicion for kidney infection or obstruction.  -     MICROALBUMIN CREAT RATIO URINE; Future  -     URINE CULTURE(NEW); Future  -     Cancel: URINALYSIS; Future  -     US-RENAL; Future  -     URINALYSIS; Future    Return if symptoms worsen or fail to improve, for Lab results.          Tania PLATA

## 2022-01-28 ENCOUNTER — TELEMEDICINE (OUTPATIENT)
Dept: BEHAVIORAL HEALTH | Facility: CLINIC | Age: 27
End: 2022-01-28
Payer: COMMERCIAL

## 2022-01-28 DIAGNOSIS — F34.1 DYSTHYMIA: ICD-10-CM

## 2022-01-28 PROCEDURE — 90837 PSYTX W PT 60 MINUTES: CPT | Mod: 95 | Performed by: MARRIAGE & FAMILY THERAPIST

## 2022-01-28 NOTE — PROGRESS NOTES
[facility]   Therapy Progress Note      This visit was conducted via Zoom using secure and encrypted videoconferencing technology.  The patient was in a private location in the state of [place].  The patient's identity was confirmed and verbal consent was obtained for this virtual visit.  POS 10 -Home during their visit.    Name: Isa Padgett  MRN: [id number]  : 1995  Age: 26 y.o.  Date of assessment: 2022  PCP: BONI Leigh  Persons in attendance: Patient  Total session time: 55 minutes    Objective Observations:   Participation:Active verbal participation, Attentive and Engaged   Grooming:Casual   Cognition:Alert and Fully Oriented   Eye Contact:Good   Mood:Euthymic   Affect:Flexible and Full range   Thought Process:Logical and Goal-directed   Speech:Rate within normal limits and Volume within normal limits    Current Risk:   Suicide: low   Homicide: low   Self-Harm: low   Relapse: low   Safety Plan Reviewed: not applicable    Topics addressed in psychotherapy include: Met with the patient via zoom for an individual counseling session.  The patient reported that she often cares too much or not at all. Discussed her communications regarding a starr get together. Patient reported that she feels like the black sheep of the family. Discussed family relations. Worked with the patient on validation, Worked with patient on self care and self esteem.    Care Plan Updated: No    Does patient express agreement with the above plan? Yes     Diagnosis:  1. Dysthymia        Referral appointment(s) scheduled? No       MARIUM Rico

## 2022-01-31 ENCOUNTER — HOSPITAL ENCOUNTER (OUTPATIENT)
Facility: MEDICAL CENTER | Age: 27
End: 2022-01-31
Payer: COMMERCIAL

## 2022-01-31 LAB — COVID ORDER STATUS COVID19: NORMAL

## 2022-02-01 LAB
SARS-COV-2 RNA RESP QL NAA+PROBE: DETECTED
SPECIMEN SOURCE: ABNORMAL

## 2022-02-04 ENCOUNTER — TELEMEDICINE (OUTPATIENT)
Dept: BEHAVIORAL HEALTH | Facility: CLINIC | Age: 27
End: 2022-02-04
Payer: COMMERCIAL

## 2022-02-04 ENCOUNTER — APPOINTMENT (OUTPATIENT)
Dept: RADIOLOGY | Facility: MEDICAL CENTER | Age: 27
End: 2022-02-04
Attending: NURSE PRACTITIONER
Payer: COMMERCIAL

## 2022-02-04 DIAGNOSIS — F33.2 SEVERE EPISODE OF RECURRENT MAJOR DEPRESSIVE DISORDER, WITHOUT PSYCHOTIC FEATURES (HCC): ICD-10-CM

## 2022-02-04 DIAGNOSIS — G47.33 OSA (OBSTRUCTIVE SLEEP APNEA): ICD-10-CM

## 2022-02-04 DIAGNOSIS — F41.9 ANXIETY: ICD-10-CM

## 2022-02-04 PROCEDURE — 99214 OFFICE O/P EST MOD 30 MIN: CPT | Mod: 95 | Performed by: PSYCHIATRY & NEUROLOGY

## 2022-02-04 PROCEDURE — RXMED WILLOW AMBULATORY MEDICATION CHARGE: Performed by: PSYCHIATRY & NEUROLOGY

## 2022-02-04 RX ORDER — BUPROPION HYDROCHLORIDE 150 MG/1
150 TABLET ORAL EVERY MORNING
Qty: 90 TABLET | Refills: 0 | Status: SHIPPED | OUTPATIENT
Start: 2022-02-04 | End: 2022-03-18 | Stop reason: SDUPTHER

## 2022-02-04 RX ORDER — SERTRALINE HYDROCHLORIDE 100 MG/1
200 TABLET, FILM COATED ORAL DAILY
Qty: 180 TABLET | Refills: 0 | Status: SHIPPED | OUTPATIENT
Start: 2022-02-04 | End: 2022-03-18 | Stop reason: SDUPTHER

## 2022-02-04 RX ORDER — BUPROPION HYDROCHLORIDE 300 MG/1
300 TABLET ORAL EVERY MORNING
Qty: 90 TABLET | Refills: 0 | Status: SHIPPED | OUTPATIENT
Start: 2022-02-04 | End: 2022-03-18 | Stop reason: SDUPTHER

## 2022-02-04 NOTE — PROGRESS NOTES
"[facility]  PSYCHIATRIC FOLLOW-UP NOTE    This evaluation was conducted via Zoom, using secure and encrypted videoconferencing technology. The patient was physical located at their home address in [place], [place], and the physician was located at  her home office in [place], [place]. The patient was presented by self, at home. The patient’s identity was confirmed and verbal consent for the telemedicine encounter was obtained.    CC:  Presents for follow up visit for medication evaluation and management      History Of Present Illness:  Isa Padgett is a 26 y.o. female with a history of MDD, PATIENCE, and insomnia, with comorbid medical problems including GERD, mild obstructive sleep apnea and obesity, presents today for follow up.  She is a former patient of Dr. Cerna who retired.      The patient reported the following:  Things were going better but she started feeling sick last Saturday, a week ago, and tested positive for COVID.  Now things are piling up and this is causing a lot of stress.  She hasn't been taking her medications for several days.  Educated her that she can have w/d symptoms and to please restart.  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      History from 5/ 15/20 visit:  \"The patient says that her mood has been in the middle, averaging a 5 out of 10 with 10 being a great mood, she has been more irritable over the last month.  She notices that she lacks motivation and is not as interested in things as she usually is and this is been going on for about a month.  For example she normally lives make up and she is not interested in wearing it or using it.  She knows she ought to go to the gym and workout or workout in general and she tells herself \"if I do not have to, I do not want to.\"  She says her anxiety has " "been pretty well controlled.  She does not let things bother her as much as she used to.  For example running late used to really upset her and now she is able to let it roll off of her back.  Regarding her insomnia some days she is able to sleep okay and then other days she has a hard time falling asleep and then wakes up during the night.  She drinks 1 cup of coffee at least in the morning.  Educated her about caffeine and that can be in her system for up to 70 hours.\"    Psychiatric History:  Denies any hospitalizations  Denies any history of SI, SA or self harm  Dr. Cerna beginning in May 2019.  She was referred by her primary care physician.  Her last appointment was August 16, 2019.  She saw Dr. DOCKERY approximately 3 times  Medication trials: She is currently taking Zoloft 50 mg and previously tried citalopram and buspirone.  She also has a prescription for Ambien.    Family History:  Brother with anxiety, she denies any family history of bipolar disorder, schizophrenia or suicide attempts.    Social History:  The patient is from [place], she is the oldest of 4 children, her youngest sibling is 12 years old.  Her mother was a single mom and said the patient was always responsible for taking care of her siblings.  She is very protective of her younger siblings.  She says school is okay for her that she did get bullied in elementary school for her weight but she never felt vulnerable but more defensive about this.  She said it got better in middle school.  In high school she was very involved in her schoolwork but she did not like sports.  She works at a [employer] for a while and then she got pregnant with her daughter.  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    Depression " "screening:  Depression Screen (PHQ-2/PHQ-9) 8/7/2020 11/12/2021 1/21/2022   PHQ-2 Total Score 1 - -   PHQ-2 Total Score - - -   PHQ-2 Total Score - 4 0   PHQ-9 Total Score 2 - -   PHQ-9 Total Score - 13 -       Past Medical, Family and Social History reviewed with the patient.    Current medications and allergies reviewed with the patient.    REVIEW OF SYSTEMS:        Constitutional Positive - obesity   Eyes negative   Ears/Nose/Mouth/Throat negative   Cardiovascular negative   Respiratory Positive - GUERA   Gastrointestinal negative   Genitourinary negative   Muscular negative   Integumentary negative   Neurological negative   Endocrine negative   Hematologic/Lymphatic negative       Physical Examination and Psychiatric Mental Status Examination:  Vital signs: LMP 01/07/2022     CONSTITUTIONAL:  General Appearance:  Clean, casual attire, good eye contact, engaged with provider    ORIENTATION:  Oriented to time, place and person  RECENT AND REMOTE MEMORY:  Grossly intact  ATTENTION SPAN AND CONCENTRATION:  within normal range  LANGUAGE:  no deficits appreciated  FUND OF KNOWLEDGE:  has awareness of current events, past history and normal vocabulary  SPEECH:  normal volume, amount, rate and articulation, no perseveration or paucity of language  MOOD:  \"Exhausted\"  AFFECT:  Constricted  THOUGHT PROCESS:  logical and goal directed  THOUGHT CONTENT:  Denies any SI/HI or AVH, no delusional thinking nor preoccupations appreciated  ASSOCIATIONS:  Intact, not loose, no tangentiality or circumstantiality  MEMORY:  No gross evidence of memory deficits  JUDGMENT:  adequate concerning everyday activities  INSIGHT:  adequate to psychiatric condition        DIAGNOSTIC IMPRESSION:  1. Severe episode of recurrent major depressive disorder, without psychotic features (HCC)  - buPROPion (WELLBUTRIN XL) 150 MG XL tablet; Take 1 Tablet by mouth every morning.  Dispense: 90 Tablet; Refill: 0  - buPROPion (WELLBUTRIN XL) 300 MG XL tablet; " Take 1 Tablet by mouth every morning. Combine with 150 mg for a total daily dose of 450 mg.  Dispense: 90 Tablet; Refill: 0  - sertraline (ZOLOFT) 100 MG Tab; Take 2 Tablets by mouth every day.  Dispense: 180 Tablet; Refill: 0    2. Anxiety  - sertraline (ZOLOFT) 100 MG Tab; Take 2 Tablets by mouth every day.  Dispense: 180 Tablet; Refill: 0     Asssessment and Plan:  1. Major depression, recurrent, severe, improving  2. PATIENCE, improving  R/o ADHD  R/o Binge eating disorder  Restart Sertraline 200 mg, was increased from 150 mg at her 11/12/21 visit  Restart Wellbutrin  mg, was increased from 300 mg at her 11/12/21 visit  Continue in individual therapy for MDD, PATIENCE, and help with being assertive, working with Rock Ferraro  Previously reviewed daily intentional breathing practice to reduce anxiety   Screen for binge eating disorder at future appt and consider stimulant if positive  Reviewed previous encounter HPI, histories and treatment plan in preparation for visit     3.  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 Obesity, r/o binge eating disorder    5. Insomnia  Reduce caffeine intake  D/C melatonin 5 mg 1/2 to 1 up to 3 hours before bedtime, didn't help with middle insomnia   Not taking: Trazodone 50 mg, groggy next day but got a good night's rest  Not taking Hydroxyzine Pamoate 25 mg 1 to 3 PRN insomnia or anxiety    Follow up in 6 weeks or call sooner PRN    Risks, benefits, alternatives and side effects were discussed for all medicines prescribed at this visit.  The patient voiced understanding.  The patient agrees to call the clinic with any questions or concerns, or seek emergent medical care if warranted.    The patient has a safety plan that includes calling the 1-800 crisis line number, calling 911 and/or going to the nearest Emergency Department if symptoms worsen.    The proposed treatment plan was discussed  with the patient who was provided the opportunity to ask questions and make suggestions regarding alternative treatment. The patient verbalized understanding and expressed agreement with the plan.      Frances Forbes M.D.    This note was created using voice recognition software (Dragon). The accuracy of the dictation is limited by the abilities of the software. I have reviewed the note prior to signing, however some errors in grammar and context are still possible. If you have any questions related to this note please do not hesitate to contact our office.

## 2022-02-05 PROCEDURE — RXMED WILLOW AMBULATORY MEDICATION CHARGE: Performed by: NURSE PRACTITIONER

## 2022-02-05 PROCEDURE — RXMED WILLOW AMBULATORY MEDICATION CHARGE: Performed by: PSYCHIATRY & NEUROLOGY

## 2022-02-14 ENCOUNTER — PHARMACY VISIT (OUTPATIENT)
Dept: PHARMACY | Facility: MEDICAL CENTER | Age: 27
End: 2022-02-14
Payer: COMMERCIAL

## 2022-03-18 ENCOUNTER — TELEMEDICINE (OUTPATIENT)
Dept: BEHAVIORAL HEALTH | Facility: CLINIC | Age: 27
End: 2022-03-18
Payer: COMMERCIAL

## 2022-03-18 DIAGNOSIS — F41.9 ANXIETY: ICD-10-CM

## 2022-03-18 DIAGNOSIS — G47.00 INSOMNIA, UNSPECIFIED TYPE: ICD-10-CM

## 2022-03-18 DIAGNOSIS — F33.2 SEVERE EPISODE OF RECURRENT MAJOR DEPRESSIVE DISORDER, WITHOUT PSYCHOTIC FEATURES (HCC): ICD-10-CM

## 2022-03-18 PROCEDURE — 99214 OFFICE O/P EST MOD 30 MIN: CPT | Mod: 95 | Performed by: PSYCHIATRY & NEUROLOGY

## 2022-03-18 RX ORDER — BUPROPION HYDROCHLORIDE 150 MG/1
150 TABLET ORAL EVERY MORNING
Qty: 90 TABLET | Refills: 0 | Status: SHIPPED | OUTPATIENT
Start: 2022-03-18 | End: 2022-09-16 | Stop reason: SDUPTHER

## 2022-03-18 RX ORDER — BUPROPION HYDROCHLORIDE 300 MG/1
300 TABLET ORAL EVERY MORNING
Qty: 90 TABLET | Refills: 0 | Status: SHIPPED | OUTPATIENT
Start: 2022-03-18 | End: 2022-09-16 | Stop reason: SDUPTHER

## 2022-03-18 RX ORDER — SERTRALINE HYDROCHLORIDE 100 MG/1
200 TABLET, FILM COATED ORAL DAILY
Qty: 180 TABLET | Refills: 0 | Status: SHIPPED | OUTPATIENT
Start: 2022-03-18 | End: 2022-09-16 | Stop reason: SDUPTHER

## 2022-03-18 NOTE — PROGRESS NOTES
"[facility]  PSYCHIATRIC FOLLOW-UP NOTE    This evaluation was conducted via Zoom, using secure and encrypted videoconferencing technology. The patient was physical located at their home address in [place], [place], and the physician was located at  her home office in [place], [place]. The patient was presented by self, at home. The patient’s identity was confirmed and verbal consent for the telemedicine encounter was obtained.    CC:  Presents for follow up visit for medication evaluation and management      History Of Present Illness:  Isa Padgett is a 26 y.o. female with a history of MDD, PATIENCE, and insomnia, with comorbid medical problems including GERD, mild obstructive sleep apnea and obesity, presents today for follow up.  She is a former patient of Dr. Cerna who retired.      The patient reported the following:  She is feeling much better since her last appointment and since the [facility] message she sent to this MD a couple of days after her last appointment where she and her boyfriend had had a blowup when they both were not feeling well.  She has restarted the Zoloft at 200 mg and worked her way back up to Wellbutrin  mg.  She believes both are working well.  Her anxiety is better and her frustration tolerance is better.  Her mood is also better.  She is less irritable.  She is sleeping well.  She is not having any long-haul Covid symptoms.      History from 5/ 15/20 visit:  \"The patient says that her mood has been in the middle, averaging a 5 out of 10 with 10 being a great mood, she has been more irritable over the last month.  She notices that she lacks motivation and is not as interested in things as she usually is and this is been going on for about a month.  For example she normally lives make up and she is not interested in wearing it or using it.  She knows she ought to go to the gym and workout or workout in general and she tells " "herself \"if I do not have to, I do not want to.\"  She says her anxiety has been pretty well controlled.  She does not let things bother her as much as she used to.  For example running late used to really upset her and now she is able to let it roll off of her back.  Regarding her insomnia some days she is able to sleep okay and then other days she has a hard time falling asleep and then wakes up during the night.  She drinks 1 cup of coffee at least in the morning.  Educated her about caffeine and that can be in her system for up to 70 hours.\"    Psychiatric History:  Denies any hospitalizations  Denies any history of SI, SA or self harm  Dr. Cerna beginning in May 2019.  She was referred by her primary care physician.  Her last appointment was August 16, 2019.  She saw Dr. DOCKERY approximately 3 times  Medication trials: She is currently taking Zoloft 50 mg and previously tried citalopram and buspirone.  She also has a prescription for Ambien.    Family History:  Brother with anxiety, she denies any family history of bipolar disorder, schizophrenia or suicide attempts.    Social History:  The patient is from [place], she is the oldest of 4 children, her youngest sibling is 12 years old.  Her mother was a single mom and said the patient was always responsible for taking care of her siblings.  She is very protective of her younger siblings.  She says school is okay for her that she did get bullied in elementary school for her weight but she never felt vulnerable but more defensive about this.  She said it got better in middle school.  In high school she was very involved in her schoolwork but she did not like sports.  She works at a [email or web address] for a while and then she got pregnant with her daughter.  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    Depression screening:  Depression Screen (PHQ-2/PHQ-9) 8/7/2020 11/12/2021 1/21/2022   PHQ-2 Total Score 1 - -   PHQ-2 Total Score - - -   PHQ-2 Total Score - 4 0   PHQ-9 Total Score 2 - -   PHQ-9 Total Score - 13 -       Past Medical, Family and Social History reviewed with the patient.    Current medications and allergies reviewed with the patient.    REVIEW OF SYSTEMS:        Constitutional Positive - obesity   Eyes negative   Ears/Nose/Mouth/Throat negative   Cardiovascular negative   Respiratory Positive - GUERA   Gastrointestinal negative   Genitourinary negative   Muscular negative   Integumentary negative   Neurological negative   Endocrine negative   Hematologic/Lymphatic negative       Physical Examination and Psychiatric Mental Status Examination:  Vital signs: There were no vitals taken for this visit.    CONSTITUTIONAL:  General Appearance:  Clean, casual attire, good eye contact, engaged with provider    ORIENTATION:  Oriented to time, place and person  RECENT AND REMOTE MEMORY:  Grossly intact  ATTENTION SPAN AND CONCENTRATION:  within normal range  LANGUAGE:  no deficits appreciated  FUND OF KNOWLEDGE:  has awareness of current events, past history and normal vocabulary  SPEECH:  normal volume, amount, rate and articulation, no perseveration or paucity of language  MOOD:  Euthymic  AFFECT:  Full, flexible  THOUGHT PROCESS:  logical and goal directed  THOUGHT CONTENT:  Denies any SI/HI or AVH, no delusional thinking nor preoccupations appreciated  ASSOCIATIONS:  Intact, not loose, no tangentiality or circumstantiality  MEMORY:  No gross evidence of memory deficits  JUDGMENT:  adequate concerning everyday activities  INSIGHT:  adequate to psychiatric condition        DIAGNOSTIC IMPRESSION:  1. Severe episode of recurrent major depressive disorder, without psychotic features (HCC)  - buPROPion (WELLBUTRIN XL) 150 MG XL tablet; Take 1 Tablet by mouth every  morning.  Dispense: 90 Tablet; Refill: 0  - buPROPion (WELLBUTRIN XL) 300 MG XL tablet; Take 1 Tablet by mouth every morning. Combine with 150 mg for a total daily dose of 450 mg.  Dispense: 90 Tablet; Refill: 0  - sertraline (ZOLOFT) 100 MG Tab; Take 2 Tablets by mouth every day.  Dispense: 180 Tablet; Refill: 0    2. Anxiety  - sertraline (ZOLOFT) 100 MG Tab; Take 2 Tablets by mouth every day.  Dispense: 180 Tablet; Refill: 0     Asssessment and Plan:  1. Major depression, recurrent, severe, improving  2. PATIENCE, improving  R/o ADHD  R/o Binge eating disorder  ContinueSertraline 200 mg, was increased from 150 mg at her 11/12/21 visit  Continue Wellbutrin  mg, was increased from 300 mg at her 11/12/21 visit  Continue in individual therapy for MDD, PATIENCE, and help with being assertive, working with Rock Ferraro  Previously reviewed daily intentional breathing practice to reduce anxiety   Screen for binge eating disorder at future appt and consider stimulant if positive  Reviewed previous encounter HPI, histories and treatment plan in preparation for visit  Reviewed recent lab work positive for COVID 1/31/22     3.  GUERA, MILD   She works for [employer] [facility]/sleep medicine and said they are too backlogged at this time, back orders and recalls  4. Obesity, r/o binge eating disorder    5. Insomnia  Continue reduced caffeine intake  Continue melatonin 5 mg 1/2 to 1 up to 3 hours before bedtime, has gummy and it works   Not taking: Trazodone 50 mg, groggy next day but got a good night's rest  Not taking Hydroxyzine Pamoate 25 mg 1 to 3 PRN insomnia or anxiety    Follow up in 4 to 6 months or call sooner PRN    Risks, benefits, alternatives and side effects were discussed for all medicines prescribed at this visit.  The patient voiced understanding.  The patient agrees to call the clinic with any questions or concerns, or seek emergent medical care if warranted.    The patient has a safety plan that includes calling the  [phone] crisis line number, calling 911 and/or going to the nearest Emergency Department if symptoms worsen.    The proposed treatment plan was discussed with the patient who was provided the opportunity to ask questions and make suggestions regarding alternative treatment. The patient verbalized understanding and expressed agreement with the plan.      Frances Forbes M.D.    This note was created using voice recognition software (Dragon). The accuracy of the dictation is limited by the abilities of the software. I have reviewed the note prior to signing, however some errors in grammar and context are still possible. If you have any questions related to this note please do not hesitate to contact our office.

## 2022-03-28 ENCOUNTER — OFFICE VISIT (OUTPATIENT)
Dept: URGENT CARE | Facility: PHYSICIAN GROUP | Age: 27
End: 2022-03-28
Payer: COMMERCIAL

## 2022-03-28 ENCOUNTER — HOSPITAL ENCOUNTER (OUTPATIENT)
Dept: RADIOLOGY | Facility: MEDICAL CENTER | Age: 27
End: 2022-03-28
Attending: NURSE PRACTITIONER
Payer: COMMERCIAL

## 2022-03-28 VITALS
OXYGEN SATURATION: 99 % | TEMPERATURE: 98.3 F | WEIGHT: 170 LBS | HEART RATE: 90 BPM | RESPIRATION RATE: 20 BRPM | BODY MASS INDEX: 34.27 KG/M2 | HEIGHT: 59 IN | SYSTOLIC BLOOD PRESSURE: 110 MMHG | DIASTOLIC BLOOD PRESSURE: 80 MMHG

## 2022-03-28 DIAGNOSIS — R20.2 PARESTHESIA OF THUMB OF LEFT HAND: ICD-10-CM

## 2022-03-28 DIAGNOSIS — M79.644 THUMB PAIN, RIGHT: ICD-10-CM

## 2022-03-28 DIAGNOSIS — R20.2 PARESTHESIA OF THUMB OF RIGHT HAND: ICD-10-CM

## 2022-03-28 DIAGNOSIS — R22.31 LOCALIZED SWELLING OF RIGHT THUMB: ICD-10-CM

## 2022-03-28 PROCEDURE — 99213 OFFICE O/P EST LOW 20 MIN: CPT | Performed by: NURSE PRACTITIONER

## 2022-03-28 PROCEDURE — 73140 X-RAY EXAM OF FINGER(S): CPT | Mod: RT

## 2022-03-28 ASSESSMENT — ENCOUNTER SYMPTOMS
FALLS: 0
SENSORY CHANGE: 1
NECK PAIN: 0
TINGLING: 1
BACK PAIN: 0
MYALGIAS: 1
FOCAL WEAKNESS: 1

## 2022-03-28 NOTE — PROGRESS NOTES
Subjective:     Isa Padgett is a 26 y.o. female who presents for Hand Injury (X last night numbness on right hand, cant feel her fingers)      Hand Injury  Associated symptoms include myalgias. Pertinent negatives include no neck pain.     Pt presents for evaluation of a new problem. Isa is a very pleasant 26-year-old female presents to urgent care today with swelling right thumb that started last night after an acute injury.  She states that she was feeding her dog who was in the crate when he became very angry and tried to bite her.  Out of instinct, she immediately pulled her right hand back and hit it on the top of the dog crate.  She did develop mild pain after this.  This morning however, all of the fingers in her right hand became numb and her swelling of her left thumb worsened.  Her numbness and tingling does spread up her wrist.  She has been using a thumb splint for relief of her symptoms.  This does provide mild relief.  Her pain is rated as mild.  She notes worsening symptoms when trying to type at work.  She has also been using ice and elevation for her symptoms.  She does suffer from hand tremors which is an ongoing issue.    Review of Systems   Musculoskeletal: Positive for myalgias. Negative for back pain, falls, joint pain and neck pain.   Neurological: Positive for tingling, sensory change and focal weakness.       PMH:   Past Medical History:   Diagnosis Date   • Anemia 2013   • Anxiety 1/23/2018   • Depression    • Dysthymia 11/8/2016   • Infectious mononucleosis    • Nausea and vomiting in pregnancy 2/20/2013     ALLERGIES: No Known Allergies  SURGHX:   Past Surgical History:   Procedure Laterality Date   • ORIF, FINGER  6/24/2014    Performed by Ed [facility] M[facility] at [facility]   • PERCUTANEOUS PINNING  6/24/2014    Performed by Ed Espitia M.D. at [facility]     SOCHX:   Social History     Socioeconomic History   • Marital status: Single  "  • Highest education level: Associate degree: academic program   Tobacco Use   • Smoking status: Former Smoker     Packs/day: 0.05     Years: 2.00     Pack years: 0.10     Types: Cigarettes     Quit date: 2011     Years since quittin.1   • Smokeless tobacco: Never Used   Vaping Use   • Vaping Use: Never used   Substance and Sexual Activity   • Alcohol use: No   • Drug use: No   • Sexual activity: Yes     Partners: Male     Birth control/protection: Condom     Comment: condom     FH:   Family History   Problem Relation Age of Onset   • Cancer Maternal Aunt         breast cancer    • Hyperlipidemia Maternal Grandfather    • Diabetes Maternal Grandfather         diet control   • Hypertension Maternal Grandfather    • Heart Disease Maternal Grandfather    • Lupus Mother    • Rheumatologic Disease Mother    • Hypertension Father    • Diabetes Maternal Uncle    • Lung Disease Paternal Aunt         asthma         Objective:   /80 (BP Location: Left arm, Patient Position: Sitting, BP Cuff Size: Large adult)   Pulse 90   Temp 36.8 °C (98.3 °F) (Temporal)   Resp 20   Ht 1.499 m (4' 11\")   Wt 77.1 kg (170 lb)   LMP 2022 (Within Days)   SpO2 99%   BMI 34.34 kg/m²     Physical Exam  Vitals and nursing note reviewed.   Constitutional:       General: She is not in acute distress.     Appearance: Normal appearance. She is not ill-appearing.   HENT:      Head: Normocephalic and atraumatic.      Right Ear: External ear normal.      Left Ear: External ear normal.      Nose: No congestion or rhinorrhea.      Mouth/Throat:      Mouth: Mucous membranes are moist.   Eyes:      Extraocular Movements: Extraocular movements intact.      Pupils: Pupils are equal, round, and reactive to light.   Cardiovascular:      Rate and Rhythm: Normal rate and regular rhythm.      Pulses: Normal pulses.      Heart sounds: Normal heart sounds.   Pulmonary:      Effort: Pulmonary effort is normal.      Breath sounds: Normal " breath sounds.   Abdominal:      General: Abdomen is flat. Bowel sounds are normal.      Palpations: Abdomen is soft.      Tenderness: There is no abdominal tenderness. There is no right CVA tenderness or left CVA tenderness.   Musculoskeletal:         General: Normal range of motion.      Right hand: Decreased strength of finger abduction. Normal strength of thumb/finger opposition and wrist extension. Decreased sensation.      Cervical back: Normal range of motion and neck supple.      Comments: Positive for focal swelling and ecchymosis to palmar and dorsal aspect of right thumb.  Negative for changes with range of motion.  Strength of right hand 2/5 strength of left hand 5/5.    Skin:     General: Skin is warm and dry.      Capillary Refill: Capillary refill takes less than 2 seconds.   Neurological:      General: No focal deficit present.      Mental Status: She is alert and oriented to person, place, and time. Mental status is at baseline.   Psychiatric:         Mood and Affect: Mood normal.         Behavior: Behavior normal.         Thought Content: Thought content normal.         Judgment: Judgment normal.         Assessment/Plan:   Assessment      1. Paresthesia of thumb of right hand  DX-HAND 3+ RIGHT   2. Localized swelling of right thumb  DX-HAND 3+ RIGHT   3. Thumb pain, right  DX-HAND 3+ RIGHT     Outpatient x-ray ordered as x-ray is not available in clinic today.  I will notify her of results.  Due to weakness and paresthesia she was referred to follow-up with hand surgery for further evaluation and treatment.  Patient to continue wearing thumb spica brace, rest, elevate and ice.  AVS handout given and reviewed with patient. Pt educated on red flags and when to seek treatment back in ER or UC.

## 2022-03-28 NOTE — PATIENT INSTRUCTIONS
Paresthesia  Paresthesia is an abnormal burning or prickling sensation. It is usually felt in the hands, arms, legs, or feet. However, it may occur in any part of the body. Usually, paresthesia is not painful. It may feel like:  · Tingling or numbness.  · Buzzing.  · Itching.  Paresthesia may occur without any clear cause, or it may be caused by:  · Breathing too quickly (hyperventilation).  · Pressure on a nerve.  · An underlying medical condition.  · Side effects of a medication.  · Nutritional deficiencies.  · Exposure to toxic chemicals.  Most people experience temporary (transient) paresthesia at some time in their lives. For some people, it may be long-lasting (chronic) because of an underlying medical condition. If you have paresthesia that lasts a long time, you may need to be evaluated by your health care provider.  Follow these instructions at home:  Alcohol use    · Do not drink alcohol if:  ? Your health care provider tells you not to drink.  ? You are pregnant, may be pregnant, or are planning to become pregnant.  · If you drink alcohol:  ? Limit how much you use to:  § 0-1 drink a day for women.  § 0-2 drinks a day for men.  ? Be aware of how much alcohol is in your drink. In the [place] one drink equals one 12 oz bottle of beer (355 mL), one 5 oz glass of wine (148 mL), or one 1½ oz glass of hard liquor (44 mL).  Nutrition    · Eat a healthy diet. This includes:  ? Eating foods that are high in fiber, such as fresh fruits and vegetables, whole grains, and beans.  ? Limiting foods that are high in fat and processed sugars, such as fried or sweet foods.  General instructions  · Take over-the-counter and prescription medicines only as told by your health care provider.  · Do not use any products that contain nicotine or tobacco, such as cigarettes and e-cigarettes. These can keep blood from reaching damaged nerves. If you need help quitting, ask your health care provider.  · If you have diabetes, work  closely with your health care provider to keep your blood sugar under control.  · If you have numbness in your feet:  ? Check every day for signs of injury or infection. Watch for redness, warmth, and swelling.  ? Wear padded socks and comfortable shoes. These help protect your feet.  · Keep all follow-up visits as told by your health care provider. This is important.  Contact a health care provider if you:  · Have paresthesia that gets worse or does not go away.  · Have a burning or prickling feeling that gets worse when you walk.  · Have pain, cramps, or dizziness.  · Develop a rash.  Get help right away if you:  · Feel weak.  · Have trouble walking or moving.  · Have problems with speech, understanding, or vision.  · Feel confused.  · Cannot control your bladder or bowel movements.  · Have numbness after an injury.  · Develop new weakness in an arm or leg.  · Faint.  Summary  · Paresthesia is an abnormal burning or prickling sensation that is usually felt in the hands, arms, legs, or feet. It may also occur in other parts of the body.  · Paresthesia may occur without any clear cause, or it may be caused by breathing too quickly (hyperventilation), pressure on a nerve, an underlying medical condition, side effects of a medication, nutritional deficiencies, or exposure to toxic chemicals.  · If you have paresthesia that lasts a long time, you may need to be evaluated by your health care provider.  This information is not intended to replace advice given to you by your health care provider. Make sure you discuss any questions you have with your health care provider.  Document Released: 12/08/2003 Document Revised: 01/13/2020 Document Reviewed: 12/27/2018  Elsevier Patient Education © 2020 [employer]  Intermetacarpal Sprain    An intermetacarpal sprain happens when ligaments between bones in the hand (metacarpal bones) become overstretched or torn (ruptured). Ligaments are tissues that connect bones to each other.  A sprain usually happens because of an injury to the hand.  Intermetacarpal sprains range from mild to severe. With proper treatment, these injuries may take 2-12 weeks to heal.  What are the causes?  This injury is caused by too much pressure or strain (stress) being applied to the intermetacarpal ligaments. This often happens because of a hard, direct hit or injury (trauma) to the hand.  What increases the risk?  The following factors may make you more likely to develop this injury:  · A previous hand injury.  · Doing repetitive motions with your hands, such as movements in sports or heavy labor.  · Having poor strength and flexibility in your hands.  What are the signs or symptoms?  Symptoms of this injury may include:  · A feeling of popping or tearing inside the hand.  · Pain and swelling, especially in the knuckles.  · Bruising.  · Limited range of motion of the hand.  How is this diagnosed?  This injury is diagnosed based on a physical exam and your medical history. You may have X-rays to check for breaks (fractures) in your bones.  Your sprain may be rated in degrees, based on how severe it is. The ratings include:  · First-degree. A ligament is stretched, but it still has its normal shape.  · Second-degree. A ligament is partially ruptured, and you may have some difficulty moving your hand normally.  · Third-degree. A ligament is completely ruptured, and you may not be able to move the affected hand.  How is this treated?  This injury is treated by resting, icing, applying pressure to (compression), and raising (elevating) the injured area. This is called RICE therapy. Depending on the severity of your sprain, treatment may also include:  · Medicines that help to relieve pain.  · Keeping your hand in a fixed position (immobilization) for a period of time. This may be done using a bandage (dressing), a cast, or a splint.  · Exercises to strengthen and stretch your hand. You may be referred to a physical  therapist.  · Surgery.  Follow these instructions at home:  If you have a cast:  · Do not stick anything inside the cast to scratch your skin. Doing that increases your risk of infection.  · Check the skin around the cast every day. Tell your health care provider about any concerns.  · You may put lotion on dry skin around the edges of the cast. Do not put lotion on the skin underneath the cast.  · Keep the cast clean and dry.  If you have a splint:  · Wear the splint as told by your health care provider. Remove it only as told by your health care provider.  · Loosen the splint if your fingers tingle, become numb, or turn cold and blue.  · Keep the splint clean and dry.  Bathing  · If you have a cast, splint, or dressing, do not take baths, swim, or use a hot tub until your health care provider approves.  · If the cast, splint, or dressing is not waterproof:  ? Do not let it get wet.  ? Cover it with a watertight covering when you take a bath or shower.  Managing pain, stiffness, and swelling    · If directed, put ice on the injured area.  ? If you have a removable splint, remove it as told by your health care provider.  ? Put ice in a plastic bag.  ? Place a towel between your skin and the bag.  ? Leave the ice on for 20 minutes, 2-3 times a day.  · Move your fingers often to reduce stiffness and swelling.  · Elevate your hand above the level of your heart while you are sitting or lying down.  · Wear a compression wrap only as told by your health care provider.  Activity  · Return to your normal activities as told by your health care provider. Ask your health care provider what activities are safe for you.  · Avoid activities that cause pain or make your condition worse.  · Ask your health care provider when it is safe to drive if you have a cast or splint on your hand.  · Do exercises as told by your health care provider or physical therapist.  General instructions  · Take over-the-counter and prescription  medicines only as told by your health care provider.  · Ask your health care provider if the medicine prescribed to you:  ? Requires you to avoid driving or using heavy machinery.  ? Can cause constipation. You may need to take actions to prevent or treat constipation, such as:  § Drink enough fluid to keep your urine pale yellow.  § Take over-the-counter or prescription medicines.  § Eat foods that are high in fiber, such as beans, whole grains, and fresh fruits and vegetables.  § Limit foods that are high in fat and processed sugars, such as fried or sweet foods.  · If you have a cast or a splint, do not put pressure on any part of the cast or splint until it is fully hardened. This may take several hours.  · Do not wear rings on the fingers of your injured hand.  · Keep all follow-up visits as told by your health care provider. This is important.  Contact a health care provider if:  · You have symptoms that do not get better after 2 weeks of treatment.  · You have increased redness, swelling, or pain in your injured area.  · You have a fever.  · Your cast or splint gets damaged.  Get help right away if:  · You have severe pain.  · You develop numbness in your hand or fingers.  · You cannot move your hand or fingers.  · Your hand or fingers feel unusually cold.  · Your hand or fingers turn blue.  · Your fingernails turn a dark color, such as blue or gray.  Summary  · An intermetacarpal sprain happens when ligaments between bones in the hand (metacarpal bones) become overstretched or torn.  · This injury often happens because of a hard, direct hit or injury to the hand.  · Treatment includes resting, icing, applying pressure to (compression), and raising (elevating) the injured area.  · Immobilization and medicines may also be needed.  This information is not intended to replace advice given to you by your health care provider. Make sure you discuss any questions you have with your health care provider.  Document  Released: 12/18/2006 Document Revised: 11/19/2019 Document Reviewed: 11/19/2019  Elsevier Patient Education © 2020 [employer]vier Inc[employer]

## 2022-03-28 NOTE — LETTER
March 28, 2022    To Whom It May Concern:         This is confirmation that Isa PEREZ Andrae Pinaas attended her scheduled appointment with BONI Fernando on 3/28/22.   Please excuse her absence today due to an acute injury.        If you have any questions please do not hesitate to call me at the phone number listed below.    Sincerely,          ELISSA Fernando.  [phone]

## 2022-04-01 ENCOUNTER — TELEMEDICINE (OUTPATIENT)
Dept: BEHAVIORAL HEALTH | Facility: CLINIC | Age: 27
End: 2022-04-01
Payer: COMMERCIAL

## 2022-04-01 DIAGNOSIS — F34.1 DYSTHYMIA: ICD-10-CM

## 2022-04-01 PROCEDURE — 90837 PSYTX W PT 60 MINUTES: CPT | Mod: 95 | Performed by: MARRIAGE & FAMILY THERAPIST

## 2022-04-01 NOTE — PROGRESS NOTES
[facility]   Therapy Progress Note      This visit was conducted via Zoom using secure and encrypted videoconferencing technology.  The patient was in a private location in the state [facility].  The patient's identity was confirmed and verbal consent was obtained for this virtual visit.  Place of Service: POS 02 - Virtual visits from a location other than the patient's Home    Name: Isa Padgett  MRN: [id number]  : 1995  Age: 26 y.o.  Date of assessment: 2022  PCP: BONI Leigh  Persons in attendance: Patient  Total session time: 55 minutes    Objective Observations:   Participation:Active verbal participation, Attentive and Engaged   Grooming:Adequate and Casual   Cognition:Alert and Fully Oriented   Eye Contact:Good   Mood:Euthymic and Happy   Affect:Flexible and Full range   Thought Process:Logical and Goal-directed   Speech:Rate within normal limits and Volume within normal limits    Current Risk:   Suicide: low   Homicide: low   Self-Harm: low   Relapse: low   Safety Plan Reviewed: not applicable    Topics addressed in psychotherapy include: Met with the patient via zoom for an individual counseling session. Patient reported that she had covid and had an argument with her boyfriend. Patient reported that she had a confrontation at her sister in laws house with another parent. It appears that the patient handled the situation well.  Worked with the patient on continuing emotional regulation skills.  Discussed anger triggers and ways to minimize the response.  Discussed automatic negative thoughts.    This dictation has been created using voice recognition software and/or scribes. The accuracy of the dictation is limited by the abilities of the software and the expertise of the scribes. I expect there may be some errors of grammar and possibly content. I made every attempt to manually correct the errors within my dictation. However, errors related to voice  recognition software and/or scribes may still exist and should be interpreted within the appropriate context.    Care Plan Updated: No    Does patient express agreement with the above plan? Yes     Diagnosis:  1. Dysthymia        Referral appointment(s) scheduled? No       MARITZA Rico.

## 2022-04-15 ENCOUNTER — HOSPITAL ENCOUNTER (OUTPATIENT)
Dept: RADIOLOGY | Facility: MEDICAL CENTER | Age: 27
End: 2022-04-15
Attending: NURSE PRACTITIONER
Payer: COMMERCIAL

## 2022-04-15 ENCOUNTER — HOSPITAL ENCOUNTER (OUTPATIENT)
Dept: RADIOLOGY | Facility: MEDICAL CENTER | Age: 27
End: 2022-04-15
Attending: INTERNAL MEDICINE
Payer: COMMERCIAL

## 2022-04-15 DIAGNOSIS — R07.9 CHEST PAIN, UNSPECIFIED TYPE: ICD-10-CM

## 2022-04-15 DIAGNOSIS — R82.90 ABNORMAL URINE ODOR: ICD-10-CM

## 2022-04-15 DIAGNOSIS — R10.9 FLANK PAIN: ICD-10-CM

## 2022-04-15 DIAGNOSIS — R06.02 SHORTNESS OF BREATH: ICD-10-CM

## 2022-04-15 PROCEDURE — 71046 X-RAY EXAM CHEST 2 VIEWS: CPT

## 2022-04-15 PROCEDURE — 76775 US EXAM ABDO BACK WALL LIM: CPT

## 2022-04-20 ENCOUNTER — PHARMACY VISIT (OUTPATIENT)
Dept: PHARMACY | Facility: MEDICAL CENTER | Age: 27
End: 2022-04-20
Payer: COMMERCIAL

## 2022-04-20 PROCEDURE — RXMED WILLOW AMBULATORY MEDICATION CHARGE: Performed by: NURSE PRACTITIONER

## 2022-05-13 ENCOUNTER — HOSPITAL ENCOUNTER (OUTPATIENT)
Dept: LAB | Facility: MEDICAL CENTER | Age: 27
End: 2022-05-13
Attending: NURSE PRACTITIONER
Payer: COMMERCIAL

## 2022-05-13 DIAGNOSIS — R76.8 ANTI-TPO ANTIBODIES PRESENT: ICD-10-CM

## 2022-05-13 DIAGNOSIS — R68.89 COLD INTOLERANCE: ICD-10-CM

## 2022-05-13 DIAGNOSIS — R10.9 FLANK PAIN: ICD-10-CM

## 2022-05-13 DIAGNOSIS — R82.90 ABNORMAL URINE ODOR: ICD-10-CM

## 2022-05-13 DIAGNOSIS — R76.8 POSITIVE ANA (ANTINUCLEAR ANTIBODY): ICD-10-CM

## 2022-05-13 LAB
APPEARANCE UR: CLEAR
BACTERIA #/AREA URNS HPF: ABNORMAL /HPF
BILIRUB UR QL STRIP.AUTO: NEGATIVE
COLOR UR: YELLOW
CREAT UR-MCNC: 22.14 MG/DL
EPI CELLS #/AREA URNS HPF: NEGATIVE /HPF
ERYTHROCYTE [DISTWIDTH] IN BLOOD BY AUTOMATED COUNT: 43.6 FL (ref 35.9–50)
GLUCOSE UR STRIP.AUTO-MCNC: NEGATIVE MG/DL
HCT VFR BLD AUTO: 43.1 % (ref 37–47)
HGB BLD-MCNC: 14.4 G/DL (ref 12–16)
HYALINE CASTS #/AREA URNS LPF: ABNORMAL /LPF
KETONES UR STRIP.AUTO-MCNC: NEGATIVE MG/DL
LEUKOCYTE ESTERASE UR QL STRIP.AUTO: ABNORMAL
MCH RBC QN AUTO: 30.6 PG (ref 27–33)
MCHC RBC AUTO-ENTMCNC: 33.4 G/DL (ref 33.6–35)
MCV RBC AUTO: 91.5 FL (ref 81.4–97.8)
MICRO URNS: ABNORMAL
MICROALBUMIN UR-MCNC: <1.2 MG/DL
MICROALBUMIN/CREAT UR: NORMAL MG/G (ref 0–30)
NITRITE UR QL STRIP.AUTO: POSITIVE
PH UR STRIP.AUTO: 6.5 [PH] (ref 5–8)
PLATELET # BLD AUTO: 242 K/UL (ref 164–446)
PMV BLD AUTO: 9.8 FL (ref 9–12.9)
PROT UR QL STRIP: NEGATIVE MG/DL
RBC # BLD AUTO: 4.71 M/UL (ref 4.2–5.4)
RBC # URNS HPF: ABNORMAL /HPF
RBC UR QL AUTO: NEGATIVE
SP GR UR STRIP.AUTO: 1.01
THYROPEROXIDASE AB SERPL-ACNC: 9 IU/ML (ref 0–9)
TSH SERPL DL<=0.005 MIU/L-ACNC: 1.6 UIU/ML (ref 0.38–5.33)
UROBILINOGEN UR STRIP.AUTO-MCNC: 0.2 MG/DL
WBC # BLD AUTO: 7.1 K/UL (ref 4.8–10.8)
WBC #/AREA URNS HPF: ABNORMAL /HPF

## 2022-05-13 PROCEDURE — 86376 MICROSOMAL ANTIBODY EACH: CPT

## 2022-05-13 PROCEDURE — 86225 DNA ANTIBODY NATIVE: CPT

## 2022-05-13 PROCEDURE — 82570 ASSAY OF URINE CREATININE: CPT

## 2022-05-13 PROCEDURE — 87186 SC STD MICRODIL/AGAR DIL: CPT

## 2022-05-13 PROCEDURE — 86039 ANTINUCLEAR ANTIBODIES (ANA): CPT

## 2022-05-13 PROCEDURE — 85027 COMPLETE CBC AUTOMATED: CPT

## 2022-05-13 PROCEDURE — 84443 ASSAY THYROID STIM HORMONE: CPT

## 2022-05-13 PROCEDURE — 86235 NUCLEAR ANTIGEN ANTIBODY: CPT | Mod: 91

## 2022-05-13 PROCEDURE — 82043 UR ALBUMIN QUANTITATIVE: CPT

## 2022-05-13 PROCEDURE — 87077 CULTURE AEROBIC IDENTIFY: CPT

## 2022-05-13 PROCEDURE — 87086 URINE CULTURE/COLONY COUNT: CPT

## 2022-05-13 PROCEDURE — 86038 ANTINUCLEAR ANTIBODIES: CPT

## 2022-05-13 PROCEDURE — 81001 URINALYSIS AUTO W/SCOPE: CPT

## 2022-05-13 PROCEDURE — 36415 COLL VENOUS BLD VENIPUNCTURE: CPT

## 2022-05-15 LAB
BACTERIA UR CULT: ABNORMAL
BACTERIA UR CULT: ABNORMAL
SIGNIFICANT IND 70042: ABNORMAL
SITE SITE: ABNORMAL
SOURCE SOURCE: ABNORMAL

## 2022-05-16 DIAGNOSIS — N30.00 ACUTE CYSTITIS WITHOUT HEMATURIA: ICD-10-CM

## 2022-05-16 DIAGNOSIS — R82.90 ABNORMAL URINALYSIS: ICD-10-CM

## 2022-05-16 LAB — NUCLEAR IGG SER QL IA: DETECTED

## 2022-05-16 PROCEDURE — RXMED WILLOW AMBULATORY MEDICATION CHARGE: Performed by: NURSE PRACTITIONER

## 2022-05-16 RX ORDER — SULFAMETHOXAZOLE AND TRIMETHOPRIM 800; 160 MG/1; MG/1
1 TABLET ORAL 2 TIMES DAILY
Qty: 6 TABLET | Refills: 0 | Status: SHIPPED | OUTPATIENT
Start: 2022-05-16 | End: 2022-05-27

## 2022-05-18 LAB
ANA INTERPRETIVE COMMENT Q5143: ABNORMAL
ANA PATTERN Q5144: ABNORMAL
ANA TITER Q5145: ABNORMAL
ANTINUCLEAR ANTIBODY (ANA), HEP-2, IGG Q5142: DETECTED
ENA JO1 AB TITR SER: 0 AU/ML (ref 0–40)
ENA SCL70 IGG SER QL: 2 AU/ML (ref 0–40)
ENA SM IGG SER-ACNC: 1 AU/ML (ref 0–40)
ENA SS-B IGG SER IA-ACNC: 0 AU/ML (ref 0–40)
SSA52 R0ENA AB IGG Q0420: 4 AU/ML (ref 0–40)
SSA60 R0ENA AB IGG Q0419: 0 AU/ML (ref 0–40)

## 2022-05-19 LAB
DSDNA AB TITR SER CLIF: 8 IU (ref 0–24)
U1 SNRNP IGG SER QL: 1 UNITS (ref 0–19)

## 2022-05-23 ENCOUNTER — PHARMACY VISIT (OUTPATIENT)
Dept: PHARMACY | Facility: MEDICAL CENTER | Age: 27
End: 2022-05-23
Payer: COMMERCIAL

## 2022-05-23 PROCEDURE — RXOTC WILLOW AMBULATORY OTC CHARGE

## 2022-05-27 ENCOUNTER — OFFICE VISIT (OUTPATIENT)
Dept: MEDICAL GROUP | Facility: MEDICAL CENTER | Age: 27
End: 2022-05-27
Payer: COMMERCIAL

## 2022-05-27 VITALS
DIASTOLIC BLOOD PRESSURE: 76 MMHG | OXYGEN SATURATION: 95 % | TEMPERATURE: 96.9 F | WEIGHT: 175.27 LBS | BODY MASS INDEX: 35.33 KG/M2 | SYSTOLIC BLOOD PRESSURE: 114 MMHG | HEART RATE: 101 BPM | HEIGHT: 59 IN

## 2022-05-27 DIAGNOSIS — R68.89 COLD INTOLERANCE: ICD-10-CM

## 2022-05-27 DIAGNOSIS — R76.8 ANA POSITIVE: ICD-10-CM

## 2022-05-27 DIAGNOSIS — R53.83 OTHER FATIGUE: ICD-10-CM

## 2022-05-27 DIAGNOSIS — M25.50 POLYARTHRALGIA: ICD-10-CM

## 2022-05-27 DIAGNOSIS — R00.0 RACING HEART BEAT: ICD-10-CM

## 2022-05-27 PROCEDURE — 99213 OFFICE O/P EST LOW 20 MIN: CPT | Performed by: NURSE PRACTITIONER

## 2022-05-27 ASSESSMENT — FIBROSIS 4 INDEX: FIB4 SCORE: 0.42

## 2022-05-27 NOTE — PROGRESS NOTES
Chief Complaint   Patient presents with   • Lab Results     Subjective:     HPI:     Isa Padgett is a 26 y.o. female   here to discuss the evaluation and management of:     Presents to review labs.  She continues to present with cold intolerance, occasional skin issues, fatigue. She continues to have positive NATIVIDAD with positive titer. About one year ago had a very low positive for TPO. Negative on recent lab.    She tells me if she is exposed to cold she will have a numbing sensation and then have itching. No rash.   Symptoms will not resolve until she warms up.   She has excessive fatigue.   She does have sleep apnea.  She has joint pain in her knees and her hands.   Her mother has Lupus and RA    Heart racing  Heart racing noticed more after COVID.   Highest 160.   No palpations.   No CP.  She was concerned about this.   She did have symptoms of acute cystitis at her last visit in January but only recently completed her urine labs in which confirmed positive culture. Consider correlation.     ROS:  Denies any Headache, Blurred Vision, Confusion, Chest pain,  Shortness of breath,  Abdominal pain, Changes of bowel or bladder, Lower ext edema, Fevers, Nights sweats, Weight Changes, Focal weakness or numbness.  And all other systems reviewed and are all negative. POSITIVE FOR : see above        Current Outpatient Medications:   •  buPROPion (WELLBUTRIN XL) 150 MG XL tablet, Take 1 Tablet by mouth every morning., Disp: 90 Tablet, Rfl: 0  •  buPROPion (WELLBUTRIN XL) 300 MG XL tablet, Take 1 Tablet by mouth every morning. Combine with 150 mg for a total daily dose of 450 mg., Disp: 90 Tablet, Rfl: 0  •  sertraline (ZOLOFT) 100 MG Tab, Take 2 Tablets by mouth every day., Disp: 180 Tablet, Rfl: 0  •  omeprazole (PRILOSEC) 40 MG delayed-release capsule, Take 1 Capsule by mouth every day., Disp: 90 Capsule, Rfl: 3  •  hydrOXYzine pamoate (VISTARIL) 25 MG Cap, Take 1 to 3 tablets by mouth once a day as need for  insomnia or anxiety, Disp: 90 Capsule, Rfl: 3  •  tacrolimus (PROTOPIC) 0.1 % Ointment, Apply sparingly to affected area on face 2 times a day., Disp: 30 g, Rfl: 1  •  clindamycin (CLEOCIN) 1 % Solution, Apply to affected area on face/shoulders once or twice daliy after benzoyl peroxide wash for acne. (Patient taking differently: Apply to affected area on face/shoulders once or twice daliy after benzoyl peroxide wash for acne.), Disp: 60 mL, Rfl: 3  •  tretinoin (RETIN-A) 0.025 % cream, Apply to affected area on face/shoulders at bedtime for acne. (Patient taking differently: Apply to affected area on face/shoulders at bedtime for acne.), Disp: 45 g, Rfl: 3  •  triamcinolone acetonide (KENALOG) 0.1 % Ointment, Apply 1 Application topically 2 times a day. body, Disp: 45 g, Rfl: 1  •  albuterol 108 (90 Base) MCG/ACT Aero Soln inhalation aerosol, Inhale 2 Puffs by mouth every 6 hours as needed for Shortness of Breath., Disp: 8.5 g, Rfl: 11    No Known Allergies    Past Medical History:   Diagnosis Date   • Anemia 2013   • Anxiety 1/23/2018   • Depression    • Dysthymia 11/8/2016   • Infectious mononucleosis    • Nausea and vomiting in pregnancy 2/20/2013     Past Surgical History:   Procedure Laterality Date   • ORIF, FINGER  6/24/2014    Performed by Ed Espitia M.D. at SURGERY [facility]   • PERCUTANEOUS PINNING  6/24/2014    Performed by Ed Espitia M.D. at [facility]     Family History   Problem Relation Age of Onset   • Cancer Maternal Aunt         breast cancer    • Hyperlipidemia Maternal Grandfather    • Diabetes Maternal Grandfather         diet control   • Hypertension Maternal Grandfather    • Heart Disease Maternal Grandfather    • Lupus Mother    • Rheumatologic Disease Mother    • Hypertension Father    • Diabetes Maternal Uncle    • Lung Disease Paternal Aunt         asthma     Social History     Socioeconomic History   • Marital status: Single     Spouse name: Not on  "file   • Number of children: Not on file   • Years of education: Not on file   • Highest education level: Associate degree: academic program   Occupational History   • Not on file   Tobacco Use   • Smoking status: Former Smoker     Packs/day: 0.05     Years: 2.00     Pack years: 0.10     Types: Cigarettes     Quit date: 2011     Years since quittin.2   • Smokeless tobacco: Never Used   Vaping Use   • Vaping Use: Never used   Substance and Sexual Activity   • Alcohol use: No   • Drug use: No   • Sexual activity: Yes     Partners: Male     Birth control/protection: Condom     Comment: condom   Other Topics Concern   • Not on file   Social History Narrative   • Not on file     Social Determinants of Health     Financial Resource Strain: Not on file   Food Insecurity: Not on file   Transportation Needs: Not on file   Physical Activity: Not on file   Stress: Not on file   Social Connections: Not on file   Intimate Partner Violence: Not on file   Housing Stability: Not on file       Objective:     Vitals: /76   Pulse (!) 101   Temp 36.1 °C (96.9 °F) (Temporal)   Ht 1.499 m (4' 11\")   Wt 79.5 kg (175 lb 4.3 oz)   SpO2 95%   BMI 35.40 kg/m²    General: Alert, pleasant, NAD  HEENT: Normocephalic.  Neck supple.   Respiratory: no distress, no audible wheezing, RR -WNL  Skin: Warm, dry, no rashes.  Extremities: No leg edema. No discoloration  Neurological: No tremors  Psych:  Affect/mood is normal, judgement is good, memory is intact, grooming is appropriate.    Assessment/Plan:     Isa was seen today for lab results.    Diagnoses and all orders for this visit:    Racing heart beat  Acute problem. Stable. No pain or palpitations. Noticeable after COVID 2022. Labs essentially unremarkable aside from +NATIVIDAD. Recommend Zio patch to evaluate further. Have also discussed it could have been 2/2 untreated UTI.  -     [facility] ZIO PATCH MONITOR; Future    NATIVIDAD positive  Present. Having cold intolerance, fatigue, joint " pain. Titer is lower than previous titer level. Mother hx of Lupus and RA. Recent titer was lower.  Recommend referral to Rheumatology if not able to see Rheumatology consider Endocrinology as last year she did have very low positive TPO level (10.0) now it is below normal levels (9.0). Uncertain significance at this time of these results.   -     Referral to Rheumatology    Other fatigue  Polyarthralgia  Cold intolerance  Chronic symptoms. +NATIVIDAD. History of negative RA 3/2021. TSH WNL. CBC -WNL, Uncertain etiology of her symptoms. Consider multifactorial including depression, underlying autoimmune disorder, sleep apnea as well. Continue to monitor for now and await if Rheumatology has insight, if not again consider Endocrinology.       Return in about 6 months (around 11/27/2022), or if symptoms worsen or fail to improve.          Tania BOWERS.

## 2022-06-14 PROCEDURE — RXMED WILLOW AMBULATORY MEDICATION CHARGE: Performed by: PSYCHIATRY & NEUROLOGY

## 2022-06-16 ENCOUNTER — PHARMACY VISIT (OUTPATIENT)
Dept: PHARMACY | Facility: MEDICAL CENTER | Age: 27
End: 2022-06-16
Payer: COMMERCIAL

## 2022-06-17 ENCOUNTER — NON-PROVIDER VISIT (OUTPATIENT)
Dept: CARDIOLOGY | Facility: MEDICAL CENTER | Age: 27
End: 2022-06-17
Payer: COMMERCIAL

## 2022-06-17 DIAGNOSIS — R00.0 RACING HEART BEAT: ICD-10-CM

## 2022-06-17 DIAGNOSIS — R00.0 SINUS TACHYCARDIA: ICD-10-CM

## 2022-07-11 ENCOUNTER — TELEPHONE (OUTPATIENT)
Dept: CARDIOLOGY | Facility: MEDICAL CENTER | Age: 27
End: 2022-07-11
Payer: COMMERCIAL

## 2022-07-11 PROCEDURE — 93228 REMOTE 30 DAY ECG REV/REPORT: CPT | Performed by: STUDENT IN AN ORGANIZED HEALTH CARE EDUCATION/TRAINING PROGRAM

## 2022-07-15 ENCOUNTER — PHARMACY VISIT (OUTPATIENT)
Dept: PHARMACY | Facility: MEDICAL CENTER | Age: 27
End: 2022-07-15
Payer: COMMERCIAL

## 2022-07-15 ENCOUNTER — OFFICE VISIT (OUTPATIENT)
Dept: RHEUMATOLOGY | Facility: MEDICAL CENTER | Age: 27
End: 2022-07-15
Payer: COMMERCIAL

## 2022-07-15 VITALS
HEIGHT: 59 IN | RESPIRATION RATE: 16 BRPM | OXYGEN SATURATION: 97 % | HEART RATE: 87 BPM | TEMPERATURE: 99.1 F | DIASTOLIC BLOOD PRESSURE: 72 MMHG | WEIGHT: 174.4 LBS | SYSTOLIC BLOOD PRESSURE: 102 MMHG | BODY MASS INDEX: 35.16 KG/M2

## 2022-07-15 DIAGNOSIS — M35.9 UNDIFFERENTIATED CONNECTIVE TISSUE DISEASE (HCC): ICD-10-CM

## 2022-07-15 DIAGNOSIS — R76.8 POSITIVE ANA (ANTINUCLEAR ANTIBODY): ICD-10-CM

## 2022-07-15 DIAGNOSIS — G43.809 OTHER MIGRAINE WITHOUT STATUS MIGRAINOSUS, NOT INTRACTABLE: ICD-10-CM

## 2022-07-15 PROCEDURE — 99204 OFFICE O/P NEW MOD 45 MIN: CPT | Performed by: STUDENT IN AN ORGANIZED HEALTH CARE EDUCATION/TRAINING PROGRAM

## 2022-07-15 PROCEDURE — RXMED WILLOW AMBULATORY MEDICATION CHARGE: Performed by: NURSE PRACTITIONER

## 2022-07-15 RX ORDER — SUMATRIPTAN 50 MG/1
50 TABLET, FILM COATED ORAL
Qty: 10 TABLET | Refills: 3 | Status: SHIPPED | OUTPATIENT
Start: 2022-07-15 | End: 2024-01-04

## 2022-07-15 ASSESSMENT — FIBROSIS 4 INDEX: FIB4 SCORE: 0.42

## 2022-07-15 NOTE — PATIENT INSTRUCTIONS
AFTER VISIT INSTRUCTIONS    Below are important information to help you navigate your healthcare needs and help us serve you safely and effectively:  If laboratory tests and/or imaging studies were ordered, remember to go get them done.  If new prescriptions or refills were sent to the pharmacy, remember to go pick them up.  Take your medications exactly as prescribed unless instructed otherwise.  Follow up with your primary care provider and/or specialists as scheduled.  If there are significant findings from your lab tests and imaging studies, I will notify you with explanations via AudienceViewt or phone call, otherwise you can view them on Arkeo and let me know if you have any questions.  Sign up for Arkeo if you have not already done so, in order to have access to the results of your lab tests and imaging studies, and to be able to send and receive messages from me.  Note that Arkeo messaging is for non-urgent matters that do not require immediate attention and are typically read only during office hours, so do not expect immediate responses from me.

## 2022-07-15 NOTE — PROGRESS NOTES
[facility]  [address] [place] [address], [address], [place] [address]  Phone: [phone] / Fax: [phone]    RHEUMATOLOGY NEW PATIENT VISIT NOTE      DATE OF SERVICE: 07/15/2022    REFERRING PROVIDER:  BONI Leigh  [address] [facility]  [address]  [place] [id number]      SUBJECTIVE:     CHIEF COMPLAINT:   Chief Complaint   Patient presents with   • New Patient     Evaluation for Positive NATIVIDAD        HISTORY OF PRESENT ILLNESS:  Isa Padgett is a 26 y.o. female with pertinent history notable for mild obstructive sleep apnea and anxiety/depression who presents for evaluation of multiple symptoms in the setting of positive NATIVIDAD. Reports onset of symptoms in 2020 with joint/tendon pain in her hands (mostly MCP joints), knees, and neck with occipital headaches. These are associated with less than 30 minutes of morning stiffness that improves with warm bath and activity. Reports gastric reflux with globus sensation, cold-induced color changes of fingers/toes/nose, dry eyes with blurry vision, nonrestorative sleep, and multiple nonspecific symptoms. She has been managing with Advil as needed which seems to be helpful for her musculoskeletal pain.    Pertinent labs as of 5/2022: Positive NATIVIDAD 1:640 homogenous pattern with negative reflex panel (increased from 1:320 in 3/2021); negative/normal RF and ACPA (in 3/2021), C3 and C4 (in 3/2021), anti-TPO, TSH, HBV and HCV (in 8/2021), CMP and unremarkable CBC.    REVIEW OF SYSTEMS:  Except as noted in the history above, a complete review of systems with emphasis on autoimmune inflammatory conditions was otherwise negative for any significant symptoms.      ACTIVE PROBLEM LIST:  Patient Active Problem List   Diagnosis   • Dysthymia   • Gastroesophageal reflux disease with esophagitis   • Obesity (BMI 30.0-34.9)   • Anxiety   • Insomnia   • GUERA (obstructive sleep apnea)   • Severe episode of recurrent major depressive  disorder, without psychotic features (HCC)   • Cold intolerance   • Positive NATIVIDAD (1:640 homogenous pattern with negative reflex)   • Undifferentiated connective tissue disease (HCC)   No problem-specific Assessment & Plan notes found for this encounter.      PAST MEDICAL HISTORY:  Past Medical History:   Diagnosis Date   • Anemia 2013   • Anxiety 1/23/2018   • Depression    • Dysthymia 11/8/2016   • Infectious mononucleosis    • Nausea and vomiting in pregnancy 2/20/2013       PAST SURGICAL HISTORY:  Past Surgical History:   Procedure Laterality Date   • ORIF, FINGER  6/24/2014    Performed by Ed Espitia M.D. at SURGERY [facility]   • PERCUTANEOUS PINNING  6/24/2014    Performed by Ed Espitia M.D. at [facility]       MEDICATIONS:  Current Outpatient Medications   Medication Sig Dispense Refill   • SUMAtriptan (IMITREX) 50 MG Tab Take 1 Tablet by mouth one time as needed for Migraine for up to 1 dose. 10 Tablet 3   • buPROPion (WELLBUTRIN XL) 150 MG XL tablet Take 1 Tablet by mouth every morning. 90 Tablet 0   • buPROPion (WELLBUTRIN XL) 300 MG XL tablet Take 1 Tablet by mouth every morning. Combine with 150 mg for a total daily dose of 450 mg. 90 Tablet 0   • sertraline (ZOLOFT) 100 MG Tab Take 2 Tablets by mouth every day. 180 Tablet 0   • omeprazole (PRILOSEC) 40 MG delayed-release capsule Take 1 Capsule by mouth every day. 90 Capsule 3   • hydrOXYzine pamoate (VISTARIL) 25 MG Cap Take 1 to 3 tablets by mouth once a day as need for insomnia or anxiety 90 Capsule 3   • tacrolimus (PROTOPIC) 0.1 % Ointment Apply sparingly to affected area on face 2 times a day. 30 g 1   • clindamycin (CLEOCIN) 1 % Solution Apply to affected area on face/shoulders once or twice daliy after benzoyl peroxide wash for acne. (Patient taking differently: Apply to affected area on face/shoulders once or twice daliy after benzoyl peroxide wash for acne.) 60 mL 3   • tretinoin (RETIN-A) 0.025 % cream  Apply to affected area on face/shoulders at bedtime for acne. (Patient taking differently: Apply to affected area on face/shoulders at bedtime for acne.) 45 g 3   • triamcinolone acetonide (KENALOG) 0.1 % Ointment Apply 1 Application topically 2 times a day. body 45 g 1   • albuterol 108 (90 Base) MCG/ACT Aero Soln inhalation aerosol Inhale 2 Puffs by mouth every 6 hours as needed for Shortness of Breath. 8.5 g 11     No current facility-administered medications for this visit.       ALLERGIES:   No Known Allergies    IMMUNIZATIONS:  Immunization History   Administered Date(s) Administered   • DTP - Historical vaccine 01/13/1994, 03/09/1994, 05/10/1994   • Dtap Vaccine 01/13/1994, 03/09/1994, 05/10/1994, 02/01/1995, 03/10/1998   • HIB Vaccine (ACTHIB/HIBERIX) 01/13/1994, 03/09/1994, 05/10/1994, 02/01/1995   • HPV Quadrivalent Vaccine (GARDASIL) - HISTORICAL DATA 05/21/2009, 07/23/2009, 11/19/2010, 03/24/2011, 03/24/2011   • Hepatitis A Vaccine, Ped/Adol 06/15/2005, 01/05/2006   • Hepatitis B Vaccine Adolescent/Pediatric 01/13/1994, 03/09/1994, 05/10/1994   • Hib Vaccine (Prp-d) - HISTORICAL DATA 01/13/1994, 03/09/1994, 05/10/1994, 02/01/1995   • Influenza LAIV (Nasal) - HISTORICAL DATA 10/30/2009, 11/19/2010   • Influenza Vaccine Quad Inj (Pf) 10/16/2015, 09/19/2017, 09/19/2018, 09/30/2019, 09/22/2020, 09/27/2021   • Influenza Vaccine Quad Inj (Preserved) 10/17/2016   • MMR Vaccine 02/01/1995, 03/10/1998   • MODERNA SARS-COV-2 VACCINE (12+) 12/30/2020, 01/30/2021   • Meningococcal Conjugate Vaccine MCV4 (MENVEO) 05/21/2009   • Meningococcal Conjugate Vaccine MCV4 (Menactra) 05/21/2009   • OPV TRIVALENT - HISTORICAL DATA (GIVEN PRIOR TO MAY 2016) 01/13/1994, 03/09/1994, 05/10/1994, 03/10/1998   • TD Vaccine 01/05/2006   • Tdap Vaccine 01/05/2006, 09/26/2013   • Tuberculin Skin Test 12/17/2014, 12/24/2014   • Varicella Vaccine Live 03/10/1998, 08/01/2006       SOCIAL HISTORY:   Social History     Tobacco Use   •  "Smoking status: Former Smoker     Packs/day: 0.05     Years: 2.00     Pack years: 0.10     Types: Cigarettes     Quit date: 2011     Years since quittin.4   • Smokeless tobacco: Never Used   Vaping Use   • Vaping Use: Never used   Substance Use Topics   • Alcohol use: No   • Drug use: No       FAMILY HISTORY:  Family History   Problem Relation Age of Onset   • Cancer Maternal Aunt         breast cancer    • Hyperlipidemia Maternal Grandfather    • Diabetes Maternal Grandfather         diet control   • Hypertension Maternal Grandfather    • Heart Disease Maternal Grandfather    • Lupus Mother    • Rheumatologic Disease Mother    • Hypertension Father    • Diabetes Maternal Uncle    • Lung Disease Paternal Aunt         asthma        OBJECTIVE:     Vital Signs: /72 (BP Location: Left arm, Patient Position: Sitting, BP Cuff Size: Adult)   Pulse 87   Temp 37.3 °C (99.1 °F) (Temporal)   Resp 16   Ht 1.504 m (4' 11.2\")   Wt 79.1 kg (174 lb 6.4 oz)   SpO2 97% Body mass index is 34.99 kg/m².    General: Appears well and comfortable  Eyes: No scleral or conjunctival lesions  ENT: No apparent oral or nasal lesions  Head/Neck: No apparent scalp or neck lesions  Cardiovascular: Regular rate and rhythm; no pericardial rubs  Respiratory: Breathing quiet and unlabored; no rales or pleural rubs  Gastrointestinal: No organomegaly or abdominal masses  Integumentary: No significant cutaneous lesions or discolorations  Musculoskeletal: Poorly localized mild tenderness of hands; no periarticular soft tissue swelling, warmth, erythema, or overt signs of synovitis; no significant restriction in range of motion of joints examined  Neurologic: No focal sensory or motor deficits  Psychiatric: Mood and affect appropriate      LABORATORY RESULTS REVIEWED AND INTERPRETED BY ME:  Lab Results   Component Value Date/Time    RHEUMFACTN <10 2021 12:36 PM    RHEUMFACTN <10 2021 12:36 PM    CCPANTIBODY 5 2021 " 12:36 PM     Lab Results   Component Value Date/Time    ANTINUCAB Detected (A) 05/13/2022 01:32 PM    E8YTIPGCMHQ 92 03/26/2021 12:36 PM    X1BCEAXKNIY 13 03/26/2021 12:36 PM     Lab Results   Component Value Date/Time    ANTIDNADS 8 05/13/2022 01:32 PM    SMITHAB 1 05/13/2022 01:32 PM    RNPAB 1 05/13/2022 01:32 PM    AHYYBWY29 2 05/13/2022 01:32 PM    SSA60 0 05/13/2022 01:32 PM    SSA52 4 05/13/2022 01:32 PM    ANTISSBSJ 0 05/13/2022 01:32 PM    JO1AB 0 05/13/2022 01:32 PM     Lab Results   Component Value Date/Time    MICROSOMALA 9.0 05/13/2022 01:32 PM    TSHULTRASEN 1.600 05/13/2022 01:32 PM    FREET4 0.81 03/23/2019 11:22 AM     Lab Results   Component Value Date/Time    HEPBSAG Negative 02/17/2018 08:54 AM    HEPCAB Non-Reactive 08/27/2021 01:09 PM     Lab Results   Component Value Date/Time    QNTTBGOLD Negative 03/15/2016 09:50 AM     Lab Results   Component Value Date/Time    ASTSGOT 15 07/31/2020 01:19 PM    ALTSGPT 17 07/31/2020 01:19 PM    ALKPHOSPHAT 60 07/31/2020 01:19 PM    TBILIRUBIN 0.6 07/31/2020 01:19 PM    TOTPROTEIN 8.0 07/31/2020 01:19 PM    ALBUMIN 4.8 07/31/2020 01:19 PM     Lab Results   Component Value Date/Time    SODIUM 139 07/31/2020 01:19 PM    POTASSIUM 3.6 07/31/2020 01:19 PM    CHLORIDE 103 07/31/2020 01:19 PM    CO2 21 07/31/2020 01:19 PM    GLUCOSE 82 07/31/2020 01:21 PM    BUN 8 07/31/2020 01:19 PM    CREATININE 0.88 07/31/2020 01:19 PM    CALCIUM 9.5 07/31/2020 01:19 PM     Lab Results   Component Value Date/Time    WBC 7.1 05/13/2022 01:32 PM    RBC 4.71 05/13/2022 01:32 PM    HEMOGLOBIN 14.4 05/13/2022 01:32 PM    HEMATOCRIT 43.1 05/13/2022 01:32 PM    MCV 91.5 05/13/2022 01:32 PM    MCH 30.6 05/13/2022 01:32 PM    MCHC 33.4 (L) 05/13/2022 01:32 PM    RDW 43.6 05/13/2022 01:32 PM    PLATELETCT 242 05/13/2022 01:32 PM    MPV 9.8 05/13/2022 01:32 PM    NEUTS 3.11 03/23/2019 11:22 AM    LYMPHOCYTES 35.30 03/23/2019 11:22 AM    MONOCYTES 7.70 03/23/2019 11:22 AM    EOSINOPHILS  2.10 03/23/2019 11:22 AM    BASOPHILS 0.30 03/23/2019 11:22 AM     Lab Results   Component Value Date/Time    FERRITIN 21.7 02/17/2018 08:56 AM    IRON 75 02/17/2018 08:56 AM    FOLATE 16.0 08/01/2017 02:02 PM     Lab Results   Component Value Date/Time    COLORURINE Yellow 05/13/2022 01:33 PM    SPECGRAVITY 1.006 05/13/2022 01:33 PM    PHURINE 6.5 05/13/2022 01:33 PM    GLUCOSEUR Negative 05/13/2022 01:33 PM    KETONES Negative 05/13/2022 01:33 PM    PROTEINURIN Negative 05/13/2022 01:33 PM     Lab Results   Component Value Date/Time    MICROALBUR <1.2 05/13/2022 01:32 PM    MALBCRT see below 05/13/2022 01:32 PM     Lab Results   Component Value Date/Time    CHOLSTRLTOT 161 08/27/2021 01:09 PM    LDL 93 08/27/2021 01:09 PM    HDL 58 08/27/2021 01:09 PM    TRIGLYCERIDE 50 08/27/2021 01:09 PM       RADIOLOGY RESULTS REVIEWED AND INTERPRETED BY ME:  Results for orders placed during the hospital encounter of 02/10/21    DX-KNEE COMPLETE 4+ LEFT    Impression  Negative LEFT knee series.    Results for orders placed during the hospital encounter of 03/28/22    DX-FINGER(S) 2+ RIGHT    Impression  1.  There is no acute displaced fracture of the right 1st digit.    Results for orders placed during the hospital encounter of 07/14/14    DX-HAND 3+    Impression  1. Examination significantly limited by overlying bandaging. Patient is status post pinning of a distal fifth metacarpal fracture.  The fracture is again seen but not well delineated on the current examination.    2. Osteopenia.    Results for orders placed during the hospital encounter of 04/15/22    US-RENAL    Impression  1.  Normal renal ultrasound.      All relevant laboratory and imaging results reported on this note were reviewed and interpreted by me.      ASSESSMENT AND PLAN:     Isa Padgett is a 26 y.o. female with history as noted above whose presentation merits the following diagnostic and clinical status impressions and  recommendations:    1. Undifferentiated connective tissue disease (HCC)  Clinical picture, in the context of her autoantibody profile (with doubling in titer within a year) and family history of lupus/rheumatoid arthritis in her mother, is compatible with undifferentiated connective tissue disease. In this case, the clinical and serologic manifestations include overlapping variable features of systemic lupus erythematosus and mixed connective tissue disease. Patients with undifferentiated connective tissue disease either remain in this state over the long-term or eventually evolve into a specific disease entity especially if left untreated. To prevent further disease evolution and progression, would consider initiation of immunomodulatory therapy with hydroxychloroquine after completing the additional work-up noted below.  - Sed Rate  - CRP QUANTITIVE (NON-CARDIAC)  - HLA-B27    2. Positive NATIVIDAD (1:640 homogenous pattern with negative reflex)  This immunofluorescence pattern of NATIVIDAD is typically seen with anti-dsDNA (associated with systemic lupus), anti-histone and anti-chromatin (both associated with drug-induced lupus). Given the anti-dsDNA is negative, need to evaluate for possible evolving drug-induced lupus.  - ANTI-HISTONE ABS  - CHROMATIN AB,IGG      FOLLOW-UP: Return in about 3 months (around 10/15/2022) for Short.           Thank you for giving me the opportunity to participate in the care of Isa Padgett.    Mika Morrison MD, MS  Rheumatologist, [facility]  , Department of Internal Medicine  [facility]

## 2022-08-29 PROCEDURE — RXMED WILLOW AMBULATORY MEDICATION CHARGE: Performed by: NURSE PRACTITIONER

## 2022-08-29 RX ORDER — CLINDAMYCIN PHOSPHATE 11.9 MG/ML
SOLUTION TOPICAL
Qty: 60 ML | Refills: 3 | Status: CANCELLED | OUTPATIENT
Start: 2022-08-29

## 2022-09-01 ENCOUNTER — PHARMACY VISIT (OUTPATIENT)
Dept: PHARMACY | Facility: MEDICAL CENTER | Age: 27
End: 2022-09-01
Payer: COMMERCIAL

## 2022-09-08 PROCEDURE — RXMED WILLOW AMBULATORY MEDICATION CHARGE: Performed by: DERMATOLOGY

## 2022-09-08 RX ORDER — CLINDAMYCIN PHOSPHATE 11.9 MG/ML
SOLUTION TOPICAL
Qty: 60 ML | Refills: 3 | Status: SHIPPED | OUTPATIENT
Start: 2022-09-08 | End: 2023-01-13 | Stop reason: SDUPTHER

## 2022-09-16 ENCOUNTER — TELEMEDICINE (OUTPATIENT)
Dept: BEHAVIORAL HEALTH | Facility: CLINIC | Age: 27
End: 2022-09-16
Payer: COMMERCIAL

## 2022-09-16 DIAGNOSIS — F41.1 GAD (GENERALIZED ANXIETY DISORDER): ICD-10-CM

## 2022-09-16 DIAGNOSIS — F33.2 SEVERE EPISODE OF RECURRENT MAJOR DEPRESSIVE DISORDER, WITHOUT PSYCHOTIC FEATURES (HCC): ICD-10-CM

## 2022-09-16 DIAGNOSIS — F41.9 ANXIETY: ICD-10-CM

## 2022-09-16 DIAGNOSIS — F33.2 SEVERE EPISODE OF RECURRENT MAJOR DEPRESSIVE DISORDER, WITHOUT PSYCHOTIC FEATURES (HCC): Chronic | ICD-10-CM

## 2022-09-16 PROCEDURE — 99214 OFFICE O/P EST MOD 30 MIN: CPT | Mod: 95 | Performed by: PSYCHIATRY & NEUROLOGY

## 2022-09-16 PROCEDURE — 90833 PSYTX W PT W E/M 30 MIN: CPT | Mod: 95 | Performed by: PSYCHIATRY & NEUROLOGY

## 2022-09-16 PROCEDURE — RXMED WILLOW AMBULATORY MEDICATION CHARGE: Performed by: PSYCHIATRY & NEUROLOGY

## 2022-09-16 RX ORDER — SERTRALINE HYDROCHLORIDE 100 MG/1
200 TABLET, FILM COATED ORAL DAILY
Qty: 180 TABLET | Refills: 0 | Status: SHIPPED | OUTPATIENT
Start: 2022-09-16 | End: 2022-10-14 | Stop reason: SDUPTHER

## 2022-09-16 RX ORDER — BUPROPION HYDROCHLORIDE 300 MG/1
300 TABLET ORAL EVERY MORNING
Qty: 90 TABLET | Refills: 0 | Status: SHIPPED | OUTPATIENT
Start: 2022-09-16 | End: 2022-10-14 | Stop reason: SDUPTHER

## 2022-09-16 RX ORDER — BUPROPION HYDROCHLORIDE 150 MG/1
150 TABLET ORAL EVERY MORNING
Qty: 90 TABLET | Refills: 0 | Status: SHIPPED | OUTPATIENT
Start: 2022-09-16 | End: 2022-10-14 | Stop reason: SDUPTHER

## 2022-09-16 NOTE — PROGRESS NOTES
"[facility]  PSYCHIATRIC FOLLOW-UP NOTE    This evaluation was conducted via Zoom, using secure and encrypted videoconferencing technology. The patient was physical located at their home address in [place], [place], and the physician was located at  her home office in [place], [place]. The patient was presented by self, at home. The patient’s identity was confirmed and verbal consent for the telemedicine encounter was obtained.    CC:  Presents for follow up visit for medication evaluation and management      History Of Present Illness:  Isa Padgett is a 26 y.o. female with a history of MDD, PATIENCE, and insomnia, with comorbid medical problems including GERD, mild obstructive sleep apnea and obesity, presents today for follow up.  She is a former patient of Dr. Cerna who retired.      The patient reported the following:  She has discontinued the Zoloft, felt it was making her feel too sedated.  Feels depressed, low self esteem, needing reassurance, and wonders if she has Bipolar DO b/c moods are either high or low and no in between, endorses symptoms of hypomania, increased self esteem, rapid speech, more energy, needing less sleep, engaging in more goal directed activity and risk taking behavior, lasting more than 4 days.  She has the psychosocial stressor of being in a romantic relationship and her responsibility as a parent to her 10 yo, ex. Her daughter is a picky eater but her live-in BF drinks her drinks and dismissing the patient's concerns.  She feels bad about herself and is critical of herself.  Is worrying a lot.  She feels like she constantly needs to be productive, can't relax or allow herself to relax.  Gets feelings hurt easily and hard to move past things.    History from 5/ 15/20 visit:  \"The patient says that her mood has been in the middle, averaging a 5 out of 10 with 10 being a great mood, she has been more irritable over the last " "month.  She notices that she lacks motivation and is not as interested in things as she usually is and this is been going on for about a month.  For example she normally lives make up and she is not interested in wearing it or using it.  She knows she ought to go to the gym and workout or workout in general and she tells herself \"if I do not have to, I do not want to.\"  She says her anxiety has been pretty well controlled.  She does not let things bother her as much as she used to.  For example running late used to really upset her and now she is able to let it roll off of her back.  Regarding her insomnia some days she is able to sleep okay and then other days she has a hard time falling asleep and then wakes up during the night.  She drinks 1 cup of coffee at least in the morning.  Educated her about caffeine and that can be in her system for up to 70 hours.\"    Psychiatric History:  Denies any hospitalizations  Denies any history of SI, SA or self harm  Dr. Cerna beginning in May 2019.  She was referred by her primary care physician.  Her last appointment was August 16, 2019.  She saw Dr. DOCKERY approximately 3 times  Medication trials: She is currently taking Zoloft 50 mg and previously tried citalopram and buspirone.  She also has a prescription for Ambien.    Family History:  Brother with anxiety, she denies any family history of bipolar disorder, schizophrenia or suicide attempts.    Social History:  The patient is from [place], she is the oldest of 4 children, her youngest sibling is 12 years old.  Her mother was a single mom and said the patient was always responsible for taking care of her siblings.  She is very protective of her younger siblings.  She says school is okay for her that she did get bullied in elementary school for her weight but she never felt vulnerable but more defensive about this.  She said it got better in middle school.  In high school she was very involved in her schoolwork but she did " not like sports.  She works at a [employer] for a while and then she got pregnant with her daughter.  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    Depression screening:  Depression Screen (PHQ-2/PHQ-9) 8/7/2020 11/12/2021 1/21/2022   PHQ-2 Total Score 1 - -   PHQ-2 Total Score - - -   PHQ-2 Total Score - 4 0   PHQ-9 Total Score 2 - -   PHQ-9 Total Score - 13 -       Past Medical, Family and Social History reviewed with the patient.    Current medications and allergies reviewed with the patient.    REVIEW OF SYSTEMS:        Constitutional Positive - obesity   Eyes negative   Ears/Nose/Mouth/Throat negative   Cardiovascular negative   Respiratory Positive - GUERA   Gastrointestinal negative   Genitourinary negative   Muscular negative   Integumentary negative   Neurological negative   Endocrine negative   Hematologic/Lymphatic negative       Physical Examination and Psychiatric Mental Status Examination:  Vital signs: There were no vitals taken for this visit.    CONSTITUTIONAL:  General Appearance:  Clean, casual attire, good eye contact, engaged with provider    ORIENTATION:  Oriented to time, place and person  RECENT AND REMOTE MEMORY:  Grossly intact  ATTENTION SPAN AND CONCENTRATION:  within normal range  LANGUAGE:  no deficits appreciated  FUND OF KNOWLEDGE:  has awareness of current events, past history and normal vocabulary  SPEECH:  normal volume, amount, rate and articulation, no perseveration or paucity of language  MOOD:  Depressed  AFFECT:  Constricted, tearful  THOUGHT PROCESS:  logical and goal directed  THOUGHT CONTENT:  Denies any SI/HI or AVH, no delusional thinking nor preoccupations appreciated  ASSOCIATIONS:  Intact, not loose, no tangentiality or circumstantiality  MEMORY:  No  gross evidence of memory deficits  JUDGMENT:  adequate concerning everyday activities  INSIGHT:  adequate to psychiatric condition        DIAGNOSTIC IMPRESSION:  1. Severe episode of recurrent major depressive disorder, without psychotic features (HCC)    2. PATIENCE (generalized anxiety disorder)     Asssessment and Plan:  1. Major depression, recurrent, severe, worsening, r/o Bipolar DO II  2. PATIENCE, improving  R/o ADHD  R/o Binge eating disorder  Ordered Genesight testing to help with medication selection and to eval for risk of SJ rash with Lamictal  Consider Lamictal approved for Bipolar DO, may be helpful in unipolar depression also  Self d/c'd Sertraline 200 mg, was increased from 150 mg at her 11/12/21 visit  Reduce Wellbutrin XL from 450 mg to 300 mg, given difficulty relaxing and always feeling like she needs to be active/productive doing something, had increased from 300 mg at her 11/12/21 visit  Make f/u therapy appts, in individual therapy for MDD, PATIENCE, and help with being assertive, working with Rock Ferraro  Educated her about the IOP but she said she cannot participate due to her work schedule  Previously reviewed daily intentional breathing practice to reduce anxiety   Screen for binge eating disorder at future appt and consider stimulant if positive  Reviewed prior visit HPI, histories and treatment plan in preparation for today's visit     3.  GUERA, MILD   She works for [employer]/sleep medicine and said they are too backlogged at this time, back orders and recalls  4. Obesity, r/o binge eating disorder    5. Insomnia  Continue reduced caffeine intake  Continue melatonin 5 mg 1/2 to 1 up to 3 hours before bedtime, has gummy and it works   Not taking: Trazodone 50 mg, groggy next day but got a good night's rest  Not taking Hydroxyzine Pamoate 25 mg 1 to 3 PRN insomnia or anxiety    Follow up in 2 to 4 weeks or call sooner PRN    Risks, benefits, alternatives and side effects were discussed for all  medicines prescribed at this visit.  The patient voiced understanding.  The patient agrees to call the clinic with any questions or concerns, or seek emergent medical care if warranted.    The patient has a safety plan that includes calling the 1-800 crisis line number, calling 911 and/or going to the nearest Emergency Department if symptoms worsen.    The proposed treatment plan was discussed with the patient who was provided the opportunity to ask questions and make suggestions regarding alternative treatment. The patient verbalized understanding and expressed agreement with the plan.    Greater than 16 minutes of the visit was spent in psychotherapy.     Psychotherapy include:  Supportive psychotherapy and psychoeducation, topics: psychosocial stressors, being critical of herself, coping strategies, bipolar DO v. Unipolar depression, genetic testing for mental health medications and how it can be helpful.          Frances Forbes M.D.    This note was created using voice recognition software (Dragon). The accuracy of the dictation is limited by the abilities of the software. I have reviewed the note prior to signing, however some errors in grammar and context are still possible. If you have any questions related to this note please do not hesitate to contact our office.

## 2022-09-19 ENCOUNTER — TELEMEDICINE (OUTPATIENT)
Dept: BEHAVIORAL HEALTH | Facility: CLINIC | Age: 27
End: 2022-09-19
Payer: COMMERCIAL

## 2022-09-19 ENCOUNTER — PHARMACY VISIT (OUTPATIENT)
Dept: PHARMACY | Facility: MEDICAL CENTER | Age: 27
End: 2022-09-19
Payer: COMMERCIAL

## 2022-09-19 DIAGNOSIS — F41.1 GAD (GENERALIZED ANXIETY DISORDER): ICD-10-CM

## 2022-09-19 DIAGNOSIS — F33.2 SEVERE EPISODE OF RECURRENT MAJOR DEPRESSIVE DISORDER, WITHOUT PSYCHOTIC FEATURES (HCC): Chronic | ICD-10-CM

## 2022-09-19 PROCEDURE — 90837 PSYTX W PT 60 MINUTES: CPT | Mod: 95 | Performed by: MARRIAGE & FAMILY THERAPIST

## 2022-09-19 NOTE — PROGRESS NOTES
[facility]   Therapy Progress Note      This visit was conducted via Zoom using secure and encrypted videoconferencing technology.  The patient was in a private location in the state of [place].  The patient's identity was confirmed and verbal consent was obtained for this virtual visit.  Place of Service: POS 10 -The patient is at Home during their visit           Name: Isa Padgett  MRN: [id number]  : 1995  Age: 26 y.o.  Date of assessment: 2022  PCP: BONI Leigh  Persons in attendance: Patient  Total session time: 56 minutes    Objective Observations:   Participation:Active verbal participation, Attentive, and Engaged   Grooming:Casual   Cognition:Alert and Fully Oriented   Eye Contact:Good   Mood:Euthymic and Anxious   Affect:Flexible and Full range   Thought Process:Logical and Goal-directed   Speech:Rate within normal limits and Volume within normal limits    Current Risk:   Suicide: low   Homicide: low   Self-Harm: low   Relapse: low   Safety Plan Reviewed: not applicable    Topics addressed in psychotherapy include: Patient reports that she broke up with her boyfriend last week  due to be mentally drained from the relationship. Patient was last seen in 2022. Discussed communications with the patient. Discussed previous arguments. Discussed her relationship and the realization that it  is over. Discussed not wanting to be her mother, stuck in a relationship. Discussed when will you accept only the very best yourself. Discussed validation.     Care Plan Updated: No    Does patient express agreement with the above plan? Yes     Diagnosis:  1. Severe episode of recurrent major depressive disorder, without psychotic features (HCC)    2. PATIENCE (generalized anxiety disorder)        Referral appointment(s) scheduled? No       MARIUM Rico

## 2022-10-14 ENCOUNTER — TELEMEDICINE (OUTPATIENT)
Dept: BEHAVIORAL HEALTH | Facility: CLINIC | Age: 27
End: 2022-10-14
Payer: COMMERCIAL

## 2022-10-14 DIAGNOSIS — F41.1 GAD (GENERALIZED ANXIETY DISORDER): ICD-10-CM

## 2022-10-14 DIAGNOSIS — F33.2 SEVERE EPISODE OF RECURRENT MAJOR DEPRESSIVE DISORDER, WITHOUT PSYCHOTIC FEATURES (HCC): ICD-10-CM

## 2022-10-14 DIAGNOSIS — F41.9 ANXIETY: ICD-10-CM

## 2022-10-14 PROCEDURE — 99214 OFFICE O/P EST MOD 30 MIN: CPT | Mod: 95 | Performed by: PSYCHIATRY & NEUROLOGY

## 2022-10-14 RX ORDER — BUPROPION HYDROCHLORIDE 300 MG/1
300 TABLET ORAL EVERY MORNING
Qty: 90 TABLET | Refills: 0 | Status: SHIPPED | OUTPATIENT
Start: 2022-10-14 | End: 2023-02-09 | Stop reason: SDUPTHER

## 2022-10-14 RX ORDER — BUPROPION HYDROCHLORIDE 150 MG/1
150 TABLET ORAL EVERY MORNING
Qty: 90 TABLET | Refills: 0 | Status: SHIPPED | OUTPATIENT
Start: 2022-10-14 | End: 2023-02-09 | Stop reason: SDUPTHER

## 2022-10-14 RX ORDER — SERTRALINE HYDROCHLORIDE 100 MG/1
200 TABLET, FILM COATED ORAL DAILY
Qty: 180 TABLET | Refills: 0 | Status: SHIPPED | OUTPATIENT
Start: 2022-10-14 | End: 2023-02-09 | Stop reason: SDUPTHER

## 2022-10-14 NOTE — PROGRESS NOTES
"[facility]  PSYCHIATRIC FOLLOW-UP NOTE    This evaluation was conducted via Zoom, using secure and encrypted videoconferencing technology. The patient was physical located at their home address in [place], [place], and the physician was located at  her home office in [place], [place]. The patient was presented by self, at home. The patient’s identity was confirmed and verbal consent for the telemedicine encounter was obtained.    CC:  Presents for follow up visit for medication evaluation and management      History Of Present Illness:  Isa Padgett is a 26 y.o. female with a history of MDD, PATIENCE, and insomnia, with comorbid medical problems including GERD, mild obstructive sleep apnea and obesity, presents today for follow up.  She is a former patient of Dr. Cerna who retired.      The patient reported the following:  She is feeling much better, restarted the Zoloft, at 200 mg and kept the Wellbutrin XL at 450 mg, she is listening to audio books, has taken time off work, focusing  on herself, working to be more positive about her self talk and to not worry about things she cannot control.  She and her ex are working on themselves separately.  She does not believe she has bipolar DO after reflecting on it, she believes her symptoms stem from her anxiety where her worrying can get out of control and creates the symptoms.  She denies any medication SE.  Reviewed [employer] testing results.  She has lost 10 lbs by not allowing herself to eat after 8 pm and cut out junk food.  Feels better.  Reached the highest weight she had ever been.     History from 5/ 15/20 visit:  \"The patient says that her mood has been in the middle, averaging a 5 out of 10 with 10 being a great mood, she has been more irritable over the last month.  She notices that she lacks motivation and is not as interested in things as she usually is and this is been going on for about a month.  " "For example she normally lives make up and she is not interested in wearing it or using it.  She knows she ought to go to the gym and workout or workout in general and she tells herself \"if I do not have to, I do not want to.\"  She says her anxiety has been pretty well controlled.  She does not let things bother her as much as she used to.  For example running late used to really upset her and now she is able to let it roll off of her back.  Regarding her insomnia some days she is able to sleep okay and then other days she has a hard time falling asleep and then wakes up during the night.  She drinks 1 cup of coffee at least in the morning.  Educated her about caffeine and that can be in her system for up to 70 hours.\"    Psychiatric History:  Denies any hospitalizations  Denies any history of SI, SA or self harm  Dr. Cerna beginning in May 2019.  She was referred by her primary care physician.  Her last appointment was August 16, 2019.  She saw Dr. DOCKERY approximately 3 times  Medication trials: She is currently taking Zoloft 50 mg and previously tried citalopram and buspirone.  She also has a prescription for Ambien.    Family History:  Brother with anxiety, she denies any family history of bipolar disorder, schizophrenia or suicide attempts.    Social History:  The patient is from [place], she is the oldest of 4 children, her youngest sibling is 12 years old.  Her mother was a single mom and said the patient was always responsible for taking care of her siblings.  She is very protective of her younger siblings.  She says school is okay for her that she did get bullied in elementary school for her weight but she never felt vulnerable but more defensive about this.  She said it got better in middle school.  In high school she was very involved in her schoolwork but she did not like sports.  She works at a [employer] for a while and then she got pregnant with her daughter.  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    Depression screening:  Depression Screen (PHQ-2/PHQ-9) 8/7/2020 11/12/2021 1/21/2022   PHQ-2 Total Score 1 - -   PHQ-2 Total Score - - -   PHQ-2 Total Score - 4 0   PHQ-9 Total Score 2 - -   PHQ-9 Total Score - 13 -         REVIEW OF SYSTEMS:        Constitutional Positive - obesity   Eyes negative   Ears/Nose/Mouth/Throat negative   Cardiovascular negative   Respiratory Positive - GUERA   Gastrointestinal negative   Genitourinary negative   Muscular negative   Integumentary negative   Neurological negative   Endocrine negative   Hematologic/Lymphatic negative       Physical Examination and Psychiatric Mental Status Examination:  Vital signs: There were no vitals taken for this visit.    CONSTITUTIONAL:  General Appearance:  Clean, casual attire, good eye contact, engaged with provider    ORIENTATION:  Oriented to time, place and person  RECENT AND REMOTE MEMORY:  Grossly intact  ATTENTION SPAN AND CONCENTRATION:  within normal range  LANGUAGE:  no deficits appreciated  FUND OF KNOWLEDGE:  has awareness of current events, past history and normal vocabulary  SPEECH:  normal volume, amount, rate and articulation, no perseveration or paucity of language  MOOD:  Neutral  AFFECT:  Mood congruent  THOUGHT PROCESS:  logical and goal directed  THOUGHT CONTENT:  Denies any SI/HI or AVH, no delusional thinking nor preoccupations appreciated  ASSOCIATIONS:  Intact, not loose, no tangentiality or circumstantiality  MEMORY:  No gross evidence of memory deficits  JUDGMENT:  adequate concerning everyday activities  INSIGHT:  adequate to psychiatric condition        DIAGNOSTIC IMPRESSION:  1. Severe episode of recurrent major depressive disorder, without psychotic features (HCC)  - buPROPion (WELLBUTRIN XL) 300  MG XL tablet; Take 1 Tablet by mouth every morning. Combine with 150 mg for a total daily dose of 450 mg.  Dispense: 90 Tablet; Refill: 0  - buPROPion (WELLBUTRIN XL) 150 MG XL tablet; Take 1 Tablet by mouth every morning.  Dispense: 90 Tablet; Refill: 0  - sertraline (ZOLOFT) 100 MG Tab; Take 2 Tablets by mouth every day.  Dispense: 180 Tablet; Refill: 0    2. Anxiety  - sertraline (ZOLOFT) 100 MG Tab; Take 2 Tablets by mouth every day.  Dispense: 180 Tablet; Refill: 0     Asssessment and Plan:  1. Major depression, recurrent, severe, improving.  Will be mindful she does not believe she has Bipolar DO II after reviewing the symptoms at her 9/16/22 visit, she believes those symptoms stem from anxiety  2. PATIENCE, improving  R/o ADHD  R/o Binge eating disorder  Reviewed [employer] testing, minimal genetic markers, none for Zoloft or Wellbutrin  Self restarted Sertraline at 200 mg, denies any SE  Self continued Wellbutrin XL at 450 mg, did not decrease, had increased from 300 mg at her 11/12/21 visit  Make f/u therapy appts, in individual therapy for MDD, PATIENCE, and help with being assertive, working with Rock Ferraro  Previously reviewed daily intentional breathing practice to reduce anxiety   Screen for binge eating disorder at future appt and consider stimulant if positive  Reviewed prior visit HPI, histories and treatment plan in preparation for today's visit       3.  GUERA, MILD   She works for [employer] and said they are too backlogged at this time, back orders and recalls    4. Obesity, r/o binge eating disorder    5. Insomnia  Continue reduced caffeine intake  Continue melatonin 5 mg 1/2 to 1 up to 3 hours before bedtime, has gummy and it works   Not taking: Trazodone 50 mg, groggy next day but got a good night's rest  Not taking Hydroxyzine Pamoate 25 mg 1 to 3 PRN insomnia or anxiety    Follow up in 12 weeks or call sooner PRN    Risks, benefits, alternatives and side effects were discussed  for all medicines prescribed at this visit.  The patient voiced understanding.  The patient agrees to call the clinic with any questions or concerns, or seek emergent medical care if warranted.    The patient has a safety plan that includes calling the 1-800 crisis line number, calling 911 and/or going to the nearest Emergency Department if symptoms worsen.    The proposed treatment plan was discussed with the patient who was provided the opportunity to ask questions and make suggestions regarding alternative treatment. The patient verbalized understanding and expressed agreement with the plan.          Frances Forbes M.D.    This note was created using voice recognition software (Dragon). The accuracy of the dictation is limited by the abilities of the software. I have reviewed the note prior to signing, however some errors in grammar and context are still possible. If you have any questions related to this note please do not hesitate to contact our office.

## 2022-10-17 ENCOUNTER — HOSPITAL ENCOUNTER (OUTPATIENT)
Dept: LAB | Facility: MEDICAL CENTER | Age: 27
End: 2022-10-17
Attending: STUDENT IN AN ORGANIZED HEALTH CARE EDUCATION/TRAINING PROGRAM
Payer: COMMERCIAL

## 2022-10-17 LAB
CRP SERPL HS-MCNC: <0.3 MG/DL (ref 0–0.75)
ERYTHROCYTE [SEDIMENTATION RATE] IN BLOOD BY WESTERGREN METHOD: 11 MM/HOUR (ref 0–25)

## 2022-10-17 PROCEDURE — 36415 COLL VENOUS BLD VENIPUNCTURE: CPT

## 2022-10-17 PROCEDURE — 86812 HLA TYPING A B OR C: CPT

## 2022-10-17 PROCEDURE — 83516 IMMUNOASSAY NONANTIBODY: CPT

## 2022-10-17 PROCEDURE — 86140 C-REACTIVE PROTEIN: CPT

## 2022-10-17 PROCEDURE — 85652 RBC SED RATE AUTOMATED: CPT

## 2022-10-20 LAB
CHROMATIN IGG SERPL-ACNC: 9 UNITS (ref 0–19)
HLA-B27 QL FC: NEGATIVE

## 2022-10-21 ENCOUNTER — APPOINTMENT (OUTPATIENT)
Dept: RHEUMATOLOGY | Facility: MEDICAL CENTER | Age: 27
End: 2022-10-21
Attending: STUDENT IN AN ORGANIZED HEALTH CARE EDUCATION/TRAINING PROGRAM
Payer: COMMERCIAL

## 2022-10-21 LAB — HISTONE IGG SER IA-ACNC: 0.8 UNITS (ref 0–0.9)

## 2022-10-27 ENCOUNTER — OFFICE VISIT (OUTPATIENT)
Dept: RHEUMATOLOGY | Facility: MEDICAL CENTER | Age: 27
End: 2022-10-27
Attending: STUDENT IN AN ORGANIZED HEALTH CARE EDUCATION/TRAINING PROGRAM
Payer: COMMERCIAL

## 2022-10-27 VITALS
BODY MASS INDEX: 34.47 KG/M2 | SYSTOLIC BLOOD PRESSURE: 104 MMHG | TEMPERATURE: 98.3 F | DIASTOLIC BLOOD PRESSURE: 70 MMHG | WEIGHT: 171 LBS | HEART RATE: 83 BPM | HEIGHT: 59 IN | OXYGEN SATURATION: 95 % | RESPIRATION RATE: 16 BRPM

## 2022-10-27 DIAGNOSIS — I73.00 RAYNAUD'S SYNDROME WITHOUT GANGRENE: ICD-10-CM

## 2022-10-27 DIAGNOSIS — M35.9 UNDIFFERENTIATED CONNECTIVE TISSUE DISEASE (HCC): ICD-10-CM

## 2022-10-27 PROCEDURE — 99212 OFFICE O/P EST SF 10 MIN: CPT | Performed by: STUDENT IN AN ORGANIZED HEALTH CARE EDUCATION/TRAINING PROGRAM

## 2022-10-27 PROCEDURE — 99214 OFFICE O/P EST MOD 30 MIN: CPT | Performed by: STUDENT IN AN ORGANIZED HEALTH CARE EDUCATION/TRAINING PROGRAM

## 2022-10-27 ASSESSMENT — RHEUMATOLOGY FOLLOW-UP QUESTIONNAIRE
GENITAL ULCERS: N
MUSCLE PAIN: Y
COLD-INDUCED COLOR CHANGES (WHITE, PURPLE, RED ON REWARMING): EARS
HAIR LOSS WITH BALD SPOTS: N
RINGING IN EARS: Y
BLOODY DIARRHEA: N
DEPRESSION: Y
BLEEDING GUMS: N
SINONASAL CONGESTION: N
ORAL ULCERS: N
SNORING: N
DIFFICULTY SPEAKING: N
COLD-INDUCED COLOR CHANGES (WHITE, PURPLE, RED ON REWARMING): FINGERS
CHEST PAIN WITH BREATHING: N
HEARTBURN: Y
SKIN PLAQUES: N
COUGH WITH BLOODY PHLEGM: N
COLD-INDUCED COLOR CHANGES (WHITE, PURPLE, RED ON REWARMING): TOES
DIFFICULTY SWALLOWING: N
BLURRINESS: N
DRY COUGH: Y
TEMPLE PAIN: Y
EAR PAIN: N
EYE REDNESS: N
VERTIGO: N
FROTHY URINE: N
MUSCLE WEAKNESS: N
ABDOMINAL PAIN: N
DIZZINESS: Y
DRY EYES: N
SORE THROAT: N
NAUSEA: N
TINGLING: N
SUNLIGHT-INDUCED SKIN RASH: ARMS
BLOODY CONSTIPATION: N
THYROID PAIN: N
EYE PAIN: N
NUMBNESS: Y
PALPITATIONS: N
MORNING STIFFNESS: <30 MINS
HEADACHES: Y
BLOOD IN URINE: N
SUNLIGHT-INDUCED SKIN RASH: CHEST
NOSEBLEEDS: N
FEVERS: N
CAVITIES: N
BODY ACHES: Y
SKIN THICKENING: N
JOINT SWELLING: Y
SPASMS: Y
SHORTNESS OF BREATH: N
BURNING URINATION: N
ANXIETY: Y
NIGHT SWEATS: N
VOMITING: N
SINUS PAIN: N
DRY MOUTH: N
CHILLS: N
NASAL ULCERS: N
VISION LOSS: N
PELVIC PAIN: N
IRREGULAR BEATS: N
HEARING LOSS: N
ABNORMAL DISCHARGE: N
NECK PAIN: Y

## 2022-10-27 NOTE — PROGRESS NOTES
[facility] RHEUMATOLOGY  [address], [address], [place] [address]  Phone: [phone] ? Fax: [phone]    RHEUMATOLOGY FOLLOW-UP VISIT NOTE      DATE OF SERVICE: 10/27/2022         Subjective     PRIMARY CARE PROVIDER:  BONI Leigh  [address]  [place] [address]    PATIENT IDENTIFICATION:  Isa Padgett  [address]  [facility] [id number]    YOB: 1995    MEDICAL RECORD NUMBER: [id number]          CHIEF COMPLAINT:   Chief Complaint   Patient presents with    Follow-Up     UCTD     RHEUMATOLOGIC HISTORY:  Isa Padgett is a 26 y.o. female with pertinent history notable for undifferentiated connective tissue disease diagnosed in 7/2022 (reportedly symptomatic since 2020), mild obstructive sleep apnea and anxiety/depression. Initially presented on 7/15/2022 for rheumatologic evaluation in the setting of positive NATIVIDAD. Reported onset of symptoms in 2020 with joint/tendon pain in her hands (mostly MCP joints), knees, and neck with occipital headaches, less than 30 minutes of morning stiffness that improves with warm bath and physical activity. Reported gastric reflux with globus sensation, Raynaud's phenomenon on fingers/toes/nose, dry eyes with blurry vision, nonrestorative sleep, and multiple nonspecific symptoms. She had been managing with Advil as needed which seems to be helpful for her musculoskeletal pain.    Pertinent treatment history as of 10/2022: Analgesics and NSAIDs as needed.  Pertinent labs as of 10/2022: Positive NATIVIDAD 1:640 homogenous pattern with negative reflex panel in 5/2022 (increased from 1:320 in 3/2021); negative/normal RF, ACCP, C3 and C4 (in 3/2021), anti-histone, anti-chromatin, HLA-B27, ESR, CRP, anti-TPO, TSH, HBV and HCV (in 8/2021), CMP and unremarkable CBC.    INTERVAL HISTORY:  [facility] Established Patient History Form    10/27/2022 11:10 AM PDT - Filed by Patient   Chief Complaint    Interval  History of Present Condition   Date of worsening symptom onset:    Preceding incident/ailment:    Describe/list your symptoms: Same as before   Aggravating factors: Cold   Alleviating factors:    Helpful medications:    Ineffective medications:    Symptom severity/impact (scale of 1-10):    Personal/emotional stressors:    [place] All The Locations Of Pain    REVIEW OF SYSTEMS    General   Fevers No   Chills No   Night sweats No   Unintentional Weight Loss None   Musculoskeletal   Joint pain None   Morning stiffness <30 mins   Joint swelling Yes   Achilles tendon pain    Muscle pain Yes   Body Aches Yes   Dermatologic   Hair loss with bald spots No   Hair shedding    Sunlight-induced skin rash Chest;  Arms   Skin thickening No   Skin plaques No   Cold-induced color changes (white, purple, red on rewarming) Fingers;  Toes;  Ears   Neurologic/Psychiatric   Muscle weakness No   Spasms Yes   Tingling No   Numbness Yes   Anxiety Yes   Depression Yes   Head/Eyes   Headaches Yes   Temple pain Yes   Dizziness Yes   Dry eyes No   Eye pain No   Eye redness No   Blurriness No   Vision loss No   Ears/Nose   Ear pain No   Ringing in ears Yes   Vertigo No   Hearing loss No   Nasal ulcers No   Nosebleeds No   Sinus pain No   Sinonasal congestion No   Snoring No   Mouth/Throat   Oral ulcers No   Bleeding gums No   Dry mouth No   Cavities No   Sore throat No   Difficulty speaking No   Difficulty swallowing No   Neck/Lymphatics   Neck pain Yes   Thyroid pain No   Goiter    Swollen Glands    Cardiac/Respiratory   Chest pain with breathing No   Dry cough Yes   Cough with bloody phlegm No   Shortness of breath No   Palpitations No   Irregular beats No   Gastrointestinal   Nausea No   Vomiting No   Heartburn Yes   Abdominal pain No   Bloody diarrhea No   Bloody constipation No   Genitourinary   Pelvic Pain No   Genital ulcers No   Abnormal discharge No   Burning urination No   Frothy urine No   Blood in urine No   Supplemental Information    Notable Life Changes/Adjustments Since Last Visit        ACTIVE PROBLEM LIST:  Patient Active Problem List   Diagnosis    Dysthymia    Gastroesophageal reflux disease with esophagitis    Obesity (BMI 30.0-34.9)    Anxiety    Insomnia    GUERA (obstructive sleep apnea)    Severe episode of recurrent major depressive disorder, without psychotic features (HCC)    Cold intolerance    Serologic autoimmunity    Undifferentiated connective tissue disease (HCC)    PATIENCE (generalized anxiety disorder)    Raynaud's syndrome without gangrene   No problem-specific Assessment & Plan notes found for this encounter.      PAST MEDICAL HISTORY:  Past Medical History:   Diagnosis Date    Anemia 2013    Anxiety 1/23/2018    Depression     Dysthymia 11/8/2016    Infectious mononucleosis     Nausea and vomiting in pregnancy 2/20/2013       PAST SURGICAL HISTORY:  Past Surgical History:   Procedure Laterality Date    ORIF, FINGER  6/24/2014    Performed by Ed Espitia M.D. at SURGERY [facility]    PERCUTANEOUS PINNING  6/24/2014    Performed by Ed Espitia M.D. at [facility]       MEDICATIONS:  Current Outpatient Medications   Medication Sig Dispense Refill    buPROPion (WELLBUTRIN XL) 300 MG XL tablet Take 1 Tablet by mouth every morning. Combine with 150 mg for a total daily dose of 450 mg. 90 Tablet 0    buPROPion (WELLBUTRIN XL) 150 MG XL tablet Take 1 Tablet by mouth every morning. 90 Tablet 0    sertraline (ZOLOFT) 100 MG Tab Take 2 Tablets by mouth every day. 180 Tablet 0    clindamycin (CLEOCIN) 1 % Solution Apply to affected area on face/shoulders once or twice daliy after benzoyl peroxide wash for acne. 60 mL 3    tretinoin (RETIN-A) 0.025 % cream Apply to affected area on face/shoulders at bedtime for acne. 45 g 3    SUMAtriptan (IMITREX) 50 MG Tab Take 1 Tablet by mouth one time as needed for Migraine for up to 1 dose. 10 Tablet 3    omeprazole (PRILOSEC) 40 MG delayed-release capsule Take 1  Capsule by mouth every day. 90 Capsule 3    hydrOXYzine pamoate (VISTARIL) 25 MG Cap Take 1 to 3 tablets by mouth once a day as need for insomnia or anxiety 90 Capsule 3    tacrolimus (PROTOPIC) 0.1 % Ointment Apply sparingly to affected area on face 2 times a day. 30 g 1    triamcinolone acetonide (KENALOG) 0.1 % Ointment Apply 1 Application topically 2 times a day. body 45 g 1    albuterol 108 (90 Base) MCG/ACT Aero Soln inhalation aerosol Inhale 2 Puffs by mouth every 6 hours as needed for Shortness of Breath. 8.5 g 11     No current facility-administered medications for this visit.       ALLERGIES:   No Known Allergies    IMMUNIZATIONS:  Immunization History   Administered Date(s) Administered    DTP - Historical vaccine 01/13/1994, 03/09/1994, 05/10/1994    Dtap Vaccine 01/13/1994, 03/09/1994, 05/10/1994, 02/01/1995, 03/10/1998    HIB Vaccine (ACTHIB/HIBERIX) 01/13/1994, 03/09/1994, 05/10/1994, 02/01/1995    HPV Quadrivalent Vaccine (GARDASIL) - HISTORICAL DATA 05/21/2009, 07/23/2009, 11/19/2010, 03/24/2011, 03/24/2011    Hepatitis A Vaccine, Ped/Adol 06/15/2005, 01/05/2006    Hepatitis B Vaccine Adolescent/Pediatric 01/13/1994, 03/09/1994, 05/10/1994    Hib Vaccine (Prp-d) - HISTORICAL DATA 01/13/1994, 03/09/1994, 05/10/1994, 02/01/1995    Influenza LAIV (Nasal) - HISTORICAL DATA 10/30/2009, 11/19/2010    Influenza Vaccine Quad Inj (Pf) 10/16/2015, 09/19/2017, 09/19/2018, 09/30/2019, 09/22/2020, 09/27/2021    Influenza Vaccine Quad Inj (Preserved) 10/17/2016    MMR Vaccine 02/01/1995, 03/10/1998    MODERNA SARS-COV-2 VACCINE (12+) 12/30/2020, 01/30/2021    Meningococcal Conjugate Vaccine MCV4 (MENVEO) 05/21/2009    Meningococcal Conjugate Vaccine MCV4 (Menactra) 05/21/2009    OPV TRIVALENT - HISTORICAL DATA (GIVEN PRIOR TO MAY 2016) 01/13/1994, 03/09/1994, 05/10/1994, 03/10/1998    TD Vaccine 01/05/2006    Tdap Vaccine 01/05/2006, 09/26/2013    Tuberculin Skin Test 12/17/2014, 12/24/2014    Varicella Vaccine  "Live 03/10/1998, 2006       SOCIAL HISTORY:   Social History     Tobacco Use    Smoking status: Former     Packs/day: 0.05     Years: 2.00     Pack years: 0.10     Types: Cigarettes     Quit date: 2011     Years since quittin.6    Smokeless tobacco: Never   Vaping Use    Vaping Use: Never used   Substance Use Topics    Alcohol use: No    Drug use: No       FAMILY HISTORY:  Family History   Problem Relation Age of Onset    Cancer Maternal Aunt         breast cancer     Hyperlipidemia Maternal Grandfather     Diabetes Maternal Grandfather         diet control    Hypertension Maternal Grandfather     Heart Disease Maternal Grandfather     Lupus Mother     Rheumatologic Disease Mother     Hypertension Father     Diabetes Maternal Uncle     Lung Disease Paternal Aunt         asthma            Objective     Vital Signs: /70 (BP Location: Right arm, Patient Position: Sitting, BP Cuff Size: Adult)   Pulse 83   Temp 36.8 °C (98.3 °F) (Temporal)   Resp 16   Ht 1.504 m (4' 11.2\")   Wt 77.6 kg (171 lb)   SpO2 95% Body mass index is 34.3 kg/m².    General: Appears well and comfortable  Eyes: No scleral or conjunctival lesions  ENT: No apparent oral or nasal lesions  Head/Neck: No apparent scalp or neck lesions  Cardiovascular: Regular rate and rhythm  Respiratory: Breathing quiet and unlabored  Gastrointestinal: No organomegaly or abdominal masses  Integumentary: No significant cutaneous lesions or discolorations  Musculoskeletal: No significant joint tenderness, periarticular soft tissue swelling, warmth, erythema, or overt signs of synovitis; no significant restriction in range of motion of joints examined  Neurologic: No focal sensory or motor deficits  Psychiatric: Mood and affect appropriate      LABORATORY RESULTS REVIEWED AND INTERPRETED BY ME:  Lab Results   Component Value Date/Time    SEDRATEWES 11 10/17/2022 05:47 PM    CREACTPROT <0.30 10/17/2022 05:47 PM     Lab Results   Component Value " Date/Time    RHEUMFACTN <10 03/26/2021 12:36 PM    RHEUMFACTN <10 03/26/2021 12:36 PM    CCPANTIBODY 5 03/26/2021 12:36 PM    OBFG05RKVM Negative 10/17/2022 05:47 PM     Lab Results   Component Value Date/Time    ANTINUCAB Detected (A) 05/13/2022 01:32 PM    B5SWDPMBUDS 92 03/26/2021 12:36 PM    G5ZNNHWJHJW 13 03/26/2021 12:36 PM     Lab Results   Component Value Date/Time    ANTIDNADS 8 05/13/2022 01:32 PM    SMITHAB 1 05/13/2022 01:32 PM    RNPAB 1 05/13/2022 01:32 PM    MEQMDTJ94 2 05/13/2022 01:32 PM    SSA60 0 05/13/2022 01:32 PM    SSA52 4 05/13/2022 01:32 PM    ANTISSBSJ 0 05/13/2022 01:32 PM    JO1AB 0 05/13/2022 01:32 PM     Lab Results   Component Value Date/Time    MICROSOMALA 9.0 05/13/2022 01:32 PM    TSHULTRASEN 1.600 05/13/2022 01:32 PM    FREET4 0.81 03/23/2019 11:22 AM     Lab Results   Component Value Date/Time    HEPBSAG Negative 02/17/2018 08:54 AM    HEPCAB Non-Reactive 08/27/2021 01:09 PM     Lab Results   Component Value Date/Time    QNTTBGOLD Negative 03/15/2016 09:50 AM     Lab Results   Component Value Date/Time    ASTSGOT 15 07/31/2020 01:19 PM    ALTSGPT 17 07/31/2020 01:19 PM    ALKPHOSPHAT 60 07/31/2020 01:19 PM    TBILIRUBIN 0.6 07/31/2020 01:19 PM    TOTPROTEIN 8.0 07/31/2020 01:19 PM    ALBUMIN 4.8 07/31/2020 01:19 PM     Lab Results   Component Value Date/Time    SODIUM 139 07/31/2020 01:19 PM    POTASSIUM 3.6 07/31/2020 01:19 PM    CHLORIDE 103 07/31/2020 01:19 PM    CO2 21 07/31/2020 01:19 PM    GLUCOSE 82 07/31/2020 01:21 PM    BUN 8 07/31/2020 01:19 PM    CREATININE 0.88 07/31/2020 01:19 PM    CALCIUM 9.5 07/31/2020 01:19 PM     Lab Results   Component Value Date/Time    WBC 7.1 05/13/2022 01:32 PM    RBC 4.71 05/13/2022 01:32 PM    HEMOGLOBIN 14.4 05/13/2022 01:32 PM    HEMATOCRIT 43.1 05/13/2022 01:32 PM    MCV 91.5 05/13/2022 01:32 PM    MCH 30.6 05/13/2022 01:32 PM    MCHC 33.4 (L) 05/13/2022 01:32 PM    RDW 43.6 05/13/2022 01:32 PM    PLATELETCT 242 05/13/2022 01:32 PM     MPV 9.8 05/13/2022 01:32 PM    NEUTS 3.11 03/23/2019 11:22 AM    LYMPHOCYTES 35.30 03/23/2019 11:22 AM    MONOCYTES 7.70 03/23/2019 11:22 AM    EOSINOPHILS 2.10 03/23/2019 11:22 AM    BASOPHILS 0.30 03/23/2019 11:22 AM     Lab Results   Component Value Date/Time    FERRITIN 21.7 02/17/2018 08:56 AM    IRON 75 02/17/2018 08:56 AM    FOLATE 16.0 08/01/2017 02:02 PM     Lab Results   Component Value Date/Time    COLORURINE Yellow 05/13/2022 01:33 PM    SPECGRAVITY 1.006 05/13/2022 01:33 PM    PHURINE 6.5 05/13/2022 01:33 PM    GLUCOSEUR Negative 05/13/2022 01:33 PM    KETONES Negative 05/13/2022 01:33 PM    PROTEINURIN Negative 05/13/2022 01:33 PM     Lab Results   Component Value Date/Time    MICROALBUR <1.2 05/13/2022 01:32 PM    MALBCRT see below 05/13/2022 01:32 PM     Lab Results   Component Value Date/Time    CHOLSTRLTOT 161 08/27/2021 01:09 PM    LDL 93 08/27/2021 01:09 PM    HDL 58 08/27/2021 01:09 PM    TRIGLYCERIDE 50 08/27/2021 01:09 PM       RADIOLOGY RESULTS REVIEWED AND INTERPRETED BY ME:  Results for orders placed during the hospital encounter of 02/10/21    DX-KNEE COMPLETE 4+ LEFT    Impression  Negative LEFT knee series.    Results for orders placed during the hospital encounter of 03/28/22    DX-FINGER(S) 2+ RIGHT    Impression  1.  There is no acute displaced fracture of the right 1st digit.    Results for orders placed during the hospital encounter of 07/14/14    DX-HAND 3+    Impression  1. Examination significantly limited by overlying bandaging. Patient is status post pinning of a distal fifth metacarpal fracture.  The fracture is again seen but not well delineated on the current examination.    2. Osteopenia.    Results for orders placed during the hospital encounter of 04/15/22    US-RENAL    Impression  1.  Normal renal ultrasound.      All relevant laboratory and imaging results reported on this note were reviewed and interpreted by me.         Assessment & Plan     Isa Abebe  Dayron is a 26 y.o. female with history as noted above whose presentation merits the following diagnostic and clinical status impressions and recommendations:    1. Undifferentiated connective tissue disease (HCC)  Clinically stable low disease activity with no significant evidence of evolving or impending flare despite not taking any immunomodulatory medication. However, given the potential for discordance between immunologic activity and clinical disease manifestations, may need to repeat testing of her immunologic profile in at least one year from the last time her NATIVIDAD was done.  - Consider immunomodulatory therapy with hydroxychloroquine depending on her clinical trajectory  - Plan for repeat NATIVIDAD reflexive profile around 7/2023    2. Raynaud's syndrome without gangrene  This would be considered secondary Raynaud's syndrome in the setting of her underlying undifferentiated connective tissue disease. Given her preference to avoid systemic medication like nifedipine, would consider trial of topical nitroglycerin in the future if that becomes necessary.  - Avoid or limit exposure to cold temperature extremes and wear gloves and socks to keep hands and feet warm  - Consider topical nitroglycerin if becomes necessary      FOLLOW-UP: Return in about 9 months (around 7/27/2023) for Short.         Thank you for the opportunity to participate in the care of Isa Padgett.    Mika Morrison MD, MS  Rheumatologist, [facility] Rheumatology ? [facility]   of Clinical Medicine, Department of Internal Medicine  [facility] ? [facility]

## 2022-10-27 NOTE — PATIENT INSTRUCTIONS
AFTER VISIT INSTRUCTIONS    Below are important information to help you navigate your healthcare needs and help us serve you safely and effectively:  If laboratory tests and/or imaging studies were ordered, remember to go get them done.  If new prescriptions or refills were sent to the pharmacy, remember to go pick them up.  Take your medications exactly as prescribed unless instructed otherwise.  If there are significant findings on your lab tests and imaging studies that warrant further action, I will notify you with explanations via GeckoGohart or phone call, otherwise you can view them on expressor softwaret and let me know if you have any questions.  Sign up for Care Thread if you have not already done so, in order to have access to the results of your lab tests and imaging studies, and to be able to send and receive messages from me.  Note that Care Thread messages are typically read during office hours only and may take 1-7 business days before a response depending on the urgency of the situation and how busy my clinic schedule is.  In general, Care Thread messaging is for non-urgent matters that do not require immediate attention, so for urgent matters that cannot wait, you are advised to go to an urgent care.

## 2022-11-04 ENCOUNTER — PHARMACY VISIT (OUTPATIENT)
Dept: PHARMACY | Facility: MEDICAL CENTER | Age: 27
End: 2022-11-04
Payer: COMMERCIAL

## 2022-11-04 PROCEDURE — RXMED WILLOW AMBULATORY MEDICATION CHARGE: Performed by: INTERNAL MEDICINE

## 2022-11-04 RX ORDER — INFLUENZA A VIRUS A/BRISBANE/02/2018 IVR-190 (H1N1) ANTIGEN (FORMALDEHYDE INACTIVATED), INFLUENZA A VIRUS A/KANSAS/14/2017 X-327 (H3N2) ANTIGEN (FORMALDEHYDE INACTIVATED), INFLUENZA B VIRUS B/PHUKET/3073/2013 ANTIGEN (FORMALDEHYDE INACTIVATED), AND INFLUENZA B VIRUS B/MARYLAND/15/2016 BX-69A ANTIGEN (FORMALDEHYDE INACTIVATED) 15; 15; 15; 15 UG/.5ML; UG/.5ML; UG/.5ML; UG/.5ML
INJECTION, SUSPENSION INTRAMUSCULAR
Qty: 0.5 ML | Refills: 0 | Status: SHIPPED | OUTPATIENT
Start: 2022-11-04 | End: 2022-12-29

## 2022-11-11 ENCOUNTER — TELEMEDICINE (OUTPATIENT)
Dept: BEHAVIORAL HEALTH | Facility: CLINIC | Age: 27
End: 2022-11-11
Payer: COMMERCIAL

## 2022-11-11 DIAGNOSIS — F41.1 GAD (GENERALIZED ANXIETY DISORDER): ICD-10-CM

## 2022-11-11 DIAGNOSIS — F33.2 SEVERE EPISODE OF RECURRENT MAJOR DEPRESSIVE DISORDER, WITHOUT PSYCHOTIC FEATURES (HCC): ICD-10-CM

## 2022-11-11 PROCEDURE — 90837 PSYTX W PT 60 MINUTES: CPT | Mod: 95 | Performed by: MARRIAGE & FAMILY THERAPIST

## 2022-11-11 NOTE — PROGRESS NOTES
[facility]   Therapy Progress Note      This visit was conducted via Zoom using secure and encrypted videoconferencing technology.  The patient was in a private location in the state of [place].  The patient's identity was confirmed and verbal consent was obtained for this virtual visit.  Place of Service: POS 10 -The patient is at Home during their visit          Name: Isa Padgett  MRN: [id number]  : 1995  Age: 27 y.o.  Date of assessment: 2022  PCP: BONI Leigh  Persons in attendance: Patient  Total session time: 55 minutes    Objective Observations:   Participation:Active verbal participation, Attentive, and Engaged   Grooming:Casual   Cognition:Alert and Fully Oriented   Eye Contact:Good   Mood:Anxious   Affect:Flexible, Full range, and Congruent with content   Thought Process:Logical and Goal-directed   Speech:Rate within normal limits and Volume within normal limits    Current Risk:   Suicide: low   Homicide: low   Self-Harm: low   Relapse: low   Safety Plan Reviewed: not applicable    Topics addressed in psychotherapy include: 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 The patient feels that her sister in law likes to place herself in supervisory roles. Discussed patients level of work responsibilities and extra work that she does. Discussed effort and acceptable levels of commitment in a job. Worked with the patient on letting go of mundane issues after they are revealed and discussed..     Care Plan Updated: No    Does patient express agreement with the above plan? Yes     Diagnosis:  1. PATIENCE (generalized anxiety disorder)    2. Severe episode of recurrent major depressive disorder, without psychotic features (HCC)        Referral appointment(s) scheduled? No       MARIUM Rico

## 2022-12-19 PROCEDURE — RXMED WILLOW AMBULATORY MEDICATION CHARGE: Performed by: PSYCHIATRY & NEUROLOGY

## 2022-12-21 ENCOUNTER — PHARMACY VISIT (OUTPATIENT)
Dept: PHARMACY | Facility: MEDICAL CENTER | Age: 27
End: 2022-12-21
Payer: COMMERCIAL

## 2022-12-29 ENCOUNTER — TELEMEDICINE (OUTPATIENT)
Dept: MEDICAL GROUP | Facility: MEDICAL CENTER | Age: 27
End: 2022-12-29
Payer: COMMERCIAL

## 2022-12-29 DIAGNOSIS — F41.1 GAD (GENERALIZED ANXIETY DISORDER): ICD-10-CM

## 2022-12-29 DIAGNOSIS — K21.00 GASTROESOPHAGEAL REFLUX DISEASE WITH ESOPHAGITIS WITHOUT HEMORRHAGE: ICD-10-CM

## 2022-12-29 DIAGNOSIS — F33.2 SEVERE EPISODE OF RECURRENT MAJOR DEPRESSIVE DISORDER, WITHOUT PSYCHOTIC FEATURES (HCC): ICD-10-CM

## 2022-12-29 DIAGNOSIS — I73.00 RAYNAUD'S SYNDROME WITHOUT GANGRENE: ICD-10-CM

## 2022-12-29 DIAGNOSIS — M35.9 UNDIFFERENTIATED CONNECTIVE TISSUE DISEASE (HCC): ICD-10-CM

## 2022-12-29 PROCEDURE — 99213 OFFICE O/P EST LOW 20 MIN: CPT | Mod: 95 | Performed by: NURSE PRACTITIONER

## 2022-12-29 RX ORDER — OMEPRAZOLE 40 MG/1
40 CAPSULE, DELAYED RELEASE ORAL DAILY
Qty: 90 CAPSULE | Refills: 3 | Status: SHIPPED | OUTPATIENT
Start: 2022-12-29 | End: 2024-01-04 | Stop reason: SDUPTHER

## 2022-12-29 ASSESSMENT — PATIENT HEALTH QUESTIONNAIRE - PHQ9
1. LITTLE INTEREST OR PLEASURE IN DOING THINGS: SEVERAL DAYS
4. FEELING TIRED OR HAVING LITTLE ENERGY: SEVERAL DAYS
8. MOVING OR SPEAKING SO SLOWLY THAT OTHER PEOPLE COULD HAVE NOTICED. OR THE OPPOSITE, BEING SO FIGETY OR RESTLESS THAT YOU HAVE BEEN MOVING AROUND A LOT MORE THAN USUAL: NOT AT ALL
SUM OF ALL RESPONSES TO PHQ9 QUESTIONS 1 AND 2: 2
9. THOUGHTS THAT YOU WOULD BE BETTER OFF DEAD, OR OF HURTING YOURSELF: NOT AT ALL
SUM OF ALL RESPONSES TO PHQ QUESTIONS 1-9: 9
2. FEELING DOWN, DEPRESSED, IRRITABLE, OR HOPELESS: SEVERAL DAYS
3. TROUBLE FALLING OR STAYING ASLEEP OR SLEEPING TOO MUCH: SEVERAL DAYS
6. FEELING BAD ABOUT YOURSELF - OR THAT YOU ARE A FAILURE OR HAVE LET YOURSELF OR YOUR FAMILY DOWN: SEVERAL DAYS
5. POOR APPETITE OR OVEREATING: SEVERAL DAYS
7. TROUBLE CONCENTRATING ON THINGS, SUCH AS READING THE NEWSPAPER OR WATCHING TELEVISION: NEARLY EVERY DAY

## 2022-12-29 NOTE — PROGRESS NOTES
Telemedicine Video Visit: Established Patient   This Remote Face to Face encounter was conducted via Zoom. Given the importance of social distancing and other strategies recommended to reduce the risk of COVID-19 transmission, I am providing medical care to this patient via audio/video visit in place of an in person visit at the request of the patient. Verbal consent to telehealth, risks, benefits, and consequences were discussed. Patient retains the right to withdraw at any time. All existing confidentiality protections apply. The patient has access to all transmitted medical information. No dissemination of any patient images or information to other entities without further written consent.  Subjective:     Chief Complaint   Patient presents with    Gastrophageal Reflux       Isa Padgett is a 27 y.o. female presenting for evaluation and management of:    Presents today for annual follow-up.  Last office visit was in May 2022 for lab results.  She is up-to-date on recommended screenings and immunizations at this time.    Patient presents today for refills on her omeprazole.  States medication is working very effectively.    Anxiety and depression  Continues to follow-up with behavioral health and is compliant with her medications.    Undifferentiated connective tissue disease/Raynaud's syndrome  Clinically stable at this time.  Not on any medication.  Followed by rheumatology.      ROS see above  Denies any recent fevers or chills. No nausea or vomiting. No chest pains or shortness of breath.     No Known Allergies    Current medicines (including changes today)  Current Outpatient Medications   Medication Sig Dispense Refill    omeprazole (PRILOSEC) 40 MG delayed-release capsule Take 1 Capsule by mouth every day. 90 Capsule 3    buPROPion (WELLBUTRIN XL) 300 MG XL tablet Take 1 Tablet by mouth every morning. Combine with 150 mg for a total daily dose of 450 mg. 90 Tablet 0    buPROPion (WELLBUTRIN  XL) 150 MG XL tablet Take 1 Tablet by mouth every morning. 90 Tablet 0    sertraline (ZOLOFT) 100 MG Tab Take 2 Tablets by mouth every day. 180 Tablet 0    clindamycin (CLEOCIN) 1 % Solution Apply to affected area on face/shoulders once or twice daliy after benzoyl peroxide wash for acne. 60 mL 3    tretinoin (RETIN-A) 0.025 % cream Apply to affected area on face/shoulders at bedtime for acne. 45 g 3    SUMAtriptan (IMITREX) 50 MG Tab Take 1 Tablet by mouth one time as needed for Migraine for up to 1 dose. 10 Tablet 3    hydrOXYzine pamoate (VISTARIL) 25 MG Cap Take 1 to 3 tablets by mouth once a day as need for insomnia or anxiety 90 Capsule 3    tacrolimus (PROTOPIC) 0.1 % Ointment Apply sparingly to affected area on face 2 times a day. 30 g 1    triamcinolone acetonide (KENALOG) 0.1 % Ointment Apply 1 Application topically 2 times a day. body 45 g 1    albuterol 108 (90 Base) MCG/ACT Aero Soln inhalation aerosol Inhale 2 Puffs by mouth every 6 hours as needed for Shortness of Breath. 8.5 g 11     No current facility-administered medications for this visit.       Patient Active Problem List    Diagnosis Date Noted    Raynaud's syndrome without gangrene 10/27/2022    PATIENCE (generalized anxiety disorder) 09/16/2022    Undifferentiated connective tissue disease (HCC) 07/15/2022    Cold intolerance 05/17/2021    Serologic autoimmunity 05/17/2021    Severe episode of recurrent major depressive disorder, without psychotic features (HCC) 03/19/2021    GUERA (obstructive sleep apnea) 05/29/2018    Insomnia 03/30/2018    Anxiety 01/23/2018    Obesity (BMI 30.0-34.9) 08/01/2017    Gastroesophageal reflux disease with esophagitis 03/21/2017    Dysthymia 11/08/2016       Family History   Problem Relation Age of Onset    Cancer Maternal Aunt         breast cancer     Hyperlipidemia Maternal Grandfather     Diabetes Maternal Grandfather         diet control    Hypertension Maternal Grandfather     Heart Disease Maternal Grandfather      Lupus Mother     Rheumatologic Disease Mother     Hypertension Father     Diabetes Maternal Uncle     Lung Disease Paternal Aunt         asthma       She  has a past medical history of Anemia (2013), Anxiety (1/23/2018), Depression, Dysthymia (11/8/2016), Infectious mononucleosis, and Nausea and vomiting in pregnancy (2/20/2013).    She has no past medical history of Addisons disease (HCC), Adrenal disorder ([facility]), Allergy, ASTHMA, Blood transfusion, CATARACT, CHF (congestive heart failure) ([facility]), Clotting disorder (HCC), COPD, Cushings syndrome ([facility]), Diabetes, Diabetic neuropathy ([facility]), EMPHYSEMA, Goiter, Headache(784.0), Heart attack (HCC), HIV (human immunodeficiency virus infection), Hyperlipidemia, IBD (inflammatory bowel disease), Kidney disease, Meningitis, Muscle disorder, OSTEOPOROSIS, Parathyroid disorder ([facility]), Pituitary disease ([facility]), Sickle cell disease ([facility]), Substance abuse ([facility]), Thyroid disease, Ulcer, or Urinary tract infection, site not specified.  She  has a past surgical history that includes orif, finger (6/24/2014) and percutaneous pinning (6/24/2014).       Objective:   Vitals obtained by patient:  There were no vitals taken for this visit.    Physical Exam:  Constitutional: Alert, no distress, well-groomed.  Skin: No rashes in visible areas.  Eye: Round. Conjunctiva clear, lids normal. No icterus.   ENMT: Lips pink without lesions, good dentition, moist mucous membranes. Phonation normal.  Neck: No masses, no thyromegaly. Moves freely without pain.  CV: Pulse as reported by patient  Respiratory: Unlabored respiratory effort, no cough or audible wheeze  Psych: Alert and oriented x3, normal affect and mood.       Assessment and Plan:   The following treatment plan was discussed:     1. Gastroesophageal reflux disease with esophagitis without hemorrhage  Stable on omeprazole.  Refills provided.  Recommend avoiding trigger foods.  - omeprazole (PRILOSEC) 40 MG delayed-release capsule; Take 1  Capsule by mouth every day.  Dispense: 90 Capsule; Refill: 3    2. Undifferentiated connective tissue disease (HCC)  Clinically stable.  Followed by rheumatology.      3. Raynaud's syndrome without gangrene  Symptoms are stable/manageable.  Followed by rheumatology.    4. PATIENCE (generalized anxiety disorder)  Chronic.  Compliant with medication.  Followed by behavioral health.    5. Severe episode of recurrent major depressive disorder, without psychotic features (HCC)  Chronic.  Compliant with medication.  Followed by behavioral health.      Follow-up: Return for Annual Preventative Exam.    Face to Face Video Visit:     BONI Leigh

## 2023-01-13 ENCOUNTER — OFFICE VISIT (OUTPATIENT)
Dept: DERMATOLOGY | Facility: IMAGING CENTER | Age: 28
End: 2023-01-13
Payer: COMMERCIAL

## 2023-01-13 DIAGNOSIS — L70.0 ACNE VULGARIS: ICD-10-CM

## 2023-01-13 PROCEDURE — 99213 OFFICE O/P EST LOW 20 MIN: CPT | Performed by: DERMATOLOGY

## 2023-01-13 RX ORDER — CLINDAMYCIN PHOSPHATE 11.9 MG/ML
SOLUTION TOPICAL
Qty: 60 ML | Refills: 3 | Status: SHIPPED | OUTPATIENT
Start: 2023-01-13 | End: 2024-01-12 | Stop reason: SDUPTHER

## 2023-01-13 NOTE — PROGRESS NOTES
CC: acne    Subjective:Previously seen patient here for fv up on face acne using Retin A 0.025% bedtime, OTC Benzoyl peroxide + clindamycin pt states doing good.    Occ flares with periods. Happy with course  Not planning pregnancy anytime soon.      History of skin cancer: No  History of biopsies:No  History of blistering/severe sunburns:No  Family history of skin cancer:No  Family history of atypical moles:No    ROS: no fevers/chills. No itch.  No cough  Relevant PMH: NC  Social: former smoker. Uses sunprotection regularly and avoids sun    PE: Gen:WDWN female in NAD. Skin: focal exam. Few acne excoriee on chin, face    A/P:   Acne, face: chronic stable  -cont past regimen:  -stop picking   -Retin A 0.025% cream QHS, se reviewed  -OTC Benzoyl peroxide + clindamycin solution Qday-BID  -f/u 1 year for refills/PRN      I have reviewed medications relevant to my specialty.

## 2023-02-09 ENCOUNTER — TELEMEDICINE (OUTPATIENT)
Dept: BEHAVIORAL HEALTH | Facility: CLINIC | Age: 28
End: 2023-02-09
Payer: COMMERCIAL

## 2023-02-09 DIAGNOSIS — F41.9 ANXIETY: ICD-10-CM

## 2023-02-09 DIAGNOSIS — F33.2 SEVERE EPISODE OF RECURRENT MAJOR DEPRESSIVE DISORDER, WITHOUT PSYCHOTIC FEATURES (HCC): ICD-10-CM

## 2023-02-09 PROCEDURE — 90833 PSYTX W PT W E/M 30 MIN: CPT | Mod: 95 | Performed by: PSYCHIATRY & NEUROLOGY

## 2023-02-09 PROCEDURE — 99214 OFFICE O/P EST MOD 30 MIN: CPT | Mod: 95 | Performed by: PSYCHIATRY & NEUROLOGY

## 2023-02-09 RX ORDER — SERTRALINE HYDROCHLORIDE 100 MG/1
200 TABLET, FILM COATED ORAL DAILY
Qty: 180 TABLET | Refills: 1 | Status: SHIPPED | OUTPATIENT
Start: 2023-02-09 | End: 2023-10-17 | Stop reason: SDUPTHER

## 2023-02-09 RX ORDER — BUPROPION HYDROCHLORIDE 300 MG/1
300 TABLET ORAL EVERY MORNING
Qty: 90 TABLET | Refills: 1 | Status: SHIPPED | OUTPATIENT
Start: 2023-02-09 | End: 2023-10-17 | Stop reason: SDUPTHER

## 2023-02-09 RX ORDER — BUPROPION HYDROCHLORIDE 150 MG/1
150 TABLET ORAL EVERY MORNING
Qty: 90 TABLET | Refills: 1 | Status: SHIPPED | OUTPATIENT
Start: 2023-02-09 | End: 2023-10-17 | Stop reason: SDUPTHER

## 2023-02-09 NOTE — PROGRESS NOTES
"[facility]  PSYCHIATRIC FOLLOW-UP NOTE    This evaluation was conducted via Zoom, using secure and encrypted videoconferencing technology. The patient was physical located at their home address in [place], [place], and the physician was located at  her home office in [place], [place]. The patient was presented by self, at home. The patient’s identity was confirmed and verbal consent for the telemedicine encounter was obtained.    CC:  Presents for follow up visit for medication evaluation and management      History Of Present Illness:  Isa Padgett is a 27 y.o. female with a history of MDD, PATIENCE, and insomnia, with comorbid medical problems including GERD, mild obstructive sleep apnea and obesity, presents today for follow up.  She is a former patient of Dr. Cerna who retired.      The patient reported the following:  She has continued the Zoloft 200 mg and Wellbutrin  mg, sometimes misses Wellbutrin on the weekends if she sleeps too long.  Has always loved to sleep, never feels rested and hard time getting up in AM but once she is up she does fine.  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  She feels tired in the evening and can overeat then b/c she doesn't want to prepare a meal and has gained a couple of lbs, fasting glucose 82.  Working on reducing negative thinking.  Her mood is low.  She denies any thoughts about death, death wish or SI.    History from 5/ 15/20 visit:  \"The patient says that her mood has been in the middle, averaging a 5 out of 10 with 10 being a great mood, she has been more irritable over the last month.  She notices that she lacks motivation and is not as interested in things as she usually is and this is been going on for about a month.  For " "example she normally lives make up and she is not interested in wearing it or using it.  She knows she ought to go to the gym and workout or workout in general and she tells herself \"if I do not have to, I do not want to.\"  She says her anxiety has been pretty well controlled.  She does not let things bother her as much as she used to.  For example running late used to really upset her and now she is able to let it roll off of her back.  Regarding her insomnia some days she is able to sleep okay and then other days she has a hard time falling asleep and then wakes up during the night.  She drinks 1 cup of coffee at least in the morning.  Educated her about caffeine and that can be in her system for up to 70 hours.\"    Psychiatric History:  Denies any hospitalizations  Denies any history of SI, SA or self harm  Dr. Cerna beginning in May 2019.  She was referred by her primary care physician.  Her last appointment was August 16, 2019.  She saw Dr. DOCKERY approximately 3 times  Medication trials: She is currently taking Zoloft 50 mg and previously tried citalopram and buspirone.  She also has a prescription for Ambien.    Family History:  Brother with anxiety, she denies any family history of bipolar disorder, schizophrenia or suicide attempts.    Social History:  The patient is from [place], she is the oldest of 4 children, her youngest sibling is 12 years old.  Her mother was a single mom and said the patient was always responsible for taking care of her siblings.  She is very protective of her younger siblings.  She says school is okay for her that she did get bullied in elementary school for her weight but she never felt vulnerable but more defensive about this.  She said it got better in middle school.  In high school she was very involved in her schoolwork but she did not like sports.  She works at a [employer] for a while and then she got pregnant with her daughter.  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    Depression screenin2021   Depression Screen (PHQ-2/PHQ-9)   PHQ-2 Total Score   2   PHQ-2 Total Score 4 0    PHQ-9 Total Score   9   PHQ-9 Total Score 13         Multiple values from one day are sorted in reverse-chronological order         REVIEW OF SYSTEMS:        Constitutional Positive - obesity   Eyes negative   Ears/Nose/Mouth/Throat negative   Cardiovascular negative   Respiratory Positive - GUERA   Gastrointestinal negative   Genitourinary negative   Muscular negative   Integumentary negative   Neurological negative   Endocrine negative   Hematologic/Lymphatic negative       Physical Examination and Psychiatric Mental Status Examination:  Vital signs: There were no vitals taken for this visit.    CONSTITUTIONAL:  General Appearance:  Clean, casual attire, good eye contact, engaged with provider    ORIENTATION:  Oriented to time, place and person  RECENT AND REMOTE MEMORY:  Grossly intact  ATTENTION SPAN AND CONCENTRATION:  within normal range  LANGUAGE:  no deficits appreciated  FUND OF KNOWLEDGE:  has awareness of current events, past history and normal vocabulary  SPEECH:  normal volume, amount, rate and articulation, no perseveration or paucity of language  MOOD:  Depressed  AFFECT:  Mood congruent  THOUGHT PROCESS:  logical and goal directed  THOUGHT CONTENT:  Denies any SI/HI or AVH, no delusional thinking nor preoccupations appreciated  ASSOCIATIONS:  Intact, not loose, no tangentiality or circumstantiality  MEMORY:  No gross evidence of memory deficits  JUDGMENT:  adequate concerning everyday activities  INSIGHT:  adequate to psychiatric condition        DIAGNOSTIC IMPRESSION:  1. Severe episode of recurrent major depressive disorder, without  psychotic features (HCC)  - buPROPion (WELLBUTRIN XL) 300 MG XL tablet; Take 1 Tablet by mouth every morning. Combine with 150 mg for a total daily dose of 450 mg.  Dispense: 90 Tablet; Refill: 1  - buPROPion (WELLBUTRIN XL) 150 MG XL tablet; Take 1 Tablet by mouth every morning.  Dispense: 90 Tablet; Refill: 1  - sertraline (ZOLOFT) 100 MG Tab; Take 2 Tablets by mouth every day.  Dispense: 180 Tablet; Refill: 1    2. Anxiety  - sertraline (ZOLOFT) 100 MG Tab; Take 2 Tablets by mouth every day.  Dispense: 180 Tablet; Refill: 1       Asssessment and Plan:  1. Major depression, recurrent, severe, worsening  Will be mindful she does not believe she has Bipolar DO II after reviewing the symptoms at her 9/16/22 visit, she believes those symptoms stem from anxiety  2. PATIENCE, stable  R/o ADHD  R/o Binge eating disorder  NEXT VISIT SCREEN FOR BINGE EATING AND/OR ADHD, and plan to start stimulant if positive, note that her fasting glucose is well WNL at 82  Genesight testing: minimal genetic markers, none for Zoloft or Wellbutrin  Continue Sertraline at 200 mg, denies any SE  Continue Wellbutrin XL at 450 mg, did not decrease, had increased from 300 mg at her 11/12/21 visit  Make f/u therapy appts, in individual therapy for MDD, PATIENCE, and help with being assertive, working with Rock Ferraro  Previously reviewed daily intentional breathing practice to reduce anxiety   Reviewed prior visit HPI, histories and treatment plan in preparation for today's visit       3.  GUERA, MILD, not controlled  Encouraged her to seek treatment for her GUERA between now and her next visit with this MD and she agreed to do so   Note that she works for [employer]/sleep medicine    4. Obesity, r/o binge eating disorder, worsening    5. Insomnia, resolved  Continue reduced caffeine intake  Continue melatonin 5 mg 1/2 to 1 up to 3 hours before bedtime, has gummy and it works   Not taking: Trazodone 50 mg, groggy next day but got a good night's  "rest  Not taking Hydroxyzine Pamoate 25 mg 1 to 3 PRN insomnia or anxiety    Follow up in 4 weeks or call sooner PRN    Risks, benefits, alternatives and side effects were discussed for all medicines prescribed at this visit.  The patient voiced understanding.  The patient agrees to call the clinic with any questions or concerns, or seek emergent medical care if warranted.    The patient has a safety plan that includes calling the 1-800 crisis line number, calling 911 and/or going to the nearest Emergency Department if symptoms worsen.    The proposed treatment plan was discussed with the patient who was provided the opportunity to ask questions and make suggestions regarding alternative treatment. The patient verbalized understanding and expressed agreement with the plan.    Greater than 16 minutes of the visit was spent in psychotherapy.     Psychotherapy include:  Supportive psychotherapy and psychoeducation, topics: ambivalent feelings about different things, demands of work, feeling tired on the weekends, \"the critic\" and working to grow loving side and shrink the critic.          Frances Forbes M.D.    This note was created using voice recognition software (Dragon). The accuracy of the dictation is limited by the abilities of the software. I have reviewed the note prior to signing, however some errors in grammar and context are still possible. If you have any questions related to this note please do not hesitate to contact our office.   "

## 2023-03-17 ENCOUNTER — APPOINTMENT (OUTPATIENT)
Dept: BEHAVIORAL HEALTH | Facility: CLINIC | Age: 28
End: 2023-03-17
Payer: COMMERCIAL

## 2023-03-23 ENCOUNTER — TELEMEDICINE (OUTPATIENT)
Dept: BEHAVIORAL HEALTH | Facility: CLINIC | Age: 28
End: 2023-03-23
Payer: COMMERCIAL

## 2023-03-23 DIAGNOSIS — F41.1 GAD (GENERALIZED ANXIETY DISORDER): ICD-10-CM

## 2023-03-23 DIAGNOSIS — G47.00 INSOMNIA, UNSPECIFIED TYPE: ICD-10-CM

## 2023-03-23 DIAGNOSIS — F33.2 SEVERE EPISODE OF RECURRENT MAJOR DEPRESSIVE DISORDER, WITHOUT PSYCHOTIC FEATURES (HCC): ICD-10-CM

## 2023-03-23 PROCEDURE — 99214 OFFICE O/P EST MOD 30 MIN: CPT | Mod: 95 | Performed by: PSYCHIATRY & NEUROLOGY

## 2023-03-23 PROCEDURE — 90833 PSYTX W PT W E/M 30 MIN: CPT | Mod: 95 | Performed by: PSYCHIATRY & NEUROLOGY

## 2023-03-23 ASSESSMENT — PATIENT HEALTH QUESTIONNAIRE - PHQ9
CLINICAL INTERPRETATION OF PHQ2 SCORE: 0
5. POOR APPETITE OR OVEREATING: 1 - SEVERAL DAYS

## 2023-03-23 NOTE — PROGRESS NOTES
"[facility]  PSYCHIATRIC FOLLOW-UP NOTE    This evaluation was conducted via Zoom, using secure and encrypted videoconferencing technology. The patient was physical located at their home address in [place], [place], and the physician was located at  her home office in [place], [place]. The patient was presented by self, at home. The patient’s identity was confirmed and verbal consent for the telemedicine encounter was obtained.    CC:  Presents for follow up visit for medication evaluation and management      History Of Present Illness:  Isa Padgett is a 27 y.o. female with a history of MDD, PATIENCE, and insomnia, with comorbid medical problems including GERD, mild obstructive sleep apnea and obesity, presents today for follow up.  She is a former patient of Dr. Cerna who retired.    The patient reported the following:  She has been more consistent with taking her medications and this has helped her mood, anxiety and her sleep and helped her to feel less tired during the day.  She is taking Zoloft 200 mg and Wellbutrin  mg.  No SE.  She still \"over thinks things\" but resolves her thoughts by being forgiving of herself, thoughts about being a parent and whether to be more authoritative v. Permissive.  Her parents were very strict and she had significant responsibility from a young age.    History from 5/ 15/20 visit:  \"The patient says that her mood has been in the middle, averaging a 5 out of 10 with 10 being a great mood, she has been more irritable over the last month.  She notices that she lacks motivation and is not as interested in things as she usually is and this is been going on for about a month.  For example she normally lives make up and she is not interested in wearing it or using it.  She knows she ought to go to the gym and workout or workout in general and she tells herself \"if I do not have to, I do not want to.\"  She says her anxiety " "has been pretty well controlled.  She does not let things bother her as much as she used to.  For example running late used to really upset her and now she is able to let it roll off of her back.  Regarding her insomnia some days she is able to sleep okay and then other days she has a hard time falling asleep and then wakes up during the night.  She drinks 1 cup of coffee at least in the morning.  Educated her about caffeine and that can be in her system for up to 70 hours.\"    Psychiatric History:  Denies any hospitalizations  Denies any history of SI, SA or self harm  Dr. Cerna beginning in May 2019.  She was referred by her primary care physician.  Her last appointment was August 16, 2019.  She saw Dr. DOCKERY approximately 3 times  Medication trials: She is currently taking Zoloft 50 mg and previously tried citalopram and buspirone.  She also has a prescription for Ambien.    Family History:  Brother with anxiety, she denies any family history of bipolar disorder, schizophrenia or suicide attempts.    Social History:  The patient is from [place], she is the oldest of 4 children, her youngest sibling is 12 years old.  Her mother was a single mom and said the patient was always responsible for taking care of her siblings.  She is very protective of her younger siblings.  She says school is okay for her that she did get bullied in elementary school for her weight but she never felt vulnerable but more defensive about this.  She said it got better in middle school.  In high school she was very involved in her schoolwork but she did not like sports.  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    Depression screening:      " 1/21/2022     9:00 AM 12/29/2022     1:53 PM 3/23/2023     2:30 PM   Depression Screen (PHQ-2/PHQ-9)   PHQ-2 Total Score  2    PHQ-2 Total Score 0  0   PHQ-9 Total Score  9          REVIEW OF SYSTEMS:        Constitutional Positive - obesity   Eyes negative   Ears/Nose/Mouth/Throat negative   Cardiovascular negative   Respiratory Positive - GUERA   Gastrointestinal negative   Genitourinary negative   Muscular negative   Integumentary negative   Neurological negative   Endocrine negative   Hematologic/Lymphatic negative       Physical Examination and Psychiatric Mental Status Examination:  Vital signs: There were no vitals taken for this visit.    CONSTITUTIONAL:  General Appearance:  Clean, casual attire, good eye contact, engaged with provider    ORIENTATION:  Oriented to time, place and person  RECENT AND REMOTE MEMORY:  Grossly intact  ATTENTION SPAN AND CONCENTRATION:  within normal range  LANGUAGE:  no deficits appreciated  FUND OF KNOWLEDGE:  has awareness of current events, past history and normal vocabulary  SPEECH:  normal volume, amount, rate and articulation, no perseveration or paucity of language  MOOD:  Neutral  AFFECT:  Mildly constricted  THOUGHT PROCESS:  logical and goal directed  THOUGHT CONTENT:  Denies any SI/HI or AVH, no delusional thinking nor preoccupations appreciated  ASSOCIATIONS:  Intact, not loose, no tangentiality or circumstantiality  MEMORY:  No gross evidence of memory deficits  JUDGMENT:  adequate concerning everyday activities  INSIGHT:  adequate to psychiatric condition        DIAGNOSTIC IMPRESSION:  1. Severe episode of recurrent major depressive disorder, without psychotic features (HCC)    2. PATIENCE (generalized anxiety disorder)    3. Insomnia, unspecified type         Asssessment and Plan:  1. Major depression, recurrent, severe, improving.  Will be mindful she does not believe she has Bipolar DO II after reviewing the symptoms at her 9/16/22 visit, she believes those symptoms  "stem from anxiety  2. PATIENCE, stable  R/o ADHD  R/o Binge eating disorder  Genesight testing: minimal genetic markers, none for Zoloft or Wellbutrin  Continue Sertraline at 200 mg, denies any SE  Continue Wellbutrin XL at 450 mg, did not decrease, had increased from 300 mg at her 11/12/21 visit  Make f/u therapy appts, in individual therapy for MDD, PATIENCE, and help with being assertive, working with Rock Dislajose elilia  Previously reviewed daily intentional breathing practice to reduce anxiety   Reviewed prior visit HPI, histories and treatment plan in preparation for today's visit         3.  GEURA, MILD, not controlled  Her office told her \"yours is mild\" and she isn't feeling as tired now and so may table it. Would have to go through the entire process again to pursue getting a sleep mask   Note that she works for [employer]/sleep medicine    4. Obesity, r/o binge eating disorder, improving    5. Insomnia, resolved  Continue reduced caffeine intake  Continue melatonin 5 mg 1/2 to 1 up to 3 hours before bedtime, has gummy and it works   Not taking: Trazodone 50 mg, groggy next day but got a good night's rest  Not taking Hydroxyzine Pamoate 25 mg 1 to 3 PRN insomnia or anxiety    Follow up in 12 weeks or call sooner PRN    Risks, benefits, alternatives and side effects were discussed for all medicines prescribed at this visit.  The patient voiced understanding.  The patient agrees to call the clinic with any questions or concerns, or seek emergent medical care if warranted.    The patient has a safety plan that includes calling the 1-800 crisis line number, calling 911 and/or going to the nearest Emergency Department if symptoms worsen.    The proposed treatment plan was discussed with the patient who was provided the opportunity to ask questions and make suggestions regarding alternative treatment. The patient verbalized understanding and expressed agreement with the plan.    Greater than 16 minutes of the visit was " spent in psychotherapy.     Psychotherapy include:  Supportive psychotherapy and psychoeducation, topics: co-parenting, type of parenting she had growing up v. Her parenting and finding the right balance.  GUERA.  Weight loss. Completed PHQ9.        Frances Forbes M.D.    This note was created using voice recognition software (Dragon). The accuracy of the dictation is limited by the abilities of the software. I have reviewed the note prior to signing, however some errors in grammar and context are still possible. If you have any questions related to this note please do not hesitate to contact our office.

## 2023-03-27 ENCOUNTER — PATIENT MESSAGE (OUTPATIENT)
Dept: RHEUMATOLOGY | Facility: MEDICAL CENTER | Age: 28
End: 2023-03-27
Payer: COMMERCIAL

## 2023-03-27 DIAGNOSIS — M35.9 UNDIFFERENTIATED CONNECTIVE TISSUE DISEASE (HCC): ICD-10-CM

## 2023-04-07 PROCEDURE — RXMED WILLOW AMBULATORY MEDICATION CHARGE: Performed by: PSYCHIATRY & NEUROLOGY

## 2023-04-07 PROCEDURE — RXMED WILLOW AMBULATORY MEDICATION CHARGE: Performed by: DERMATOLOGY

## 2023-04-08 ENCOUNTER — PHARMACY VISIT (OUTPATIENT)
Dept: PHARMACY | Facility: MEDICAL CENTER | Age: 28
End: 2023-04-08
Payer: COMMERCIAL

## 2023-05-10 ENCOUNTER — TELEMEDICINE (OUTPATIENT)
Dept: BEHAVIORAL HEALTH | Facility: CLINIC | Age: 28
End: 2023-05-10
Payer: COMMERCIAL

## 2023-05-10 DIAGNOSIS — F41.1 GAD (GENERALIZED ANXIETY DISORDER): ICD-10-CM

## 2023-05-10 DIAGNOSIS — G47.00 INSOMNIA, UNSPECIFIED TYPE: ICD-10-CM

## 2023-05-10 DIAGNOSIS — F33.2 SEVERE EPISODE OF RECURRENT MAJOR DEPRESSIVE DISORDER, WITHOUT PSYCHOTIC FEATURES (HCC): ICD-10-CM

## 2023-05-10 PROCEDURE — 99214 OFFICE O/P EST MOD 30 MIN: CPT | Mod: 95 | Performed by: PSYCHIATRY & NEUROLOGY

## 2023-05-10 PROCEDURE — 90833 PSYTX W PT W E/M 30 MIN: CPT | Mod: 95 | Performed by: PSYCHIATRY & NEUROLOGY

## 2023-05-10 NOTE — PROGRESS NOTES
"[facility]  PSYCHIATRIC FOLLOW-UP NOTE    This evaluation was conducted via Zoom, using secure and encrypted videoconferencing technology. The patient was physical located at their home address in [place], [place], and the physician was located at  her home office in [place], [place]. The patient was presented by self, at home. The patient’s identity was confirmed and verbal consent for the telemedicine encounter was obtained.    CC:  Presents for follow up visit for medication evaluation and management      History Of Present Illness:  Isa Padgett is a 27 y.o. female with a history of MDD, PATIENCE, and insomnia, with comorbid medical problems including GERD, mild obstructive sleep apnea and obesity, presents today for follow up.  She is a former patient of Dr. Cerna who retired.    The patient reported the following:  Her mood has been good.  She is taking Zoloft 200 mg and Wellbutrin  mg.  No SE.  She still believes that she \"over thinks things\" and has to be impulsive to make decisions.  She is contemplating a job change.  She is sleeping well.      History from 5/ 15/20 visit:  \"The patient says that her mood has been in the middle, averaging a 5 out of 10 with 10 being a great mood, she has been more irritable over the last month.  She notices that she lacks motivation and is not as interested in things as she usually is and this is been going on for about a month.  For example she normally lives make up and she is not interested in wearing it or using it.  She knows she ought to go to the gym and workout or workout in general and she tells herself \"if I do not have to, I do not want to.\"  She says her anxiety has been pretty well controlled.  She does not let things bother her as much as she used to.  For example running late used to really upset her and now she is able to let it roll off of her back.  Regarding her insomnia some days she is " "able to sleep okay and then other days she has a hard time falling asleep and then wakes up during the night.  She drinks 1 cup of coffee at least in the morning.  Educated her about caffeine and that can be in her system for up to 70 hours.\"    Psychiatric History:  Denies any hospitalizations  Denies any history of SI, SA or self harm  Dr. Cerna beginning in May 2019.  She was referred by her primary care physician.  Her last appointment was 2019.  She saw Dr. DOCKERY approximately 3 times  Medication trials: She is currently taking Zoloft 50 mg and previously tried citalopram and buspirone.  She also has a prescription for Ambien.    Family History:  Brother with anxiety, she denies any family history of bipolar disorder, schizophrenia or suicide attempts.    Social History:  The patient is from [place], she is the oldest of 4 children, her youngest sibling is 12 years old.  Her mother was a single mom and said the patient was always responsible for taking care of her siblings.  She is very protective of her younger siblings.  She says school is okay for her that she did get bullied in elementary school for her weight but she never felt vulnerable but more defensive about this.  She said it got better in middle school.  In high school she was very involved in her schoolwork but she did not like sports.  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    Depression screenin/21/2022     9:00 AM 2022     1:53 PM 3/23/2023     2:30 PM   Depression Screen (PHQ-2/PHQ-9)   PHQ-2 Total Score  2    PHQ-2 Total Score 0  0   PHQ-9 Total Score  9          REVIEW OF SYSTEMS:        Constitutional Positive - " obesity   Eyes negative   Ears/Nose/Mouth/Throat negative   Cardiovascular negative   Respiratory Positive - GUERA   Gastrointestinal negative   Genitourinary negative   Muscular negative   Integumentary negative   Neurological negative   Endocrine negative   Hematologic/Lymphatic negative       Physical Examination and Psychiatric Mental Status Examination:  Vital signs: There were no vitals taken for this visit.    CONSTITUTIONAL:  General Appearance:  Clean, casual attire, good eye contact, engaged with provider    ORIENTATION:  Oriented to time, place and person  RECENT AND REMOTE MEMORY:  Grossly intact  ATTENTION SPAN AND CONCENTRATION:  within normal range  LANGUAGE:  no deficits appreciated  FUND OF KNOWLEDGE:  has awareness of current events, past history and normal vocabulary  SPEECH:  normal volume, amount, rate and articulation, no perseveration or paucity of language  MOOD:  Neutral  AFFECT:  Mood congruent  THOUGHT PROCESS:  logical and goal directed  THOUGHT CONTENT:  Denies any SI/HI or AVH, no delusional thinking nor preoccupations appreciated  ASSOCIATIONS:  Intact, not loose, no tangentiality or circumstantiality  MEMORY:  No gross evidence of memory deficits  JUDGMENT:  adequate concerning everyday activities  INSIGHT:  adequate to psychiatric condition        DIAGNOSTIC IMPRESSION:  There are no diagnoses linked to this encounter.         Asssessment and Plan:  1. Major depression, recurrent, severe, improving.  Will be mindful she does not believe she has Bipolar DO II after reviewing the symptoms at her 9/16/22 visit, she believes those symptoms stem from anxiety  2. PATIENCE, stable  R/o ADHD  R/o Binge eating disorder  Genesight testing: minimal genetic markers, none for Zoloft or Wellbutrin  Continue Sertraline at 200 mg, denies any SE  Continue Wellbutrin XL at 450 mg, did not decrease, had increased from 300 mg at her 11/12/21 visit  Continue in individual therapy for MDD, PATIENCE, and help with  "being assertive, working with Rock Ferraro  Reviewed again: daily intentional breathing practice to reduce anxiety   Reviewed prior visit HPI, histories and treatment plan in preparation for today's visit       3.  GUERA, MILD, not controlled  Her office told her \"yours is mild\" and she isn't feeling as tired now and so may table it. 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 Obesity, r/o binge eating disorder, improving    5. Insomnia, resolved  Continue reduced caffeine intake  Continue melatonin 5 mg 1/2 to 1 up to 3 hours before bedtime, has gummy and it works   Not taking: Trazodone 50 mg, groggy next day but got a good night's rest  Not taking Hydroxyzine Pamoate 25 mg 1 to 3 PRN insomnia or anxiety    Follow up in 12 weeks or call sooner PRN    Risks, benefits, alternatives and side effects were discussed for all medicines prescribed at this visit.  The patient voiced understanding.  The patient agrees to call the clinic with any questions or concerns, or seek emergent medical care if warranted.    The patient has a safety plan that includes calling the 1-800 crisis line number, calling 911 and/or going to the nearest Emergency Department if symptoms worsen.    The proposed treatment plan was discussed with the patient who was provided the opportunity to ask questions and make suggestions regarding alternative treatment. The patient verbalized understanding and expressed agreement with the plan.    Greater than 16 minutes of the visit was spent in psychotherapy.     Psychotherapy include:  Supportive psychotherapy and psychoeducation, topics: contemplating change of jobs, is it ambivalence or \"thinking too much\" is it impulsivity or is it taking opportunities? Critical side v. [place] side.          Frances Forbes M.D.    This note was created using voice recognition software (Dragon). The accuracy of the " dictation is limited by the abilities of the software. I have reviewed the note prior to signing, however some errors in grammar and context are still possible. If you have any questions related to this note please do not hesitate to contact our office.

## 2023-05-12 ENCOUNTER — TELEMEDICINE (OUTPATIENT)
Dept: BEHAVIORAL HEALTH | Facility: CLINIC | Age: 28
End: 2023-05-12
Payer: COMMERCIAL

## 2023-05-12 DIAGNOSIS — F41.1 GAD (GENERALIZED ANXIETY DISORDER): ICD-10-CM

## 2023-05-12 DIAGNOSIS — F33.2 SEVERE EPISODE OF RECURRENT MAJOR DEPRESSIVE DISORDER, WITHOUT PSYCHOTIC FEATURES (HCC): ICD-10-CM

## 2023-05-12 PROCEDURE — 90837 PSYTX W PT 60 MINUTES: CPT | Mod: 95 | Performed by: MARRIAGE & FAMILY THERAPIST

## 2023-05-12 PROCEDURE — 1125F AMNT PAIN NOTED PAIN PRSNT: CPT | Mod: 95 | Performed by: MARRIAGE & FAMILY THERAPIST

## 2023-05-12 NOTE — PROGRESS NOTES
[facility]   Therapy Progress Note      This visit was conducted via Zoom using secure and encrypted videoconferencing technology.  The patient was in a private location in the [facility] [place].  The patient's identity was confirmed and verbal consent was obtained for this virtual visit.  Place of Service: POS 10 -The patient is at Home during their visit           Name: Isa Padgett  MRN: [id number]  : 1995  Age: 27 y.o.  Date of assessment: 2023  PCP: BONI Leigh  Persons in attendance: Patient  Total session time: 55 minutes    Objective Observations:   Participation:Active verbal participation, Attentive, and Engaged   Grooming:Casual   Cognition:Alert and Fully Oriented   Eye Contact:Good   Mood:Euthymic   Affect:Flexible and Full range   Thought Process:Logical and Goal-directed   Speech:Rate within normal limits and Volume within normal limits    Current Risk:   Suicide: low   Homicide: low   Self-Harm: low   Relapse: low   Safety Plan Reviewed: not applicable    Topics addressed in psychotherapy include: Met with the patient via zoom for an individual counseling session.  The patient discussed an upcoming job change.  She feels some stress as she is looking into new opportunity's rather than staying in her routine.  She reported she continues to have difficulty at work.She discussed that her brother is getting  in September to a woman she does not really get along with.  She reported that if she goes to the wedding, she might raise her hand an object.  Discussed possibly be more appropriate and speaking with her brother prior to the wedding.  Worked with the patient on managing changes and focusing on positive attitudes.  Assisted the patient in processing her upcoming changes.    This dictation has been created using voice recognition software and/or scribes. The accuracy of the dictation is limited by the abilities of the software and the  expertise of the scribes. I expect there may be some errors of grammar and possibly content. I made every attempt to manually correct the errors within my dictation. However, errors related to voice recognition software and/or scribes may still exist and should be interpreted within the appropriate context.    Care Plan Updated: No    Does patient express agreement with the above plan? Yes     Diagnosis:  1. Severe episode of recurrent major depressive disorder, without psychotic features (HCC)    2. PATIENCE (generalized anxiety disorder)        Referral appointment(s) scheduled? No       MARITZA Rico.

## 2023-07-19 PROCEDURE — RXMED WILLOW AMBULATORY MEDICATION CHARGE: Performed by: PSYCHIATRY & NEUROLOGY

## 2023-07-21 ENCOUNTER — PHARMACY VISIT (OUTPATIENT)
Dept: PHARMACY | Facility: MEDICAL CENTER | Age: 28
End: 2023-07-21
Payer: COMMERCIAL

## 2023-07-21 ENCOUNTER — HOSPITAL ENCOUNTER (OUTPATIENT)
Dept: LAB | Facility: MEDICAL CENTER | Age: 28
End: 2023-07-21
Attending: STUDENT IN AN ORGANIZED HEALTH CARE EDUCATION/TRAINING PROGRAM
Payer: COMMERCIAL

## 2023-07-21 ENCOUNTER — HOSPITAL ENCOUNTER (OUTPATIENT)
Dept: LAB | Facility: MEDICAL CENTER | Age: 28
End: 2023-07-21
Attending: OBSTETRICS & GYNECOLOGY
Payer: COMMERCIAL

## 2023-07-21 DIAGNOSIS — M35.9 UNDIFFERENTIATED CONNECTIVE TISSUE DISEASE (HCC): ICD-10-CM

## 2023-07-21 LAB
C3 SERPL-MCNC: 109.5 MG/DL (ref 87–200)
C4 SERPL-MCNC: 15.9 MG/DL (ref 19–52)
CHOLEST SERPL-MCNC: 157 MG/DL (ref 100–199)
FASTING STATUS PATIENT QL REPORTED: NORMAL
HDLC SERPL-MCNC: 56 MG/DL
LDLC SERPL CALC-MCNC: 89 MG/DL
TRIGL SERPL-MCNC: 60 MG/DL (ref 0–149)

## 2023-07-21 PROCEDURE — 80061 LIPID PANEL: CPT

## 2023-07-21 PROCEDURE — 86225 DNA ANTIBODY NATIVE: CPT

## 2023-07-21 PROCEDURE — 36415 COLL VENOUS BLD VENIPUNCTURE: CPT

## 2023-07-21 PROCEDURE — 86235 NUCLEAR ANTIGEN ANTIBODY: CPT

## 2023-07-21 PROCEDURE — 86160 COMPLEMENT ANTIGEN: CPT

## 2023-07-21 PROCEDURE — 86039 ANTINUCLEAR ANTIBODIES (ANA): CPT

## 2023-07-21 PROCEDURE — 86038 ANTINUCLEAR ANTIBODIES: CPT

## 2023-07-22 LAB — NUCLEAR IGG SER QL IA: DETECTED

## 2023-07-24 LAB
ANA INTERPRETIVE COMMENT Q5143: ABNORMAL
ANA PATTERN Q5144: ABNORMAL
ANA TITER Q5145: ABNORMAL
ANTINUCLEAR ANTIBODY (ANA), HEP-2, IGG Q5142: DETECTED

## 2023-07-24 ASSESSMENT — RHEUMATOLOGY FOLLOW-UP QUESTIONNAIRE
SUNLIGHT-INDUCED SKIN RASH: LEGS
MARK ALL THE AREAS OF PAIN: 88520153
SUNLIGHT-INDUCED SKIN RASH: FACE
COLD-INDUCED COLOR CHANGES (WHITE, PURPLE, RED ON REWARMING): TOES
TENDON PAIN: Y
EYE REDNESS: Y
SUNLIGHT-INDUCED SKIN RASH: CHEST
SUNLIGHT-INDUCED SKIN RASH: ARMS
MUSCLE PAIN: Y
FAST HEARTBEATS: Y
COLD-INDUCED COLOR CHANGES (WHITE, PURPLE, RED ON REWARMING): EARS
DEPRESSION: Y
TEMPLE PAIN: Y
ANXIETY: Y
HEADACHES: Y
JOINT SWELLING: Y
SUNLIGHT-INDUCED SKIN RASH: NECK
COLD-INDUCED COLOR CHANGES (WHITE, PURPLE, RED ON REWARMING): FINGERS
HEARTBURN: Y
INSOMNIA: Y
NUMBNESS: Y
TINGLING: Y
DURATION: 30-60 MINS
CHARACTERISTIC: SAME WITH ACTIVITY
NAUSEA: Y
CHIEF_COMPLAINT: LAB RESULTS
JOINT PAIN: SAME WITH ACTIVITY
DRY EYES: Y
COLD-INDUCED COLOR CHANGES (WHITE, PURPLE, RED ON REWARMING): NOSE
SPASMS: Y
BODY ACHES: Y

## 2023-07-25 LAB
ENA JO1 AB TITR SER: 1 AU/ML (ref 0–40)
ENA SCL70 IGG SER QL: 3 AU/ML (ref 0–40)
ENA SM IGG SER-ACNC: 2 AU/ML (ref 0–40)
ENA SS-B IGG SER IA-ACNC: 0 AU/ML (ref 0–40)
SSA52 R0ENA AB IGG Q0420: 9 AU/ML (ref 0–40)
SSA60 R0ENA AB IGG Q0419: 2 AU/ML (ref 0–40)
U1 SNRNP IGG SER QL: 4 UNITS (ref 0–19)

## 2023-07-26 ENCOUNTER — OFFICE VISIT (OUTPATIENT)
Dept: RHEUMATOLOGY | Facility: MEDICAL CENTER | Age: 28
End: 2023-07-26
Attending: STUDENT IN AN ORGANIZED HEALTH CARE EDUCATION/TRAINING PROGRAM
Payer: COMMERCIAL

## 2023-07-26 VITALS
OXYGEN SATURATION: 97 % | WEIGHT: 175.38 LBS | SYSTOLIC BLOOD PRESSURE: 110 MMHG | HEART RATE: 102 BPM | TEMPERATURE: 97.6 F | BODY MASS INDEX: 35.36 KG/M2 | HEIGHT: 59 IN | DIASTOLIC BLOOD PRESSURE: 76 MMHG

## 2023-07-26 DIAGNOSIS — M35.9 UNDIFFERENTIATED CONNECTIVE TISSUE DISEASE (HCC): ICD-10-CM

## 2023-07-26 DIAGNOSIS — I73.00 RAYNAUD'S SYNDROME WITHOUT GANGRENE: ICD-10-CM

## 2023-07-26 DIAGNOSIS — R77.8 LOW SERUM COMPLEMENT C4: ICD-10-CM

## 2023-07-26 DIAGNOSIS — R21 RASH AND NONSPECIFIC SKIN ERUPTION: ICD-10-CM

## 2023-07-26 LAB — DSDNA AB TITR SER CLIF: NORMAL {TITER}

## 2023-07-26 PROCEDURE — 99212 OFFICE O/P EST SF 10 MIN: CPT | Performed by: STUDENT IN AN ORGANIZED HEALTH CARE EDUCATION/TRAINING PROGRAM

## 2023-07-26 PROCEDURE — 3078F DIAST BP <80 MM HG: CPT | Performed by: STUDENT IN AN ORGANIZED HEALTH CARE EDUCATION/TRAINING PROGRAM

## 2023-07-26 PROCEDURE — 3074F SYST BP LT 130 MM HG: CPT | Performed by: STUDENT IN AN ORGANIZED HEALTH CARE EDUCATION/TRAINING PROGRAM

## 2023-07-26 PROCEDURE — 99214 OFFICE O/P EST MOD 30 MIN: CPT | Performed by: STUDENT IN AN ORGANIZED HEALTH CARE EDUCATION/TRAINING PROGRAM

## 2023-07-26 NOTE — PROGRESS NOTES
[facility] RHEUMATOLOGY  [address] [facility], [address], [place] [address]  Phone: [phone] ? Fax: [phone]    RHEUMATOLOGY FOLLOW-UP VISIT NOTE      DATE OF SERVICE: 07/26/2023         Subjective     PRIMARY CARE PRACTITIONER:  BONI Leigh  [address]  [place] [address]    PATIENT IDENTIFICATION:  Isa Padgett  [address]  [facility] [address]    YOB: 1995    MEDICAL RECORD NUMBER: [id number]          CHIEF COMPLAINT:   Chief Complaint   Patient presents with    Follow-Up     Undifferentiated connective tissue disease (HCC)       RHEUMATOLOGIC HISTORY:  Isa Padgett is a 27 y.o. female with pertinent history notable for undifferentiated connective tissue disease diagnosed in 7/2022 (reportedly symptomatic since 2020), mild obstructive sleep apnea, and anxiety/depression. She initially presented on 7/15/22 for rheumatologic evaluation in the setting of positive NATIVIDAD. Reported onset of symptoms in 2020 with joint/tendon pain in her hands (mostly MCP joints), knees, and neck with occipital headaches, less than 30 minutes of morning stiffness that improved with warm bath and physical activity. Additionally reported Raynaud's phenomenon (on fingers, toes, and nose), gastric reflux with globus sensation, dry eyes with blurry vision, nonrestorative sleep, and multiple nonspecific symptoms. She had been managing with Advil as needed which provided some relief from her musculoskeletal pain.    Pertinent laboratory results: Positive NATIVIDAD 1:320<1:640<1:320 homogenous pattern with negative reflex panel (in 7/2023<5/2022<3/2021); low C4 of 15.9 with normal C3 of 109.5 (in 7/2023); negative/normal RF and anti-CCP (in 3/2021), anti-histone, anti-chromatin, HLA-B27, CRP and ESR (in 10/2022), anti-TPO and TSH (in 5/2022), HBV (in 2/2018) and HCV (in 8/2021), CMP (in 7/2020) and CBC (in 5/2022).    INTERVAL HISTORY:  [facility] Rheumatology Established  Patient History Form    7/24/2023  1:33 PM PDT - Filed by Patient   MAIN REASON FOR VISIT Lab results   INTERVAL HISTORY OF ILLNESS   Date of worsening onset: 05/2023   Preceding incident/ailment:    Describe/list your symptoms: Hand stiffness, right hand manly. Will go away after a few minutes in the morning. Now lasting longer throughout the day. Developed numbness and tingling right hand and fingers. The past two months both times I had tingling lasted all day.   Exacerbating factors:    Alleviating factors:    Helpful medications:    Ineffective medications:    Severity of pain (scale of 1-10):    Personal/emotional stressors:    Luis All The Areas Of Pain    REVIEW OF SYMPTOMS    General   Fevers    Chills    Night sweats    Malaise    Fatigue    Unintentional weight loss    Musculoskeletal   Joint pain Same with activity   Morning stiffness duration 30-60 mins   Morning stiffness characteristic Same with activity   Joint swelling Yes   Joint instability    Tendon pain Yes   Muscle pain Yes   Body aches Yes   Dermatologic   Hair loss with bald spots    Hair shedding    Skin thickening    Skin plaques    Sunlight-induced skin rash Face;  Neck;  Chest;  Arms;  Legs   Cold-induced color changes (white, purple, red on rewarming) Fingers;  Toes;  Ears;  Nose   Neurologic/Psychiatric   Weakness    Spasms Yes   Tingling Yes   Burning    Numbness Yes   Insomnia Yes   Anxiety Yes   Depression Yes   Head/Eyes   Headaches Yes   Temple pain Yes   Dizziness    Dry eyes Yes   Eye pain    Eye redness Yes   Blurry vision    Vision loss    Ears/Nose   Ear pain    Ringing in ears    Vertigo    Hearing loss    Nasal ulcers    Nosebleeds    Sinus pain    Nasal congestion    Snoring    Mouth/Throat   Oral ulcers    Bleeding gums    Dry mouth    Cavities    Sore throat    Sticking in throat    Difficulty speaking    Neck/Lymphatics   Thyroid pain    Thyroid swelling    Lymph node swelling    Cardiac/Respiratory   Chest pain with  breathing    Dry cough    Cough with bloody phlegm    Shortness of breath    Fast heartbeats Yes   Irregular heartbeats    Gastrointestinal   Nausea Yes   Vomiting    Difficulty swallowing    Heartburn Yes   Abdominal pain    Bloody stool    Mucus stool    Genitourinary   Pelvic pain    Genital ulcers    Abnormal discharge    Burning urination    Frothy urine    Blood in urine        REVIEW OF SYSTEMS:  Except as noted in the history above, relevant review of systems with emphasis on autoimmune rheumatic diseases was otherwise negative.      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    Severe episode of recurrent major depressive disorder, without psychotic features ([facility]) 03/19/2021    GUERA (obstructive sleep apnea) 05/29/2018    Insomnia 03/30/2018    Anxiety 01/23/2018    Obesity (BMI 30.0-34.9) 08/01/2017    Gastroesophageal reflux disease with esophagitis 03/21/2017    Dysthymia 11/08/2016       PAST MEDICAL HISTORY:  Past Medical History:   Diagnosis Date    Anemia 2013    Anxiety 1/23/2018    Depression     Dysthymia 11/8/2016    Infectious mononucleosis     Nausea and vomiting in pregnancy 2/20/2013       PAST SURGICAL HISTORY:  Past Surgical History:   Procedure Laterality Date    ORIF, FINGER  6/24/2014    Performed by Ed Espitia M.D. at [facility]    PERCUTANEOUS PINNING  6/24/2014    Performed by Ed Espitia M.D. at [facility]       MEDICATIONS:  Current Outpatient Medications   Medication Sig    buPROPion (WELLBUTRIN XL) 300 MG XL tablet Take 1 Tablet by mouth every morning. Combine with 150 mg for a total daily dose of 450 mg.    buPROPion (WELLBUTRIN XL) 150 MG XL  tablet Take 1 Tablet by mouth every morning.    sertraline (ZOLOFT) 100 MG Tab Take 2 Tablets by mouth every day.    tretinoin (RETIN-A) 0.025 % cream Apply to affected area on face/shoulders at bedtime for acne.    clindamycin (CLEOCIN) 1 % Solution Apply to affected area on face/shoulders once or twice daliy after benzoyl peroxide wash for acne.    omeprazole (PRILOSEC) 40 MG delayed-release capsule Take 1 Capsule by mouth every day.    SUMAtriptan (IMITREX) 50 MG Tab Take 1 Tablet by mouth one time as needed for Migraine for up to 1 dose.    hydrOXYzine pamoate (VISTARIL) 25 MG Cap Take 1 to 3 tablets by mouth once a day as need for insomnia or anxiety    tacrolimus (PROTOPIC) 0.1 % Ointment Apply sparingly to affected area on face 2 times a day.    triamcinolone acetonide (KENALOG) 0.1 % Ointment Apply 1 Application topically 2 times a day. body    albuterol 108 (90 Base) MCG/ACT Aero Soln inhalation aerosol Inhale 2 Puffs by mouth every 6 hours as needed for Shortness of Breath.       ALLERGIES:   No Known Allergies    IMMUNIZATIONS:  Immunization History   Administered Date(s) Administered    DTP - Historical vaccine 01/13/1994, 03/09/1994, 05/10/1994    Dtap Vaccine 01/13/1994, 03/09/1994, 05/10/1994, 02/01/1995, 03/10/1998    HIB Vaccine (ACTHIB/HIBERIX) 01/13/1994, 03/09/1994, 05/10/1994, 02/01/1995    HPV Quadrivalent Vaccine (GARDASIL) - HISTORICAL DATA 05/21/2009, 07/23/2009, 11/19/2010, 03/24/2011, 03/24/2011    Hepatitis A Vaccine, Ped/Adol 06/15/2005, 01/05/2006    Hepatitis B Vaccine Adolescent/Pediatric 01/13/1994, 03/09/1994, 05/10/1994    Hib Vaccine (Prp-d) - HISTORICAL DATA 01/13/1994, 03/09/1994, 05/10/1994, 02/01/1995    Influenza LAIV (Nasal) - HISTORICAL DATA 10/30/2009, 11/19/2010    Influenza Vaccine Quad Inj (Pf) 10/16/2015, 09/19/2017, 09/19/2018, 09/30/2019, 09/22/2020, 09/27/2021    Influenza Vaccine Quad Inj (Preserved) 10/17/2016    Influenza, Unspecified - HISTORICAL DATA 11/04/2022     MMR Vaccine 1995, 03/10/1998    MODERNA SARS-COV-2 VACCINE (12+) 2020, 2021    Meningococcal Conjugate Vaccine MCV4 (MENVEO) 2009    Meningococcal Conjugate Vaccine MCV4 (Menactra) 2009    OPV TRIVALENT - HISTORICAL DATA (GIVEN PRIOR TO MAY 2016) 1994, 1994, 05/10/1994, 03/10/1998    TD Vaccine 2006    Tdap Vaccine 2006, 2013    Tuberculin Skin Test 2014, 2014    Varicella Vaccine Live 03/10/1998, 2006       SOCIAL HISTORY:   Social History     Socioeconomic History    Marital status: Single    Highest education level: Associate degree: academic program   Tobacco Use    Smoking status: Former     Packs/day: 0.05     Years: 2.00     Pack years: 0.10     Types: Cigarettes     Quit date: 2011     Years since quittin.4    Smokeless tobacco: Never   Vaping Use    Vaping Use: Never used   Substance and Sexual Activity    Alcohol use: No    Drug use: No    Sexual activity: Yes     Partners: Male     Birth control/protection: Condom     Comment: condom     Social Determinants of Health     Financial Resource Strain: Low Risk  (2021)    Overall Financial Resource Strain (CARDIA)     Difficulty of Paying Living Expenses: Not very hard   Food Insecurity: No Food Insecurity (2021)    Hunger Vital Sign     Worried About Running Out of Food in the Last Year: Never true     Ran Out of Food in the Last Year: Never true   Transportation Needs: No Transportation Needs (2021)    PRAPARE - Transportation     Lack of Transportation (Medical): No     Lack of Transportation (Non-Medical): No   Physical Activity: Sufficiently Active (2021)    Exercise Vital Sign     Days of Exercise per Week: 5 days     Minutes of Exercise per Session: 30 min   Stress: Stress Concern Present (2021)    [place] [employer] - Occupational Stress Questionnaire     Feeling of Stress : To some extent   Social Connections:  "Socially Isolated (2/19/2021)    Social Connection and Isolation Panel [NHANES]     Frequency of Communication with Friends and Family: More than three times a week     Frequency of Social Gatherings with Friends and Family: More than three times a week     Attends Tenriism Services: Never     Active Member of Clubs or Organizations: No     Attends Club or Organization Meetings: Never     Marital Status: Never    Housing Stability: Low Risk  (2/19/2021)    Housing Stability Vital Sign     Unable to Pay for Housing in the Last Year: No     Number of Places Lived in the Last Year: 1     Unstable Housing in the Last Year: No       FAMILY HISTORY:  Family History   Problem Relation Age of Onset    Cancer Maternal Aunt         breast cancer     Hyperlipidemia Maternal Grandfather     Diabetes Maternal Grandfather         diet control    Hypertension Maternal Grandfather     Heart Disease Maternal Grandfather     Lupus Mother     Rheumatologic Disease Mother     Hypertension Father     Diabetes Maternal Uncle     Lung Disease Paternal Aunt         asthma            Objective     Vital Signs: /76 (BP Location: Left arm, Patient Position: Sitting, BP Cuff Size: Adult)   Pulse (!) 102   Temp 36.4 °C (97.6 °F) (Temporal)   Ht 1.499 m (4' 11\")   Wt 79.5 kg (175 lb 6 oz)   SpO2 97% Body mass index is 35.42 kg/m².    General: Appears well and comfortable  Eyes: No scleral or conjunctival lesions  ENT: No apparent oral or nasal lesions  Head/Neck: No apparent scalp or neck lesions  Cardiovascular: Regular rate and rhythm  Respiratory: Breathing quiet and unlabored  Gastrointestinal: No apparent organomegaly or abdominal masses  Integumentary: No significant cutaneous lesions or discolorations  Musculoskeletal: No significant joint tenderness, periarticular soft tissue swelling, warmth, erythema, or overt synovitis; no significant restriction in range of motion of joints examined  Neurologic: No focal sensory " or motor deficits  Psychiatric: Mood and affect appropriate      LABORATORY RESULTS REVIEWED AND INTERPRETED BY ME:  Lab Results   Component Value Date/Time    SEDRATEWES 11 10/17/2022 05:47 PM    CREACTPROT <0.30 10/17/2022 05:47 PM     Lab Results   Component Value Date/Time    Y4FTCADZUGV 109.5 07/21/2023 12:13 PM    J1JBZAGJLLF 15.9 (L) 07/21/2023 12:13 PM     Lab Results   Component Value Date/Time    RHEUMFACTN <10 03/26/2021 12:36 PM    RHEUMFACTN <10 03/26/2021 12:36 PM    CCPANTIBODY 5 03/26/2021 12:36 PM    XARL15SVID Negative 10/17/2022 05:47 PM     Lab Results   Component Value Date/Time    ANTINUCAB Detected (A) 07/21/2023 12:13 PM     Lab Results   Component Value Date/Time    ANTIDNADS 8 05/13/2022 01:32 PM    SMITHAB 2 07/21/2023 12:13 PM    RNPAB 4 07/21/2023 12:13 PM    WBZGOMW71 3 07/21/2023 12:13 PM    SSA60 2 07/21/2023 12:13 PM    SSA52 9 07/21/2023 12:13 PM    ANTISSBSJ 0 07/21/2023 12:13 PM    JO1AB 1 07/21/2023 12:13 PM     Lab Results   Component Value Date/Time    MICROSOMALA 9.0 05/13/2022 01:32 PM    TSHULTRASEN 1.600 05/13/2022 01:32 PM    FREET4 0.81 03/23/2019 11:22 AM     Lab Results   Component Value Date/Time    FERRITIN 21.7 02/17/2018 08:56 AM    IRON 75 02/17/2018 08:56 AM    FOLATE 16.0 08/01/2017 02:02 PM     Lab Results   Component Value Date/Time    WBC 7.1 05/13/2022 01:32 PM    RBC 4.71 05/13/2022 01:32 PM    HEMOGLOBIN 14.4 05/13/2022 01:32 PM    HEMATOCRIT 43.1 05/13/2022 01:32 PM    MCV 91.5 05/13/2022 01:32 PM    MCH 30.6 05/13/2022 01:32 PM    MCHC 33.4 (L) 05/13/2022 01:32 PM    RDW 43.6 05/13/2022 01:32 PM    PLATELETCT 242 05/13/2022 01:32 PM    MPV 9.8 05/13/2022 01:32 PM    NEUTS 3.11 03/23/2019 11:22 AM    LYMPHOCYTES 35.30 03/23/2019 11:22 AM    MONOCYTES 7.70 03/23/2019 11:22 AM    EOSINOPHILS 2.10 03/23/2019 11:22 AM    BASOPHILS 0.30 03/23/2019 11:22 AM     Lab Results   Component Value Date/Time    ASTSGOT 15 07/31/2020 01:19 PM    ALTSGPT 17 07/31/2020  01:19 PM    ALKPHOSPHAT 60 07/31/2020 01:19 PM    TBILIRUBIN 0.6 07/31/2020 01:19 PM    TOTPROTEIN 8.0 07/31/2020 01:19 PM    ALBUMIN 4.8 07/31/2020 01:19 PM     Lab Results   Component Value Date/Time    SODIUM 139 07/31/2020 01:19 PM    POTASSIUM 3.6 07/31/2020 01:19 PM    CHLORIDE 103 07/31/2020 01:19 PM    CO2 21 07/31/2020 01:19 PM    GLUCOSE 82 07/31/2020 01:21 PM    BUN 8 07/31/2020 01:19 PM    CREATININE 0.88 07/31/2020 01:19 PM    CALCIUM 9.5 07/31/2020 01:19 PM     Lab Results   Component Value Date/Time    MICROALBUR <1.2 05/13/2022 01:32 PM    MALBCRT see below 05/13/2022 01:32 PM     Lab Results   Component Value Date/Time    COLORURINE Yellow 05/13/2022 01:33 PM    SPECGRAVITY 1.006 05/13/2022 01:33 PM    PHURINE 6.5 05/13/2022 01:33 PM    GLUCOSEUR Negative 05/13/2022 01:33 PM    KETONES Negative 05/13/2022 01:33 PM    PROTEINURIN Negative 05/13/2022 01:33 PM     Lab Results   Component Value Date/Time    QNTTBGOLD Negative 03/15/2016 09:50 AM     Lab Results   Component Value Date/Time    HEPBSAG Negative 02/17/2018 08:54 AM    HEPCAB Non-Reactive 08/27/2021 01:09 PM     Lab Results   Component Value Date/Time    CHOLSTRLTOT 157 07/21/2023 12:11 PM    LDL 89 07/21/2023 12:11 PM    HDL 56 07/21/2023 12:11 PM    TRIGLYCERIDE 60 07/21/2023 12:11 PM       RADIOLOGY RESULTS REVIEWED AND INTERPRETED BY ME:  Results for orders placed during the hospital encounter of 02/10/21    DX-KNEE COMPLETE 4+ LEFT    Impression  Negative LEFT knee series.    Results for orders placed during the hospital encounter of 03/28/22    DX-FINGER(S) 2+ RIGHT    Impression  1.  There is no acute displaced fracture of the right 1st digit.    Results for orders placed during the hospital encounter of 07/14/14    DX-HAND 3+    Impression  1. Examination significantly limited by overlying bandaging. Patient is status post pinning of a distal fifth metacarpal fracture.  The fracture is again seen but not well delineated on the  current examination.    2. Osteopenia.    Results for orders placed during the hospital encounter of 04/15/22    US-RENAL    Impression  1.  Normal renal ultrasound.      All relevant laboratory and imaging results reported on this note were reviewed and interpreted by me.         Assessment & Plan     Isa Padgett is a 27 y.o. female with history as noted above whose presentation merits the following diagnostic and clinical status impressions and recommendations:    1. Undifferentiated connective tissue disease (HCC)  Clinical picture remains consistent with UCTD based on the pattern of her symptomatology and immunologic profile (positive NATIVIDAD homogenous pattern and low C4 complement). Majority of patients with UCTD remain in this state over the long-term without progression, but a minority of patients eventually evolve into a specific NATIVIDAD-associated disease entity. In this case, her declining NATIVIDAD titer, improving C4 complement, and overall clinical stability with low disease activity are reassuring and argue against progression, so no need for initiation of immunomodulatory therapy at this time.  - Consider hydroxychloroquine depending on her clinical trajectory    2. Raynaud's syndrome without gangrene  Presumed secondary Raynaud's syndrome in the setting of the underlying UCTD and appears to improve as the weather is warmer.  - Avoid exposure to cold temperature extremes and wear gloves/socks to keep hands/feet warm  - Consider trial of topical nitroglycerin or oral nifedipine depending on her clinical trajectory    3. Low serum complement C4  Presumably a component of her overactive immune system with overall trajectory towards improvement.  - Consider repeat testing depending on her clinical trajectory      The above assessment and plan were discussed with the patient who acknowledged understanding of the plan.    FOLLOW-UP: Return if symptoms worsen or fail to improve.         Thank you for the  opportunity to participate in the care of Isa Whitmore Andrae Padgett.    Mika Morrison M.D., M.S.  Rheumatologist, [facility] ? [facility]   of Clinical Medicine, Department of Internal Medicine  [facility] ? [facility]  Rheumatology website: [email or web address]

## 2023-07-26 NOTE — PATIENT INSTRUCTIONS
AFTER VISIT INSTRUCTIONS    Below are important information to help you navigate your healthcare needs and help us serve you safely and effectively:  If laboratory tests and/or imaging studies were ordered, remember to go get them done as instructed.  If new prescriptions and/or refills were sent, remember to go pick them up from your local pharmacy, or call the specialty pharmacy to request shipment.  Always take your prescription medications exactly as prescribed unless instructed otherwise.  Note that antirheumatic drugs and steroids are immunosuppressive which means they increase your risk of infections and have multiple potential adverse effects on various organ systems in your body, though most of them are uncommon.  It is important that you are up-to-date on age-appropriate immunizations, particularly shingles and bacterial/viral pneumonia vaccines, which you can request from me or your primary care provider.  Be sure to read the drug package inserts to learn about the potential side effects of your medications before you start taking them.  If you experience any significant drug side effects, stop taking the medication and notify me promptly, and depending on the severity of the side effects, consider going to an urgent care or emergency department for immediate attention.  If there are significant findings on your lab tests and imaging studies that warrant further action, I will notify you with explanations via Farahart or phone call, otherwise you can view them on Bioxodes and let me know if you have any questions.  Note that B[employer] messages are typically read during office hours and may take 1-7 business days before a response depending on the urgency of the situation and how busy my clinic schedule is.  In general, Bioxodes messaging is for non-urgent matters that do not require immediate attention, so for urgent matters that cannot wait, you are advised to go to an urgent care.  Lastly, you are granted  Jesset access to my documentation of your visit and are encouraged to read my note which details my assessment and plan for your condition.  To learn more about your condition and rheumatic diseases evaluated and treated by rheumatologists, as well as gain access to many helpful resources about these diseases, visit our website at [email or web address].

## 2023-07-28 PROCEDURE — RXMED WILLOW AMBULATORY MEDICATION CHARGE: Performed by: DERMATOLOGY

## 2023-07-28 RX ORDER — TACROLIMUS 1 MG/G
1 OINTMENT TOPICAL 2 TIMES DAILY
Qty: 30 G | Refills: 1 | OUTPATIENT
Start: 2023-07-28

## 2023-08-04 ENCOUNTER — PHARMACY VISIT (OUTPATIENT)
Dept: PHARMACY | Facility: MEDICAL CENTER | Age: 28
End: 2023-08-04
Payer: COMMERCIAL

## 2023-08-28 PROCEDURE — RXMED WILLOW AMBULATORY MEDICATION CHARGE: Performed by: PSYCHIATRY & NEUROLOGY

## 2023-09-01 ENCOUNTER — PHARMACY VISIT (OUTPATIENT)
Dept: PHARMACY | Facility: MEDICAL CENTER | Age: 28
End: 2023-09-01
Payer: COMMERCIAL

## 2023-10-05 PROCEDURE — RXMED WILLOW AMBULATORY MEDICATION CHARGE: Performed by: PSYCHIATRY & NEUROLOGY

## 2023-10-06 ENCOUNTER — PHARMACY VISIT (OUTPATIENT)
Dept: PHARMACY | Facility: MEDICAL CENTER | Age: 28
End: 2023-10-06
Payer: COMMERCIAL

## 2023-10-06 PROCEDURE — RXMED WILLOW AMBULATORY MEDICATION CHARGE: Performed by: DERMATOLOGY

## 2023-10-12 DIAGNOSIS — R21 RASH AND NONSPECIFIC SKIN ERUPTION: ICD-10-CM

## 2023-10-13 PROCEDURE — RXMED WILLOW AMBULATORY MEDICATION CHARGE: Performed by: DERMATOLOGY

## 2023-10-13 RX ORDER — TACROLIMUS 1 MG/G
1 OINTMENT TOPICAL 2 TIMES DAILY
Qty: 30 G | Refills: 1 | Status: SHIPPED | OUTPATIENT
Start: 2023-10-13

## 2023-10-13 RX ORDER — TRIAMCINOLONE ACETONIDE 1 MG/G
1 OINTMENT TOPICAL 2 TIMES DAILY
Qty: 30 G | Refills: 1 | Status: SHIPPED | OUTPATIENT
Start: 2023-10-13

## 2023-10-17 DIAGNOSIS — F33.2 SEVERE EPISODE OF RECURRENT MAJOR DEPRESSIVE DISORDER, WITHOUT PSYCHOTIC FEATURES (HCC): ICD-10-CM

## 2023-10-17 DIAGNOSIS — F41.9 ANXIETY: ICD-10-CM

## 2023-10-17 RX ORDER — BUPROPION HYDROCHLORIDE 300 MG/1
300 TABLET ORAL EVERY MORNING
Qty: 90 TABLET | Refills: 1 | Status: SHIPPED | OUTPATIENT
Start: 2023-10-17

## 2023-10-17 RX ORDER — SERTRALINE HYDROCHLORIDE 100 MG/1
200 TABLET, FILM COATED ORAL DAILY
Qty: 180 TABLET | Refills: 1 | Status: SHIPPED | OUTPATIENT
Start: 2023-10-17

## 2023-10-17 RX ORDER — BUPROPION HYDROCHLORIDE 150 MG/1
150 TABLET ORAL EVERY MORNING
Qty: 90 TABLET | Refills: 1 | Status: SHIPPED | OUTPATIENT
Start: 2023-10-17

## 2023-10-20 ENCOUNTER — PHARMACY VISIT (OUTPATIENT)
Dept: PHARMACY | Facility: MEDICAL CENTER | Age: 28
End: 2023-10-20
Payer: COMMERCIAL

## 2023-10-20 PROCEDURE — RXMED WILLOW AMBULATORY MEDICATION CHARGE: Performed by: DERMATOLOGY

## 2023-11-17 ENCOUNTER — PHARMACY VISIT (OUTPATIENT)
Dept: PHARMACY | Facility: MEDICAL CENTER | Age: 28
End: 2023-11-17
Payer: COMMERCIAL

## 2023-11-17 PROCEDURE — RXMED WILLOW AMBULATORY MEDICATION CHARGE: Performed by: PSYCHIATRY & NEUROLOGY

## 2023-12-22 ENCOUNTER — HOSPITAL ENCOUNTER (OUTPATIENT)
Dept: LAB | Facility: MEDICAL CENTER | Age: 28
End: 2023-12-22
Attending: PSYCHIATRY & NEUROLOGY
Payer: COMMERCIAL

## 2023-12-22 LAB
25(OH)D3 SERPL-MCNC: 17 NG/ML (ref 30–100)
ALBUMIN SERPL BCP-MCNC: 4.4 G/DL (ref 3.2–4.9)
ALBUMIN/GLOB SERPL: 1.4 G/DL
ALP SERPL-CCNC: 67 U/L (ref 30–99)
ALT SERPL-CCNC: 16 U/L (ref 2–50)
AMMONIA PLAS-SCNC: 18 UMOL/L (ref 11–45)
ANION GAP SERPL CALC-SCNC: 11 MMOL/L (ref 7–16)
AST SERPL-CCNC: 15 U/L (ref 12–45)
BASOPHILS # BLD AUTO: 0.3 % (ref 0–1.8)
BASOPHILS # BLD: 0.02 K/UL (ref 0–0.12)
BILIRUB SERPL-MCNC: 0.4 MG/DL (ref 0.1–1.5)
BUN SERPL-MCNC: 9 MG/DL (ref 8–22)
CALCIUM ALBUM COR SERPL-MCNC: 8.8 MG/DL (ref 8.5–10.5)
CALCIUM SERPL-MCNC: 9.1 MG/DL (ref 8.4–10.2)
CHLORIDE SERPL-SCNC: 104 MMOL/L (ref 96–112)
CO2 SERPL-SCNC: 23 MMOL/L (ref 20–33)
CREAT SERPL-MCNC: 0.9 MG/DL (ref 0.5–1.4)
EOSINOPHIL # BLD AUTO: 0.09 K/UL (ref 0–0.51)
EOSINOPHIL NFR BLD: 1.5 % (ref 0–6.9)
ERYTHROCYTE [DISTWIDTH] IN BLOOD BY AUTOMATED COUNT: 41.6 FL (ref 35.9–50)
ERYTHROCYTE [SEDIMENTATION RATE] IN BLOOD BY WESTERGREN METHOD: 11 MM/HOUR (ref 0–25)
GFR SERPLBLD CREATININE-BSD FMLA CKD-EPI: 88 ML/MIN/1.73 M 2
GLOBULIN SER CALC-MCNC: 3.2 G/DL (ref 1.9–3.5)
GLUCOSE SERPL-MCNC: 89 MG/DL (ref 65–99)
HCT VFR BLD AUTO: 41.9 % (ref 37–47)
HGB BLD-MCNC: 14.2 G/DL (ref 12–16)
IMM GRANULOCYTES # BLD AUTO: 0.02 K/UL (ref 0–0.11)
IMM GRANULOCYTES NFR BLD AUTO: 0.3 % (ref 0–0.9)
LYMPHOCYTES # BLD AUTO: 1.73 K/UL (ref 1–4.8)
LYMPHOCYTES NFR BLD: 28.4 % (ref 22–41)
MCH RBC QN AUTO: 31.1 PG (ref 27–33)
MCHC RBC AUTO-ENTMCNC: 33.9 G/DL (ref 32.2–35.5)
MCV RBC AUTO: 91.9 FL (ref 81.4–97.8)
MONOCYTES # BLD AUTO: 0.37 K/UL (ref 0–0.85)
MONOCYTES NFR BLD AUTO: 6.1 % (ref 0–13.4)
NEUTROPHILS # BLD AUTO: 3.87 K/UL (ref 1.82–7.42)
NEUTROPHILS NFR BLD: 63.4 % (ref 44–72)
NRBC # BLD AUTO: 0 K/UL
NRBC BLD-RTO: 0 /100 WBC (ref 0–0.2)
PLATELET # BLD AUTO: 250 K/UL (ref 164–446)
PMV BLD AUTO: 9.8 FL (ref 9–12.9)
POTASSIUM SERPL-SCNC: 3.7 MMOL/L (ref 3.6–5.5)
PROT SERPL-MCNC: 7.6 G/DL (ref 6–8.2)
RBC # BLD AUTO: 4.56 M/UL (ref 4.2–5.4)
SODIUM SERPL-SCNC: 138 MMOL/L (ref 135–145)
TSH SERPL DL<=0.005 MIU/L-ACNC: 1.32 UIU/ML (ref 0.38–5.33)
WBC # BLD AUTO: 6.1 K/UL (ref 4.8–10.8)

## 2023-12-22 PROCEDURE — 86376 MICROSOMAL ANTIBODY EACH: CPT

## 2023-12-22 PROCEDURE — 82140 ASSAY OF AMMONIA: CPT

## 2023-12-22 PROCEDURE — 84436 ASSAY OF TOTAL THYROXINE: CPT

## 2023-12-22 PROCEDURE — 83655 ASSAY OF LEAD: CPT

## 2023-12-22 PROCEDURE — 82525 ASSAY OF COPPER: CPT

## 2023-12-22 PROCEDURE — 84443 ASSAY THYROID STIM HORMONE: CPT

## 2023-12-22 PROCEDURE — 86780 TREPONEMA PALLIDUM: CPT

## 2023-12-22 PROCEDURE — 82300 ASSAY OF CADMIUM: CPT

## 2023-12-22 PROCEDURE — 82175 ASSAY OF ARSENIC: CPT

## 2023-12-22 PROCEDURE — 84480 ASSAY TRIIODOTHYRONINE (T3): CPT

## 2023-12-22 PROCEDURE — 85652 RBC SED RATE AUTOMATED: CPT

## 2023-12-22 PROCEDURE — 80053 COMPREHEN METABOLIC PANEL: CPT

## 2023-12-22 PROCEDURE — 84425 ASSAY OF VITAMIN B-1: CPT

## 2023-12-22 PROCEDURE — 82607 VITAMIN B-12: CPT

## 2023-12-22 PROCEDURE — 84207 ASSAY OF VITAMIN B-6: CPT

## 2023-12-22 PROCEDURE — 85025 COMPLETE CBC W/AUTO DIFF WBC: CPT

## 2023-12-22 PROCEDURE — 82746 ASSAY OF FOLIC ACID SERUM: CPT

## 2023-12-22 PROCEDURE — 36415 COLL VENOUS BLD VENIPUNCTURE: CPT

## 2023-12-22 PROCEDURE — 82390 ASSAY OF CERULOPLASMIN: CPT

## 2023-12-22 PROCEDURE — 82306 VITAMIN D 25 HYDROXY: CPT

## 2023-12-22 PROCEDURE — 86235 NUCLEAR ANTIGEN ANTIBODY: CPT

## 2023-12-22 PROCEDURE — 83825 ASSAY OF MERCURY: CPT

## 2023-12-23 LAB
ARSENIC BLD-MCNC: <10 UG/L
CADMIUM BLD-MCNC: <1 UG/L
CERULOPLASMIN SERPL-MCNC: 25 MG/DL (ref 16–45)
ENA SS-B IGG SER IA-ACNC: 0 AU/ML (ref 0–40)
FOLATE SERPL-MCNC: 5.6 NG/ML
LEAD BLDV-MCNC: <2 UG/DL
MERCURY BLD-MCNC: <2.5 UG/L
SSA52 R0ENA AB IGG Q0420: 7 AU/ML (ref 0–40)
SSA60 R0ENA AB IGG Q0419: 2 AU/ML (ref 0–40)
T PALLIDUM AB SER QL IA: NORMAL
T3 SERPL-MCNC: 117 NG/DL (ref 60–181)
T4 SERPL-MCNC: 6.4 UG/DL (ref 4–12)
THYROPEROXIDASE AB SERPL-ACNC: <9 IU/ML (ref 0–9)
VIT B12 SERPL-MCNC: 343 PG/ML (ref 211–911)

## 2023-12-24 LAB — COPPER SERPL-MCNC: 108 UG/DL (ref 80–155)

## 2023-12-25 LAB — VIT B6 SERPL-MCNC: 22.7 NMOL/L (ref 20–125)

## 2023-12-26 LAB — VIT B1 BLD-MCNC: 97 NMOL/L (ref 70–180)

## 2023-12-26 PROCEDURE — RXMED WILLOW AMBULATORY MEDICATION CHARGE: Performed by: PSYCHIATRY & NEUROLOGY

## 2024-01-04 ENCOUNTER — PHARMACY VISIT (OUTPATIENT)
Dept: PHARMACY | Facility: MEDICAL CENTER | Age: 29
End: 2024-01-04
Payer: COMMERCIAL

## 2024-01-04 ENCOUNTER — OFFICE VISIT (OUTPATIENT)
Dept: MEDICAL GROUP | Facility: MEDICAL CENTER | Age: 29
End: 2024-01-04
Payer: COMMERCIAL

## 2024-01-04 VITALS
RESPIRATION RATE: 16 BRPM | HEART RATE: 68 BPM | OXYGEN SATURATION: 98 % | HEIGHT: 59 IN | BODY MASS INDEX: 34.27 KG/M2 | TEMPERATURE: 97.5 F | SYSTOLIC BLOOD PRESSURE: 112 MMHG | WEIGHT: 170 LBS | DIASTOLIC BLOOD PRESSURE: 72 MMHG

## 2024-01-04 DIAGNOSIS — R00.0 TACHYCARDIA: ICD-10-CM

## 2024-01-04 DIAGNOSIS — Z23 NEED FOR VACCINATION: ICD-10-CM

## 2024-01-04 DIAGNOSIS — R25.1 TREMOR OF BOTH HANDS: ICD-10-CM

## 2024-01-04 DIAGNOSIS — R35.0 URINE FREQUENCY: ICD-10-CM

## 2024-01-04 DIAGNOSIS — E55.9 VITAMIN D DEFICIENCY: ICD-10-CM

## 2024-01-04 DIAGNOSIS — K21.00 GASTROESOPHAGEAL REFLUX DISEASE WITH ESOPHAGITIS WITHOUT HEMORRHAGE: ICD-10-CM

## 2024-01-04 DIAGNOSIS — G43.809 OTHER MIGRAINE WITHOUT STATUS MIGRAINOSUS, NOT INTRACTABLE: ICD-10-CM

## 2024-01-04 LAB
APPEARANCE UR: CLEAR
BILIRUB UR STRIP-MCNC: NORMAL MG/DL
COLOR UR AUTO: NORMAL
GLUCOSE UR STRIP.AUTO-MCNC: NORMAL MG/DL
KETONES UR STRIP.AUTO-MCNC: NORMAL MG/DL
LEUKOCYTE ESTERASE UR QL STRIP.AUTO: NORMAL
NITRITE UR QL STRIP.AUTO: NORMAL
PH UR STRIP.AUTO: 7 [PH] (ref 5–8)
PROT UR QL STRIP: NORMAL MG/DL
RBC UR QL AUTO: NORMAL
SP GR UR STRIP.AUTO: 1.02
UROBILINOGEN UR STRIP-MCNC: 0.2 MG/DL

## 2024-01-04 PROCEDURE — 81002 URINALYSIS NONAUTO W/O SCOPE: CPT | Performed by: NURSE PRACTITIONER

## 2024-01-04 PROCEDURE — 3074F SYST BP LT 130 MM HG: CPT | Performed by: NURSE PRACTITIONER

## 2024-01-04 PROCEDURE — 3078F DIAST BP <80 MM HG: CPT | Performed by: NURSE PRACTITIONER

## 2024-01-04 PROCEDURE — RXMED WILLOW AMBULATORY MEDICATION CHARGE: Performed by: NURSE PRACTITIONER

## 2024-01-04 PROCEDURE — 99214 OFFICE O/P EST MOD 30 MIN: CPT | Mod: 25 | Performed by: NURSE PRACTITIONER

## 2024-01-04 PROCEDURE — 90715 TDAP VACCINE 7 YRS/> IM: CPT | Performed by: NURSE PRACTITIONER

## 2024-01-04 PROCEDURE — 90471 IMMUNIZATION ADMIN: CPT | Performed by: NURSE PRACTITIONER

## 2024-01-04 PROCEDURE — RXMED WILLOW AMBULATORY MEDICATION CHARGE: Performed by: PSYCHIATRY & NEUROLOGY

## 2024-01-04 RX ORDER — OMEPRAZOLE 40 MG/1
40 CAPSULE, DELAYED RELEASE ORAL DAILY
Qty: 90 CAPSULE | Refills: 3 | Status: SHIPPED | OUTPATIENT
Start: 2024-01-04

## 2024-01-04 ASSESSMENT — PATIENT HEALTH QUESTIONNAIRE - PHQ9: CLINICAL INTERPRETATION OF PHQ2 SCORE: 0

## 2024-01-04 ASSESSMENT — FIBROSIS 4 INDEX: FIB4 SCORE: .45

## 2024-01-04 NOTE — LETTER
[facility]  IVELISSE LeighP.R.N.  [address]  [place] [address]  Fax: [phone]   Authorization for Release/Disclosure of   Protected Health Information   Name: CAITLIN BURNS : 1995 SSN: [id number]   Address: [address] [place] [address] [address]  [facility] [address] Phone:    [phone] (home)    I authorize the entity listed below to release/disclose the PHI below to:   [facility]/Tania iFgueroa A.P.R.N. and JUNIOR Leigh.P.R.CATRACHITA.   Provider or Entity Name:  [facility]    Address   City, State, Zip   Phone:      Fax:     Reason for request: continuity of care   Information to be released:    [  ] LAST COLONOSCOPY,  including any PATH REPORT and follow-up  [  ] LAST FIT/COLOGUARD RESULT [  ] LAST DEXA  [  ] LAST MAMMOGRAM  [  x] LAST PAP  [  ] LAST LABS [  ] RETINA EXAM REPORT  [  ] IMMUNIZATION RECORDS  [  ] Release all info      [  ] Check here and initial the line next to each item to release ALL health information INCLUDING  _____ Care and treatment for drug and / or alcohol abuse  _____ HIV testing, infection status, or AIDS  _____ Genetic Testing    DATES OF SERVICE OR TIME PERIOD TO BE DISCLOSED: _____________  I understand and acknowledge that:  * This Authorization may be revoked at any time by you in writing, except if your health information has already been used or disclosed.  * Your health information that will be used or disclosed as a result of you signing this authorization could be re-disclosed by the recipient. If this occurs, your re-disclosed health information may no longer be protected by State or Federal laws.  * You may refuse to sign this Authorization. Your refusal will not affect your ability to obtain treatment.  * This Authorization becomes effective upon signing and will  on (date) __________.      If no date is indicated, this Authorization will  one (1) year from the signature date.    Name: Caitlin Whitmore  Andrae Padgett  Signature: Date:   1/4/2024     PLEASE FAX REQUESTED RECORDS BACK TO: [phone]

## 2024-01-04 NOTE — LETTER
[facility]  IVELISSE LeighPMarcosRMarcosN.  [address]  [place] [address]  Fax: [phone]   Authorization for Release/Disclosure of   Protected Health Information   Name: CAITLIN BURNS : 1995 SSN: [id number]   Address: [address] [place] [address] [address]  [facility] [address] Phone:    [phone] (home)    I authorize the entity listed below to release/disclose the PHI below to:   [facility]/Tania Figueroa A.P.R.ELI and CHAR LeighRALINA   Provider or Entity Name:  [facility]    Address   City, State, [facility]   Phone:      Fax:     Reason for request: continuity of care   Information to be released:    [  ] LAST COLONOSCOPY,  including any PATH REPORT and follow-up  [  ] LAST FIT/COLOGUARD RESULT [  ] LAST DEXA  [  ] LAST MAMMOGRAM  [  ] LAST PAP  [  ] LAST LABS [  ] RETINA EXAM REPORT  [  ] IMMUNIZATION RECORDS  [x  ] Release all info      [  ] Check here and initial the line next to each item to release ALL health information INCLUDING  _____ Care and treatment for drug and / or alcohol abuse  _____ HIV testing, infection status, or AIDS  _____ Genetic Testing    DATES OF SERVICE OR TIME PERIOD TO BE DISCLOSED: _____________  I understand and acknowledge that:  * This Authorization may be revoked at any time by you in writing, except if your health information has already been used or disclosed.  * Your health information that will be used or disclosed as a result of you signing this authorization could be re-disclosed by the recipient. If this occurs, your re-disclosed health information may no longer be protected by State or Federal laws.  * You may refuse to sign this Authorization. Your refusal will not affect your ability to obtain treatment.  * This Authorization becomes effective upon signing and will  on (date) __________.      If no date is indicated, this Authorization will  one (1) year from the signature date.    Name: Caitlin  Myranda Padgett  Signature: Date:   1/4/2024     PLEASE FAX REQUESTED RECORDS BACK TO: [phone]

## 2024-01-04 NOTE — LETTER
[facility]  IVELISSE LeighP.R.N.  [address]  [place] [address]  Fax: [phone]   Authorization for Release/Disclosure of   Protected Health Information   Name: CAITLIN BURNS : 1995 SSN: [id number]   Address: [address] [place] [address] [address]  [place] [address] Phone:    [phone] (home)    I authorize the entity listed below to release/disclose the PHI below to:   [facility]/Tania Figueroa A.P.R.N. and IVELISSE LeighP.RSHON.   Provider or Entity Name:  [facility]   Address   City, State, Zip   Phone:      Fax:     Reason for request: continuity of care   Information to be released:    [  ] LAST COLONOSCOPY,  including any PATH REPORT and follow-up  [  ] LAST FIT/COLOGUARD RESULT [  ] LAST DEXA  [  ] LAST MAMMOGRAM  [  ] LAST PAP  [  ] LAST LABS [  ] RETINA EXAM REPORT  [  ] IMMUNIZATION RECORDS  [x  ] Release all info  (MRI of brain and cervical spine)      [  ] Check here and initial the line next to each item to release ALL health information INCLUDING  _____ Care and treatment for drug and / or alcohol abuse  _____ HIV testing, infection status, or AIDS  _____ Genetic Testing    DATES OF SERVICE OR TIME PERIOD TO BE DISCLOSED: _____________  I understand and acknowledge that:  * This Authorization may be revoked at any time by you in writing, except if your health information has already been used or disclosed.  * Your health information that will be used or disclosed as a result of you signing this authorization could be re-disclosed by the recipient. If this occurs, your re-disclosed health information may no longer be protected by State or Federal laws.  * You may refuse to sign this Authorization. Your refusal will not affect your ability to obtain treatment.  * This Authorization becomes effective upon signing and will  on (date) __________.      If no date is indicated, this Authorization will  one (1) year from the signature date.     Name: Isa Whitmore Andrae Padgett  Signature: Date:   1/4/2024     PLEASE FAX REQUESTED RECORDS BACK TO: [phone]

## 2024-01-04 NOTE — PROGRESS NOTES
"Subjective:     HPI:     Isa Padgett is a 28 y.o. female presents to discuss:   Chief Complaint   Patient presents with    Medication Refill     Presents for refill on Omeprazole and to update on other other medical issues.     GERD:  Started when she was pregnant-10 years ago.  Has been taking TUMS-frequently and then takes Omeprazole when TUMS are not helping. Tries to monitor her diet.     Tachycardia:  She also  updates me that she saw Cardiology at  last month for elevated HR. Hx of Zio Patch 6/2022. She notes having COVID her tachycardia was more prominent.   Pending ECHO and stress test-needs to get scheduled     Migraines:   Followed by [facility] for her Migraines  Now on Nurtec and Emgality-migraines controlled.  No longer on Imitrex .    Tremors:  -longstanding problem for patient.   Currently being worked up with Neurology.   -pending results of cervical MRI and MRI brain at [facility]-pending review of results.    Vitamin D low  Started on high dose Vitamin D from her Neurologist    Having frequent urination, \"chemical odor\"-about 4 days.   She is concerned she might have another UTI  Pending consult with her GYN in Feb-due for pap as well-requesting most recent pap results.     Will be establishing with new Behavioral Health provider.     ROS: : see above        Current Outpatient Medications:     omeprazole (PRILOSEC) 40 MG delayed-release capsule, Take 1 Capsule by mouth every day., Disp: 90 Capsule, Rfl: 3    vitamin D2, Ergocalciferol, (DRISDOL) 1.25 MG (45722 UT) Cap capsule, Take 1 capsule orally once weekly, Disp: 12 Capsule, Rfl: 1    buPROPion (WELLBUTRIN XL) 150 MG XL tablet, Take 1 Tablet by mouth every morning., Disp: 90 Tablet, Rfl: 1    buPROPion (WELLBUTRIN XL) 300 MG XL tablet, Take 1 Tablet by mouth every morning. Combine with 150 mg for a total daily dose of 450 mg., Disp: 90 Tablet, Rfl: 1    sertraline (ZOLOFT) 100 MG Tab, Take 2 Tablets by mouth every " "day., Disp: 180 Tablet, Rfl: 1    triamcinolone acetonide (KENALOG) 0.1 % Ointment, Apply 1 Application  topically 2 times a day. Body. Do not use on face, axilla,groin., Disp: 30 g, Rfl: 1    tacrolimus (PROTOPIC) 0.1 % Ointment, Apply 1 Application  topically 2 times a day. Use for eczema/rashes. May worsen acne., Disp: 30 g, Rfl: 1    tretinoin (RETIN-A) 0.025 % cream, Apply to affected area on face/shoulders at bedtime for acne., Disp: 45 g, Rfl: 3    clindamycin (CLEOCIN) 1 % Solution, Apply to affected area on face/shoulders once or twice daliy after benzoyl peroxide wash for acne., Disp: 60 mL, Rfl: 3    No Known Allergies    Objective:     Vitals: /72   Pulse 68   Temp 36.4 °C (97.5 °F)   Resp 16   Ht 1.499 m (4' 11\")   Wt 77.1 kg (170 lb)   SpO2 98%   BMI 34.34 kg/m²    General: Alert, pleasant, NAD  HEENT: Normocephalic.  Neck supple.   Respiratory: no distress, no audible wheezing, RR -WNL  Skin: Warm, dry, no rashes.  Extremities: No leg edema. No discoloration  Neurological: No tremors  Psych:  Affect/mood is normal, judgement is good, memory is intact, grooming is appropriate.    Point-of-care urinalysis negative for blood, negative for protein, negative for nitrates.  Positive for small trace of leuks.    Assessment/Plan:      1. Gastroesophageal reflux disease with esophagitis without hemorrhage  Chronic.  Not well-controlled.  Has been having symptoms for 10+ years.  Recommend consult with GI to evaluate need for EGD.  Recommend trial of omeprazole 40 mg daily.  Continue to modify diet.  Refill provided on omeprazole.  - omeprazole (PRILOSEC) 40 MG delayed-release capsule; Take 1 Capsule by mouth every day.  Dispense: 90 Capsule; Refill: 3  - Referral to Gastroenterology    2. Urine frequency  Acute.  Point-of-care urinalysis does not indicate impending infection.  Push fluids.  - POCT Urinalysis    3. Other migraine without status migrainosus, not intractable  Chronic.  Stable on " current regimen.  Followed by neurology.    4. Tremor of both hands  Chronic.  Uncertain etiology.  Continue follow-up with neurology.    5. Tachycardia  Intermittent problem.  Pending further evaluation with the [facility] cardiology.  Requesting records.    6. Vitamin D deficiency  Chronic.  High-dose vitamin D has been ordered from her neurologist which she tells me that she will start taking soon.      7. Need for vaccination  - Tdap Vaccine =>8YO IM      Return if symptoms worsen or fail to improve.    {I have placed the above orders and discussed them with an approved delegating provider. The MA is performing the below orders under the direction of Dr. Sarah PLATA

## 2024-01-12 ENCOUNTER — APPOINTMENT (OUTPATIENT)
Dept: DERMATOLOGY | Facility: IMAGING CENTER | Age: 29
End: 2024-01-12
Payer: COMMERCIAL

## 2024-01-12 ENCOUNTER — OFFICE VISIT (OUTPATIENT)
Dept: DERMATOLOGY | Facility: IMAGING CENTER | Age: 29
End: 2024-01-12
Payer: COMMERCIAL

## 2024-01-12 DIAGNOSIS — L70.0 ACNE VULGARIS: ICD-10-CM

## 2024-01-12 PROCEDURE — 99213 OFFICE O/P EST LOW 20 MIN: CPT | Performed by: DERMATOLOGY

## 2024-01-12 RX ORDER — CLINDAMYCIN PHOSPHATE 11.9 MG/ML
SOLUTION TOPICAL
Qty: 60 ML | Refills: 3 | Status: SHIPPED | OUTPATIENT
Start: 2024-01-12

## 2024-01-12 NOTE — PROGRESS NOTES
CC: acne    Subjective:Previously seen patient here for acne  No new breakouts   - occ flare on face - uses cover/patch to avoid picking  Happy with routine and skin clearance - uses moisturizer -cerave with SPF, BPO wash/clinda topical and bedtime Retin A.  Needs refills today      History of skin cancer: No  History of biopsies:No  History of blistering/severe sunburns:No  Family history of skin cancer:No  Family history of atypical moles:No    ROS: no fevers/chills. No itch.  No cough  Relevant PMH: NC  Social: former smoker. Uses sunprotection regularly and avoids sun    PE: Gen:WDWN female in NAD. Skin: focal exam. Scant acne papules on chin, face    A/P:   Acne, face: chronic stable  -cont past regimen:  -stop picking   -Retin A 0.025% cream QHS, se reviewed  -OTC Benzoyl peroxide + clindamycin solution Qday-BID  -f/u 1 year for refills/PRN      PIPA:   -sunprotection advised    I have reviewed medications relevant to my specialty.

## 2024-02-02 ENCOUNTER — PHARMACY VISIT (OUTPATIENT)
Dept: PHARMACY | Facility: MEDICAL CENTER | Age: 29
End: 2024-02-02
Payer: COMMERCIAL

## 2024-02-02 PROCEDURE — RXMED WILLOW AMBULATORY MEDICATION CHARGE: Performed by: PSYCHIATRY & NEUROLOGY

## 2024-02-02 PROCEDURE — RXMED WILLOW AMBULATORY MEDICATION CHARGE: Performed by: NURSE PRACTITIONER

## 2024-02-02 PROCEDURE — RXMED WILLOW AMBULATORY MEDICATION CHARGE: Performed by: DERMATOLOGY

## 2024-03-08 ENCOUNTER — PHARMACY VISIT (OUTPATIENT)
Dept: PHARMACY | Facility: MEDICAL CENTER | Age: 29
End: 2024-03-08
Payer: COMMERCIAL

## 2024-03-08 PROCEDURE — RXMED WILLOW AMBULATORY MEDICATION CHARGE: Performed by: PSYCHIATRY & NEUROLOGY

## 2024-03-08 PROCEDURE — RXMED WILLOW AMBULATORY MEDICATION CHARGE: Performed by: DERMATOLOGY

## 2024-03-08 PROCEDURE — RXMED WILLOW AMBULATORY MEDICATION CHARGE: Performed by: NURSE PRACTITIONER

## 2024-03-08 RX ORDER — BUPROPION HYDROCHLORIDE 300 MG/1
TABLET ORAL
Qty: 90 TABLET | Refills: 0 | OUTPATIENT
Start: 2024-03-08

## 2024-03-08 RX ORDER — BUPROPION HYDROCHLORIDE 150 MG/1
TABLET ORAL
Qty: 90 TABLET | Refills: 0 | OUTPATIENT
Start: 2024-03-08

## 2024-03-08 RX ORDER — SERTRALINE HYDROCHLORIDE 100 MG/1
TABLET, FILM COATED ORAL
Qty: 180 TABLET | Refills: 0 | OUTPATIENT
Start: 2024-03-08

## 2024-04-12 ENCOUNTER — HOSPITAL ENCOUNTER (OUTPATIENT)
Facility: MEDICAL CENTER | Age: 29
End: 2024-04-12
Payer: COMMERCIAL

## 2024-04-12 PROCEDURE — 87077 CULTURE AEROBIC IDENTIFY: CPT

## 2024-04-12 PROCEDURE — 87186 SC STD MICRODIL/AGAR DIL: CPT

## 2024-04-12 PROCEDURE — 87086 URINE CULTURE/COLONY COUNT: CPT

## 2024-04-18 PROCEDURE — RXMED WILLOW AMBULATORY MEDICATION CHARGE: Performed by: PSYCHIATRY & NEUROLOGY

## 2024-04-18 PROCEDURE — RXMED WILLOW AMBULATORY MEDICATION CHARGE: Performed by: DERMATOLOGY

## 2024-04-18 PROCEDURE — RXMED WILLOW AMBULATORY MEDICATION CHARGE: Performed by: NURSE PRACTITIONER

## 2024-04-19 ENCOUNTER — PHARMACY VISIT (OUTPATIENT)
Dept: PHARMACY | Facility: MEDICAL CENTER | Age: 29
End: 2024-04-19
Payer: COMMERCIAL

## 2024-04-19 PROCEDURE — RXMED WILLOW AMBULATORY MEDICATION CHARGE

## 2024-05-30 PROCEDURE — RXMED WILLOW AMBULATORY MEDICATION CHARGE: Performed by: NURSE PRACTITIONER

## 2024-06-04 ENCOUNTER — PHARMACY VISIT (OUTPATIENT)
Dept: PHARMACY | Facility: MEDICAL CENTER | Age: 29
End: 2024-06-04
Payer: COMMERCIAL

## 2024-06-04 PROCEDURE — RXMED WILLOW AMBULATORY MEDICATION CHARGE: Performed by: PSYCHIATRY & NEUROLOGY

## 2024-06-09 DIAGNOSIS — R21 RASH AND NONSPECIFIC SKIN ERUPTION: ICD-10-CM

## 2024-06-11 PROCEDURE — RXMED WILLOW AMBULATORY MEDICATION CHARGE: Performed by: PSYCHIATRY & NEUROLOGY

## 2024-06-11 PROCEDURE — RXMED WILLOW AMBULATORY MEDICATION CHARGE: Performed by: DERMATOLOGY

## 2024-06-11 NOTE — TELEPHONE ENCOUNTER
triamcinolone acetonide (KENALOG) 0.1 % Ointment     Received request via: Pharmacy    Was the patient seen in the last year in this department? Yes    Does the patient have an active prescription (recently filled or refills available) for medication(s) requested? No    Pharmacy Name: [facility]

## 2024-06-13 RX ORDER — TRIAMCINOLONE ACETONIDE 1 MG/G
1 OINTMENT TOPICAL 2 TIMES DAILY
Qty: 30 G | Refills: 1 | Status: SHIPPED | OUTPATIENT
Start: 2024-06-13

## 2024-06-14 ENCOUNTER — PHARMACY VISIT (OUTPATIENT)
Dept: PHARMACY | Facility: MEDICAL CENTER | Age: 29
End: 2024-06-14
Payer: COMMERCIAL

## 2024-06-29 PROCEDURE — RXMED WILLOW AMBULATORY MEDICATION CHARGE: Performed by: DERMATOLOGY

## 2024-06-29 PROCEDURE — RXMED WILLOW AMBULATORY MEDICATION CHARGE: Performed by: PSYCHIATRY & NEUROLOGY

## 2024-07-01 ENCOUNTER — PHARMACY VISIT (OUTPATIENT)
Dept: PHARMACY | Facility: MEDICAL CENTER | Age: 29
End: 2024-07-01
Payer: COMMERCIAL

## 2024-07-12 ENCOUNTER — HOSPITAL ENCOUNTER (OUTPATIENT)
Facility: MEDICAL CENTER | Age: 29
End: 2024-07-12
Payer: COMMERCIAL

## 2024-07-12 LAB
APPEARANCE UR: CLEAR
BACTERIA #/AREA URNS HPF: NEGATIVE /HPF
BILIRUB UR QL STRIP.AUTO: NEGATIVE
COLOR UR: YELLOW
EPI CELLS #/AREA URNS HPF: ABNORMAL /HPF
GLUCOSE UR STRIP.AUTO-MCNC: NEGATIVE MG/DL
HYALINE CASTS #/AREA URNS LPF: ABNORMAL /LPF
KETONES UR STRIP.AUTO-MCNC: NEGATIVE MG/DL
LEUKOCYTE ESTERASE UR QL STRIP.AUTO: ABNORMAL
MICRO URNS: ABNORMAL
NITRITE UR QL STRIP.AUTO: NEGATIVE
PH UR STRIP.AUTO: 6.5 [PH] (ref 5–8)
PROT UR QL STRIP: NEGATIVE MG/DL
RBC # URNS HPF: ABNORMAL /HPF
RBC UR QL AUTO: NEGATIVE
SP GR UR STRIP.AUTO: 1.02
UROBILINOGEN UR STRIP.AUTO-MCNC: 0.2 MG/DL
WBC #/AREA URNS HPF: ABNORMAL /HPF

## 2024-07-12 PROCEDURE — 81001 URINALYSIS AUTO W/SCOPE: CPT

## 2024-07-24 PROCEDURE — RXMED WILLOW AMBULATORY MEDICATION CHARGE: Performed by: DERMATOLOGY

## 2024-07-24 PROCEDURE — RXMED WILLOW AMBULATORY MEDICATION CHARGE: Performed by: PSYCHIATRY & NEUROLOGY

## 2024-07-24 PROCEDURE — RXMED WILLOW AMBULATORY MEDICATION CHARGE: Performed by: NURSE PRACTITIONER

## 2024-07-25 PROCEDURE — RXMED WILLOW AMBULATORY MEDICATION CHARGE: Performed by: STUDENT IN AN ORGANIZED HEALTH CARE EDUCATION/TRAINING PROGRAM

## 2024-08-02 PROCEDURE — RXMED WILLOW AMBULATORY MEDICATION CHARGE: Performed by: DERMATOLOGY

## 2024-08-07 ENCOUNTER — PHARMACY VISIT (OUTPATIENT)
Dept: PHARMACY | Facility: MEDICAL CENTER | Age: 29
End: 2024-08-07
Payer: COMMERCIAL

## 2024-08-16 ENCOUNTER — PHARMACY VISIT (OUTPATIENT)
Dept: PHARMACY | Facility: MEDICAL CENTER | Age: 29
End: 2024-08-16
Payer: COMMERCIAL

## 2024-08-16 PROCEDURE — RXMED WILLOW AMBULATORY MEDICATION CHARGE: Performed by: STUDENT IN AN ORGANIZED HEALTH CARE EDUCATION/TRAINING PROGRAM

## 2024-08-16 RX ORDER — ALPRAZOLAM 0.25 MG
TABLET ORAL
Qty: 28 TABLET | Refills: 0 | OUTPATIENT
Start: 2024-08-16

## 2024-08-29 PROCEDURE — RXMED WILLOW AMBULATORY MEDICATION CHARGE: Performed by: STUDENT IN AN ORGANIZED HEALTH CARE EDUCATION/TRAINING PROGRAM

## 2024-08-30 ENCOUNTER — HOSPITAL ENCOUNTER (OUTPATIENT)
Dept: LAB | Facility: MEDICAL CENTER | Age: 29
End: 2024-08-30
Attending: PSYCHIATRY & NEUROLOGY
Payer: COMMERCIAL

## 2024-08-30 LAB
25(OH)D3 SERPL-MCNC: 30 NG/ML (ref 30–100)
VIT B12 SERPL-MCNC: 1920 PG/ML (ref 211–911)

## 2024-08-30 PROCEDURE — 82306 VITAMIN D 25 HYDROXY: CPT

## 2024-08-30 PROCEDURE — 82607 VITAMIN B-12: CPT

## 2024-08-30 PROCEDURE — 36415 COLL VENOUS BLD VENIPUNCTURE: CPT

## 2024-09-03 PROCEDURE — RXMED WILLOW AMBULATORY MEDICATION CHARGE: Performed by: PSYCHIATRY & NEUROLOGY

## 2024-09-03 PROCEDURE — RXMED WILLOW AMBULATORY MEDICATION CHARGE: Performed by: NURSE PRACTITIONER

## 2024-09-10 ENCOUNTER — PHARMACY VISIT (OUTPATIENT)
Dept: PHARMACY | Facility: MEDICAL CENTER | Age: 29
End: 2024-09-10
Payer: COMMERCIAL

## 2024-10-03 PROCEDURE — RXMED WILLOW AMBULATORY MEDICATION CHARGE: Performed by: PSYCHIATRY & NEUROLOGY

## 2024-10-04 ENCOUNTER — PHARMACY VISIT (OUTPATIENT)
Dept: PHARMACY | Facility: MEDICAL CENTER | Age: 29
End: 2024-10-04
Payer: COMMERCIAL

## 2024-10-23 PROCEDURE — RXMED WILLOW AMBULATORY MEDICATION CHARGE: Performed by: NURSE PRACTITIONER

## 2024-10-23 PROCEDURE — RXMED WILLOW AMBULATORY MEDICATION CHARGE: Performed by: PSYCHIATRY & NEUROLOGY

## 2024-10-23 PROCEDURE — RXMED WILLOW AMBULATORY MEDICATION CHARGE: Performed by: DERMATOLOGY

## 2024-10-25 ENCOUNTER — PHARMACY VISIT (OUTPATIENT)
Dept: PHARMACY | Facility: MEDICAL CENTER | Age: 29
End: 2024-10-25
Payer: COMMERCIAL

## 2024-10-28 PROCEDURE — RXMED WILLOW AMBULATORY MEDICATION CHARGE: Performed by: STUDENT IN AN ORGANIZED HEALTH CARE EDUCATION/TRAINING PROGRAM

## 2024-11-22 ENCOUNTER — PHARMACY VISIT (OUTPATIENT)
Dept: PHARMACY | Facility: MEDICAL CENTER | Age: 29
End: 2024-11-22
Payer: COMMERCIAL

## 2024-11-22 PROCEDURE — RXMED WILLOW AMBULATORY MEDICATION CHARGE: Performed by: PSYCHIATRY & NEUROLOGY

## 2024-11-22 PROCEDURE — RXMED WILLOW AMBULATORY MEDICATION CHARGE: Performed by: NURSE PRACTITIONER

## 2024-12-16 PROCEDURE — RXMED WILLOW AMBULATORY MEDICATION CHARGE: Performed by: PSYCHIATRY & NEUROLOGY

## 2025-01-08 PROCEDURE — RXMED WILLOW AMBULATORY MEDICATION CHARGE: Performed by: STUDENT IN AN ORGANIZED HEALTH CARE EDUCATION/TRAINING PROGRAM

## 2025-01-10 ENCOUNTER — PHARMACY VISIT (OUTPATIENT)
Dept: PHARMACY | Facility: MEDICAL CENTER | Age: 30
End: 2025-01-10
Payer: COMMERCIAL

## 2025-01-10 DIAGNOSIS — K21.00 GASTROESOPHAGEAL REFLUX DISEASE WITH ESOPHAGITIS WITHOUT HEMORRHAGE: ICD-10-CM

## 2025-01-10 NOTE — TELEPHONE ENCOUNTER
Received request via: Pharmacy    Was the patient seen in the last year in this department? Yes    Does the patient have an active prescription (recently filled or refills available) for medication(s) requested? No    Pharmacy Name:   [facility] [place]  [address], [address]  [facility] [address]  Phone: [phone] Fax: [phone]    Does the patient have prison Plus and need 100-day supply? (This applies to ALL medications) Patient does not have SCP

## 2025-01-15 PROCEDURE — RXMED WILLOW AMBULATORY MEDICATION CHARGE: Performed by: PSYCHIATRY & NEUROLOGY

## 2025-01-15 PROCEDURE — RXMED WILLOW AMBULATORY MEDICATION CHARGE: Performed by: NURSE PRACTITIONER

## 2025-01-15 RX ORDER — OMEPRAZOLE 40 MG/1
40 CAPSULE, DELAYED RELEASE ORAL DAILY
Qty: 90 CAPSULE | Refills: 3 | Status: SHIPPED | OUTPATIENT
Start: 2025-01-15

## 2025-01-17 ENCOUNTER — OFFICE VISIT (OUTPATIENT)
Dept: DERMATOLOGY | Facility: IMAGING CENTER | Age: 30
End: 2025-01-17
Payer: COMMERCIAL

## 2025-01-17 ENCOUNTER — PHARMACY VISIT (OUTPATIENT)
Dept: PHARMACY | Facility: MEDICAL CENTER | Age: 30
End: 2025-01-17
Payer: COMMERCIAL

## 2025-01-17 DIAGNOSIS — L81.9 HYPERPIGMENTATION: ICD-10-CM

## 2025-01-17 DIAGNOSIS — L70.0 ACNE VULGARIS: ICD-10-CM

## 2025-01-17 PROCEDURE — 99213 OFFICE O/P EST LOW 20 MIN: CPT | Performed by: DERMATOLOGY

## 2025-01-17 RX ORDER — DAPSONE 50 MG/G
GEL TOPICAL
Qty: 60 G | Refills: 1 | Status: SHIPPED | OUTPATIENT
Start: 2025-01-17

## 2025-01-17 RX ORDER — GALCANEZUMAB 120 MG/ML
INJECTION, SOLUTION SUBCUTANEOUS
COMMUNITY
Start: 2023-12-19

## 2025-01-17 RX ORDER — TRETINOIN 0.5 MG/G
CREAM TOPICAL
Qty: 45 G | Refills: 2 | Status: SHIPPED | OUTPATIENT
Start: 2025-01-17

## 2025-01-17 NOTE — PROGRESS NOTES
CC: acne    Subjective:Previously seen patient here for acne  New breakouts over forehead. Noting dryness on facial cheeks.  Using cerave moisturizer.  Still on Reti-A and benzoyl. Not working anymore.  Pt has noticed her skin has become more oily.  Does not desire to use orals     History of skin cancer: No  History of biopsies:No  History of blistering/severe sunburns:No  Family history of skin cancer:No  Family history of atypical moles:No    ROS: no fevers/chills. No itch.  No cough  Relevant PMH: NC. Not pregnant, Not trying to get pregnant but not working to avoid pregnancy.  Social: former smoker. Uses sunprotection regularly and avoids sun    PE: Gen:WDWN female in NAD. Skin: focal exam. Scant acne papules on chin, face. Hyperpigmented macules / pink macules on forehead predominantly.     Labs: 12/23: K/BUN/Cr: WNL    A/P:   Acne, face: chronic stable  -reviewed dx/tx  -wash - gentle  -Retin A 0.05% cream QHS, se reviewed  -dapsone topical - aczone gel Qday-BID, se reviewed  -moisturizer PRN    PIPA:   -sunprotection advised    I have reviewed medications relevant to my specialty.

## 2025-01-20 PROCEDURE — RXMED WILLOW AMBULATORY MEDICATION CHARGE: Performed by: DERMATOLOGY

## 2025-01-24 ENCOUNTER — PHARMACY VISIT (OUTPATIENT)
Dept: PHARMACY | Facility: MEDICAL CENTER | Age: 30
End: 2025-01-24
Payer: COMMERCIAL

## 2025-01-27 NOTE — TELEPHONE ENCOUNTER
Received request via: Pharmacy    Was the patient seen in the last year in this department? Yes    Does the patient have an active prescription (recently filled or refills available) for medication(s) requested? No    Pharmacy Name: [facility]     Does the patient have care home Plus and need 100-day supply? (This applies to ALL medications) Patient does not have SCP

## 2025-01-29 RX ORDER — ALPRAZOLAM 0.25 MG/1
TABLET ORAL
Qty: 28 TABLET | Refills: 0 | OUTPATIENT
Start: 2025-01-29

## 2025-01-31 PROCEDURE — RXMED WILLOW AMBULATORY MEDICATION CHARGE: Performed by: STUDENT IN AN ORGANIZED HEALTH CARE EDUCATION/TRAINING PROGRAM

## 2025-02-03 PROCEDURE — RXMED WILLOW AMBULATORY MEDICATION CHARGE: Performed by: DERMATOLOGY

## 2025-02-07 ENCOUNTER — PHARMACY VISIT (OUTPATIENT)
Dept: PHARMACY | Facility: MEDICAL CENTER | Age: 30
End: 2025-02-07
Payer: COMMERCIAL

## 2025-02-07 ENCOUNTER — APPOINTMENT (OUTPATIENT)
Dept: MEDICAL GROUP | Facility: MEDICAL CENTER | Age: 30
End: 2025-02-07
Payer: COMMERCIAL

## 2025-02-19 SDOH — ECONOMIC STABILITY: TRANSPORTATION INSECURITY
IN THE PAST 12 MONTHS, HAS LACK OF TRANSPORTATION KEPT YOU FROM MEETINGS, WORK, OR FROM GETTING THINGS NEEDED FOR DAILY LIVING?: NO

## 2025-02-19 SDOH — ECONOMIC STABILITY: INCOME INSECURITY: IN THE LAST 12 MONTHS, WAS THERE A TIME WHEN YOU WERE NOT ABLE TO PAY THE MORTGAGE OR RENT ON TIME?: NO

## 2025-02-19 SDOH — HEALTH STABILITY: PHYSICAL HEALTH: ON AVERAGE, HOW MANY DAYS PER WEEK DO YOU ENGAGE IN MODERATE TO STRENUOUS EXERCISE (LIKE A BRISK WALK)?: 3 DAYS

## 2025-02-19 SDOH — ECONOMIC STABILITY: INCOME INSECURITY: HOW HARD IS IT FOR YOU TO PAY FOR THE VERY BASICS LIKE FOOD, HOUSING, MEDICAL CARE, AND HEATING?: NOT VERY HARD

## 2025-02-19 SDOH — HEALTH STABILITY: PHYSICAL HEALTH: ON AVERAGE, HOW MANY MINUTES DO YOU ENGAGE IN EXERCISE AT THIS LEVEL?: 10 MIN

## 2025-02-19 SDOH — ECONOMIC STABILITY: FOOD INSECURITY: WITHIN THE PAST 12 MONTHS, YOU WORRIED THAT YOUR FOOD WOULD RUN OUT BEFORE YOU GOT MONEY TO BUY MORE.: NEVER TRUE

## 2025-02-19 SDOH — ECONOMIC STABILITY: FOOD INSECURITY: WITHIN THE PAST 12 MONTHS, THE FOOD YOU BOUGHT JUST DIDN'T LAST AND YOU DIDN'T HAVE MONEY TO GET MORE.: NEVER TRUE

## 2025-02-19 ASSESSMENT — SOCIAL DETERMINANTS OF HEALTH (SDOH)
HOW OFTEN DO YOU ATTEND CHURCH OR RELIGIOUS SERVICES?: NEVER
HOW MANY DRINKS CONTAINING ALCOHOL DO YOU HAVE ON A TYPICAL DAY WHEN YOU ARE DRINKING: PATIENT DOES NOT DRINK
WITHIN THE PAST 12 MONTHS, YOU WORRIED THAT YOUR FOOD WOULD RUN OUT BEFORE YOU GOT THE MONEY TO BUY MORE: NEVER TRUE
HOW OFTEN DO YOU ATTENT MEETINGS OF THE CLUB OR ORGANIZATION YOU BELONG TO?: NEVER
HOW OFTEN DO YOU HAVE SIX OR MORE DRINKS ON ONE OCCASION: NEVER
DO YOU BELONG TO ANY CLUBS OR ORGANIZATIONS SUCH AS CHURCH GROUPS UNIONS, FRATERNAL OR ATHLETIC GROUPS, OR SCHOOL GROUPS?: NO
ARE YOU MARRIED, WIDOWED, DIVORCED, SEPARATED, NEVER MARRIED, OR LIVING WITH A PARTNER?: NEVER MARRIED
HOW OFTEN DO YOU ATTEND CHURCH OR RELIGIOUS SERVICES?: NEVER
IN A TYPICAL WEEK, HOW MANY TIMES DO YOU TALK ON THE PHONE WITH FAMILY, FRIENDS, OR NEIGHBORS?: MORE THAN THREE TIMES A WEEK
HOW OFTEN DO YOU HAVE A DRINK CONTAINING ALCOHOL: NEVER
IN THE PAST 12 MONTHS, HAS THE ELECTRIC, GAS, OIL, OR WATER COMPANY THREATENED TO SHUT OFF SERVICE IN YOUR HOME?: NO
HOW OFTEN DO YOU GET TOGETHER WITH FRIENDS OR RELATIVES?: MORE THAN THREE TIMES A WEEK
HOW OFTEN DO YOU ATTENT MEETINGS OF THE CLUB OR ORGANIZATION YOU BELONG TO?: NEVER
HOW OFTEN DO YOU GET TOGETHER WITH FRIENDS OR RELATIVES?: MORE THAN THREE TIMES A WEEK
DO YOU BELONG TO ANY CLUBS OR ORGANIZATIONS SUCH AS CHURCH GROUPS UNIONS, FRATERNAL OR ATHLETIC GROUPS, OR SCHOOL GROUPS?: NO
ARE YOU MARRIED, WIDOWED, DIVORCED, SEPARATED, NEVER MARRIED, OR LIVING WITH A PARTNER?: NEVER MARRIED
HOW HARD IS IT FOR YOU TO PAY FOR THE VERY BASICS LIKE FOOD, HOUSING, MEDICAL CARE, AND HEATING?: NOT VERY HARD
IN A TYPICAL WEEK, HOW MANY TIMES DO YOU TALK ON THE PHONE WITH FAMILY, FRIENDS, OR NEIGHBORS?: MORE THAN THREE TIMES A WEEK

## 2025-02-19 ASSESSMENT — LIFESTYLE VARIABLES
AUDIT-C TOTAL SCORE: 0
HOW MANY STANDARD DRINKS CONTAINING ALCOHOL DO YOU HAVE ON A TYPICAL DAY: PATIENT DOES NOT DRINK
SKIP TO QUESTIONS 9-10: 1
HOW OFTEN DO YOU HAVE SIX OR MORE DRINKS ON ONE OCCASION: NEVER
HOW OFTEN DO YOU HAVE A DRINK CONTAINING ALCOHOL: NEVER

## 2025-02-21 ENCOUNTER — OFFICE VISIT (OUTPATIENT)
Dept: MEDICAL GROUP | Facility: MEDICAL CENTER | Age: 30
End: 2025-02-21
Payer: COMMERCIAL

## 2025-02-21 ENCOUNTER — PHARMACY VISIT (OUTPATIENT)
Dept: PHARMACY | Facility: MEDICAL CENTER | Age: 30
End: 2025-02-21
Payer: COMMERCIAL

## 2025-02-21 VITALS
TEMPERATURE: 98 F | WEIGHT: 167.55 LBS | DIASTOLIC BLOOD PRESSURE: 58 MMHG | HEART RATE: 94 BPM | RESPIRATION RATE: 16 BRPM | BODY MASS INDEX: 33.78 KG/M2 | HEIGHT: 59 IN | OXYGEN SATURATION: 98 % | SYSTOLIC BLOOD PRESSURE: 116 MMHG

## 2025-02-21 DIAGNOSIS — F33.2 SEVERE EPISODE OF RECURRENT MAJOR DEPRESSIVE DISORDER, WITHOUT PSYCHOTIC FEATURES (HCC): Chronic | ICD-10-CM

## 2025-02-21 DIAGNOSIS — Z00.00 ROUTINE GENERAL MEDICAL EXAMINATION AT A HEALTH CARE FACILITY: ICD-10-CM

## 2025-02-21 DIAGNOSIS — J35.1 ENLARGED TONSILS: ICD-10-CM

## 2025-02-21 DIAGNOSIS — F41.1 GAD (GENERALIZED ANXIETY DISORDER): Chronic | ICD-10-CM

## 2025-02-21 DIAGNOSIS — G47.33 OSA (OBSTRUCTIVE SLEEP APNEA): ICD-10-CM

## 2025-02-21 DIAGNOSIS — K21.00 GASTROESOPHAGEAL REFLUX DISEASE WITH ESOPHAGITIS WITHOUT HEMORRHAGE: ICD-10-CM

## 2025-02-21 DIAGNOSIS — G43.809 OTHER MIGRAINE WITHOUT STATUS MIGRAINOSUS, NOT INTRACTABLE: ICD-10-CM

## 2025-02-21 DIAGNOSIS — M35.9 UNDIFFERENTIATED CONNECTIVE TISSUE DISEASE (HCC): ICD-10-CM

## 2025-02-21 PROBLEM — E66.811 OBESITY (BMI 30.0-34.9): Chronic | Status: ACTIVE | Noted: 2017-08-01

## 2025-02-21 PROBLEM — E55.9 VITAMIN D DEFICIENCY: Chronic | Status: ACTIVE | Noted: 2024-01-04

## 2025-02-21 PROCEDURE — RXMED WILLOW AMBULATORY MEDICATION CHARGE: Performed by: NURSE PRACTITIONER

## 2025-02-21 PROCEDURE — 3074F SYST BP LT 130 MM HG: CPT | Performed by: NURSE PRACTITIONER

## 2025-02-21 PROCEDURE — 99395 PREV VISIT EST AGE 18-39: CPT | Performed by: NURSE PRACTITIONER

## 2025-02-21 PROCEDURE — RXMED WILLOW AMBULATORY MEDICATION CHARGE: Performed by: PSYCHIATRY & NEUROLOGY

## 2025-02-21 PROCEDURE — 3078F DIAST BP <80 MM HG: CPT | Performed by: NURSE PRACTITIONER

## 2025-02-21 ASSESSMENT — PATIENT HEALTH QUESTIONNAIRE - PHQ9
7. TROUBLE CONCENTRATING ON THINGS, SUCH AS READING THE NEWSPAPER OR WATCHING TELEVISION: NOT AT ALL
2. FEELING DOWN, DEPRESSED, IRRITABLE, OR HOPELESS: NOT AT ALL
9. THOUGHTS THAT YOU WOULD BE BETTER OFF DEAD, OR OF HURTING YOURSELF: NOT AT ALL
2. FEELING DOWN, DEPRESSED, IRRITABLE, OR HOPELESS: NOT AT ALL
1. LITTLE INTEREST OR PLEASURE IN DOING THINGS: NOT AT ALL
5. POOR APPETITE OR OVEREATING: NOT AT ALL
SUM OF ALL RESPONSES TO PHQ9 QUESTIONS 1 AND 2: 0
4. FEELING TIRED OR HAVING LITTLE ENERGY: NOT AT ALL
SUM OF ALL RESPONSES TO PHQ QUESTIONS 1-9: 0
SUM OF ALL RESPONSES TO PHQ9 QUESTIONS 1 AND 2: 0
8. MOVING OR SPEAKING SO SLOWLY THAT OTHER PEOPLE COULD HAVE NOTICED. OR THE OPPOSITE, BEING SO FIGETY OR RESTLESS THAT YOU HAVE BEEN MOVING AROUND A LOT MORE THAN USUAL: NOT AT ALL
1. LITTLE INTEREST OR PLEASURE IN DOING THINGS: NOT AT ALL
3. TROUBLE FALLING OR STAYING ASLEEP OR SLEEPING TOO MUCH: NOT AT ALL
6. FEELING BAD ABOUT YOURSELF - OR THAT YOU ARE A FAILURE OR HAVE LET YOURSELF OR YOUR FAMILY DOWN: NOT AL ALL

## 2025-02-21 ASSESSMENT — FIBROSIS 4 INDEX: FIB4 SCORE: .465

## 2025-02-21 NOTE — LETTER
[facility]  ELISSA Leigh.  [address] [facility] [address]  [place] [address]  Fax: [phone]   Authorization for Release/Disclosure of   Protected Health Information   Name: CAITLIN BURNS : 1995 SSN: [id number]   Address: [address] [place] [address]  [place] [address] Phone:    [phone] (home) [phone] (work)   I authorize the entity listed below to release/disclose the PHI below to:   [facility]/BONI Leigh and BONI Leigh   Provider or Entity Name:  JULISSA Aguillon   Address   City, State, Zip   Phone:      Fax:     Reason for request: continuity of care   Information to be released:    [  ] LAST COLONOSCOPY,  including any PATH REPORT and follow-up   [  ] LAST DEXA  [  ] LAST MAMMOGRAM  [  x] LAST PAP  [  ] LAST LABS [  ] RETINA EXAM REPORT  [  ] IMMUNIZATION RECORDS  [  ] Release all info        _____ Care and treatment for drug and / or alcohol abuse  _____ HIV testing, infection status, or AIDS  _____ Genetic Testing    DATES OF SERVICE OR TIME PERIOD TO BE DISCLOSED: _____________  I understand and acknowledge that:  * This Authorization may be revoked at any time by you in writing, except if your health information has already been used or disclosed.  * Your health information that will be used or disclosed as a result of you signing this authorization could be re-disclosed by the recipient. If this occurs, your re-disclosed health information may no longer be protected by State or Federal laws.  * You may refuse to sign this Authorization. Your refusal will not affect your ability to obtain treatment.  * This Authorization becomes effective upon signing and will  on (date) __________.      If no date is indicated, this Authorization will  one (1) year from the signature date.    Name: Caitlin Burns  Signature: Date:   2025     PLEASE FAX REQUESTED RECORDS BACK TO: [phone]  
None

## 2025-02-21 NOTE — PROGRESS NOTES
Subjective:     Isa Padgett is a 29 y.o. female presents to discuss:     Chief Complaint   Patient presents with    Annual Exam     Verbal consent was acquired by the patient to use [employer] ambient listening note generation during this visit Yes   History of Present Illness  The patient presents for an annual exam.    She reports no new or unusual symptoms, maintaining a stable health status. She has recently changed her employment and is scheduled to start her new job next week, which will involve a switch to [employer] insurance. She has declined the influenza vaccine.   She is up-to-date with her eye exams and dental cleanings.   She is in a safe relationship. She has had a Pap smear done by her OB-GYN, which was normal. She attempts to walk daily after work but finds it challenging due to cold weather and weight issues. She also engages in stair climbing at home. She abstains from alcohol consumption. She had a consultation with a rheumatologist 2 years ago, during which all lab results were normal. She has an upcoming appointment with the rheumatologist to maintain her established care, although she is uncertain about the feasibility of this due to her impending insurance change. She does not use a CPAP machine. She was advised to use a dental guard but discontinued it due to jaw discomfort. She has considered tonsillectomy but has not pursued it due to her age.    She continues her regimen of Wellbutrin 300 mg, Xanax as needed, Zoloft 100 mg, dapsone, Retin-A, triamcinolone, clindamycin, vitamin D, Emgality, and tacrolimus. She has not yet received her dapsone prescription.     She uses Nurtec as needed for migraines, which have been severe even post-COVID-19 infection, causing daily headaches. She has been under the care of a neurologist who prescribed Emgality injections, which have effectively managed her migraines. She finds Nurtec 75 mg effective for mild migraines.    Her GERD is  well-managed with omeprazole.    SOCIAL HISTORY  She does not smoke. She does not consume alcohol.    FAMILY HISTORY  No new family history updates.    IMMUNIZATIONS  She is up to date on her tetanus vaccine.    ROS: : see above      Current Outpatient Medications:     Rimegepant Sulfate (NURTEC PO), Take 75 mg by mouth 1 time a day as needed (Migraines)., Disp: , Rfl:     sertraline (ZOLOFT) 100 MG Tab, Take 1 Tablet by mouth every day for mood and anxiety, Disp: 30 Tablet, Rfl: 1    buPROPion (WELLBUTRIN XL) 300 MG XL tablet, Take 1 tablet by mouth every morning for mood., Disp: 30 Tablet, Rfl: 1    ALPRAZolam (XANAX) 0.25 MG Tab, Take 1 tablet by mouth once a day as needed for extreme anxiety. F41.1, Disp: 15 Tablet, Rfl: 1    Galcanezumab-gnlm (EMGALITY) 120 MG/ML Solution Prefilled Syringe, , Disp: , Rfl:     tretinoin (RETIN-A) 0.05 % cream, Apply to affected area on face/shoulders at bedtime for acne., Disp: 45 g, Rfl: 2    Dapsone 5 % Gel, Apply to affected area once to twice a day for acne, Disp: 60 g, Rfl: 1    omeprazole (PRILOSEC) 40 MG delayed-release capsule, Take 1 Capsule by mouth every day., Disp: 90 Capsule, Rfl: 3    triamcinolone acetonide (KENALOG) 0.1 % Ointment, Apply 1 Application  topically 2 times a day. Body. Do not use on face, axilla,groin., Disp: 30 g, Rfl: 1    clindamycin (CLEOCIN) 1 % Solution, Apply to affected area on face/shoulders once or twice daily after benzoyl peroxide wash for acne., Disp: 60 mL, Rfl: 3    vitamin D2, Ergocalciferol, (DRISDOL) 1.25 MG (77350 UT) Cap capsule, Take 1 capsule orally once weekly, Disp: 12 Capsule, Rfl: 1    tacrolimus (PROTOPIC) 0.1 % Ointment, Apply 1 Application  topically 2 times a day. Use for eczema/rashes. May worsen acne., Disp: 30 g, Rfl: 1    sertraline (ZOLOFT) 100 MG Tab, Take 1 tablet by mouth once a day for mood and anxiety (Patient not taking: Reported on 1/17/2025), Disp: 30 Tablet, Rfl: 1    buPROPion (WELLBUTRIN XL) 300 MG XL  "tablet, Take 1 tablet by mouth every morning for mood. (Patient not taking: Reported on 1/17/2025), Disp: 30 Tablet, Rfl: 1    buPROPion (WELLBUTRIN XL) 300 MG XL tablet, Take 1 tablet by mouth every morning for mood. (Patient not taking: Reported on 1/17/2025), Disp: 30 Tablet, Rfl: 1    sertraline (ZOLOFT) 100 MG Tab, Take 1 Tablet by mouth every day for mood and anxiety (Patient not taking: Reported on 1/17/2025), Disp: 30 Tablet, Rfl: 1    vitamin D2, Ergocalciferol, (DRISDOL) 1.25 MG (72830 UT) Cap capsule, Take 1 capsule by mouth once a week, Disp: 12 Capsule, Rfl: 1    sertraline (ZOLOFT) 100 MG Tab, Take 1 tablet by mouth once a day for mood and anxiety (Patient not taking: Reported on 1/17/2025), Disp: 30 Tablet, Rfl: 1    sertraline (ZOLOFT) 100 MG Tab, Take 1 Tablet by mouth every day for mood and anxiety. (Patient not taking: Reported on 1/17/2025), Disp: 30 Tablet, Rfl: 1    buPROPion (WELLBUTRIN XL) 300 MG XL tablet, Take 1 tablet by mouth every morning for mood. (Patient not taking: Reported on 1/17/2025), Disp: 30 Tablet, Rfl: 1    sertraline (ZOLOFT) 100 MG Tab, Take 1 Tablet by mouth every day. (Patient not taking: Reported on 1/17/2025), Disp: 30 Tablet, Rfl: 1    buPROPion (WELLBUTRIN XL) 150 MG XL tablet, Take 2 tablet by mouth every morning for mood. (Patient not taking: Reported on 1/17/2025), Disp: 60 Tablet, Rfl: 1    buPROPion (WELLBUTRIN XL) 150 MG XL tablet, Take 1 oral tablet every morning take along with 300mg tab for a total of 450mg (Patient not taking: Reported on 1/17/2025), Disp: 30 Tablet, Rfl: 2    buPROPion (WELLBUTRIN XL) 300 MG XL tablet, Take 1 Tablet by mouth every morning. Combine with 150 mg for a total daily dose of 450 mg., Disp: 90 Tablet, Rfl: 1    No Known Allergies    Objective:   Vitals: /58   Pulse 94   Temp 36.7 °C (98 °F) (Temporal)   Resp 16   Ht 1.499 m (4' 11\")   Wt 76 kg (167 lb 8.8 oz)   LMP 02/18/2025   SpO2 98%   BMI 33.84 kg/m²    Physical " Exam:  Constitutional: Well-developed and well-nourished. Not diaphoretic. No distress.   Skin: Skin is warm and dry. No rash noted.  Head: Atraumatic without lesions.  Eyes: Conjunctivae and extraocular motions are normal. Pupils are equal, round, and reactive to light. No scleral icterus.   Ears:  External ears unremarkable. Tympanic membranes clear and intact.  Nose: Nares patent. Septum midline. Turbinates without erythema nor edema. No discharge.   Mouth/Throat: Tongue normal. Oropharynx is clear and moist.  Hypertrophied tonsils, narrow airway  Neck: Supple, trachea midline. Normal range of motion. No thyromegaly present. No lymphadenopathy--cervical or supraclavicular.  Cardiovascular: Regular rate and rhythm, S1 and S2 without murmur, rubs, or gallops.    Respiratory: Effort normal. Clear to auscultation throughout. No adventitious sounds.   Abdomen: Soft, non tender, and without distention.  Extremities: No cyanosis, clubbing, erythema, nor edema. Distal pulses intact and symmetric.   Musculoskeletal: All major joints AROM full in all directions without pain.  Neurological: Alert and oriented x 3. Grossly non-focal.   Psychiatric:  Behavior, mood, and affect are appropriate.   Assessment/Plan:      1. Routine general medical examination at a health care facility    2. Gastroesophageal reflux disease with esophagitis without hemorrhage    3. Severe episode of recurrent major depressive disorder, without psychotic features (HCC)    4. PATIENCE (generalized anxiety disorder)    5. Undifferentiated connective tissue disease (HCC)    6. Enlarged tonsils    7. GUERA (obstructive sleep apnea)    8. Other migraine without status migrainosus, not intractable    Assessment & Plan  1. Annual examination.  Her medications appear to be effectively managing her conditions. She is current with her tetanus vaccine. Her Pap smear results will be obtained from her OB-GYN.    2. Anxiety and depression.  She is currently on  Wellbutrin 300 mg, Xanax as needed, and Zoloft 100 mg. She reports no new symptoms of depression or anxiety.    3. Migraine.  She is using Emgality injections once a month, which has significantly reduced her migraines. She also uses Nurtec 75 mg as needed for breakthrough migraines.    4. Gastroesophageal reflux disease (GERD).  She is taking omeprazole, which is effectively managing her symptoms.    Return for Annual Preventative Exam.    {I have placed the above orders and discussed them with an approved delegating provider. The MA is performing the below orders under the direction of Dr. Karan PLATA    Health maintenance:    General Recommendations:   Smoking: recommend complete avoidance of all tobacco products  Alcohol: recommend limiting consumption  Physical Activity: goal is 30 min of moderate activity 5 times a week  Weight Management and Nutrition: high vegetable, high protein diet like the Mediterranean diet, portion control, avoid excessive sugars, Low Glycemic Index foods, adequate hydration, sleep.

## 2025-02-25 DIAGNOSIS — R21 RASH AND NONSPECIFIC SKIN ERUPTION: ICD-10-CM

## 2025-02-26 NOTE — TELEPHONE ENCOUNTER
triamcinolone acetonide (KENALOG) 0.1 % Ointment     Received request via: Pharmacy    Was the patient seen in the last year in this department? Yes    Does the patient have an active prescription (recently filled or refills available) for medication(s) requested? No    Pharmacy Name: [facility]     Does the patient have [facility] Plus and need 100-day supply? (This applies to ALL medications) Patient does not have SCP

## 2025-02-27 ENCOUNTER — PHARMACY VISIT (OUTPATIENT)
Dept: PHARMACY | Facility: MEDICAL CENTER | Age: 30
End: 2025-02-27
Payer: COMMERCIAL

## 2025-02-27 PROCEDURE — RXMED WILLOW AMBULATORY MEDICATION CHARGE: Performed by: DERMATOLOGY

## 2025-02-27 RX ORDER — CLINDAMYCIN PHOSPHATE 11.9 MG/ML
SOLUTION TOPICAL
Qty: 60 ML | Refills: 3 | Status: SHIPPED | OUTPATIENT
Start: 2025-02-27

## 2025-02-27 RX ORDER — TRIAMCINOLONE ACETONIDE 1 MG/G
OINTMENT TOPICAL
Qty: 30 G | Refills: 1 | Status: SHIPPED | OUTPATIENT
Start: 2025-02-27

## 2025-03-05 ENCOUNTER — APPOINTMENT (OUTPATIENT)
Dept: RHEUMATOLOGY | Facility: MEDICAL CENTER | Age: 30
End: 2025-03-05
Payer: COMMERCIAL

## 2025-03-05 ENCOUNTER — PHARMACY VISIT (OUTPATIENT)
Dept: PHARMACY | Facility: MEDICAL CENTER | Age: 30
End: 2025-03-05
Payer: COMMERCIAL

## 2025-03-28 ENCOUNTER — APPOINTMENT (OUTPATIENT)
Dept: MEDICAL GROUP | Facility: MEDICAL CENTER | Age: 30
End: 2025-03-28
Payer: COMMERCIAL

## 2025-04-16 PROCEDURE — RXMED WILLOW AMBULATORY MEDICATION CHARGE: Performed by: NURSE PRACTITIONER

## 2025-04-17 ENCOUNTER — PHARMACY VISIT (OUTPATIENT)
Dept: PHARMACY | Facility: MEDICAL CENTER | Age: 30
End: 2025-04-17
Payer: COMMERCIAL

## 2025-05-30 ENCOUNTER — INITIAL PRENATAL (OUTPATIENT)
Dept: OBGYN | Facility: CLINIC | Age: 30
End: 2025-05-30

## 2025-05-30 ENCOUNTER — APPOINTMENT (OUTPATIENT)
Dept: OBGYN | Facility: CLINIC | Age: 30
End: 2025-05-30

## 2025-05-30 VITALS — WEIGHT: 168 LBS | BODY MASS INDEX: 33.93 KG/M2 | SYSTOLIC BLOOD PRESSURE: 102 MMHG | DIASTOLIC BLOOD PRESSURE: 52 MMHG

## 2025-05-30 DIAGNOSIS — F33.2 SEVERE EPISODE OF RECURRENT MAJOR DEPRESSIVE DISORDER, WITHOUT PSYCHOTIC FEATURES (HCC): ICD-10-CM

## 2025-05-30 DIAGNOSIS — Z34.81 ENCOUNTER FOR SUPERVISION OF OTHER NORMAL PREGNANCY IN FIRST TRIMESTER: ICD-10-CM

## 2025-05-30 DIAGNOSIS — M35.9 UNDIFFERENTIATED CONNECTIVE TISSUE DISEASE (HCC): Primary | ICD-10-CM

## 2025-05-30 PROCEDURE — RXMED WILLOW AMBULATORY MEDICATION CHARGE: Performed by: PHYSICIAN ASSISTANT

## 2025-05-30 RX ORDER — ONDANSETRON 4 MG/1
4 TABLET, ORALLY DISINTEGRATING ORAL EVERY 6 HOURS PRN
Qty: 20 TABLET | Refills: 0 | Status: SHIPPED | OUTPATIENT
Start: 2025-05-30

## 2025-05-30 RX ORDER — BUPROPION HYDROCHLORIDE 150 MG/1
300 TABLET, EXTENDED RELEASE ORAL DAILY
COMMUNITY

## 2025-05-30 RX ORDER — SERTRALINE HYDROCHLORIDE 100 MG/1
100 TABLET, FILM COATED ORAL DAILY
COMMUNITY

## 2025-05-30 ASSESSMENT — EDINBURGH POSTNATAL DEPRESSION SCALE (EPDS)
THE THOUGHT OF HARMING MYSELF HAS OCCURRED TO ME: NEVER
THINGS HAVE BEEN GETTING ON TOP OF ME: NO, MOST OF THE TIME I HAVE COPED QUITE WELL
TOTAL SCORE: 6
I HAVE BEEN ABLE TO LAUGH AND SEE THE FUNNY SIDE OF THINGS: AS MUCH AS I ALWAYS COULD
I HAVE BEEN ANXIOUS OR WORRIED FOR NO GOOD REASON: YES, SOMETIMES
I HAVE BEEN SO UNHAPPY THAT I HAVE HAD DIFFICULTY SLEEPING: NOT AT ALL
I HAVE FELT SAD OR MISERABLE: NOT VERY OFTEN
I HAVE BEEN SO UNHAPPY THAT I HAVE BEEN CRYING: NO, NEVER
I HAVE LOOKED FORWARD WITH ENJOYMENT TO THINGS: AS MUCH AS I EVER DID
I HAVE FELT SCARED OR PANICKY FOR NO GOOD REASON: NO, NOT MUCH
I HAVE BLAMED MYSELF UNNECESSARILY WHEN THINGS WENT WRONG: NOT VERY OFTEN

## 2025-05-30 ASSESSMENT — LIFESTYLE VARIABLES: SUBSTANCE_ABUSE: 0

## 2025-05-30 ASSESSMENT — ENCOUNTER SYMPTOMS
DEPRESSION: 1
VOMITING: 1
NERVOUS/ANXIOUS: 1
NAUSEA: 1

## 2025-05-30 ASSESSMENT — FIBROSIS 4 INDEX: FIB4 SCORE: .465

## 2025-05-30 NOTE — PROGRESS NOTES
Pt. Here for NOB visit.  # [phone]  LMP 3/25/2025  Last pap smear 3/3/2023 WNL   First prenatal care  Pt. States having nausea and vomiting.   EPDS = 6  NIPT's offered states will have insurance in 6/2025

## 2025-05-30 NOTE — LETTER
May 30, 2025            Isa Lamar is currently being cared for at [facility].  This patient is pregnant and may continue to work.  She should not lift greater than 20 pounds and requires frequent rest periods (10 minutes every two hours).        Thank you,          SHWETA Fisher.

## 2025-05-30 NOTE — PROGRESS NOTES
Subjective     Isa Lamar is a 29 y.o. female who presents with New ob visit. Pt is fairly sure of LMP. Dating is by US today. Pt planning [facility] Castro once she has insurance in a few weeks.  OBHx: Hx term  without GDM, PIH or delivery complications, different FOB than last pregnancy.   PMHx: Undifferentiated connective tissue disorder - saw Rheum but no meds, no f/u. Reynaud's, anxiety and depression - on meds and sees therapist, stable.   SHX: R hand pins - denies surgical or anesthetic complications  Allergies: NKDA  Social: Denies tob, etoh or drug use   Meds; Taking PNV and DHA  Pt currently denies cramping, bleeding or pain but significant N/V. No FM.             HPI    Review of Systems   Gastrointestinal:  Positive for nausea and vomiting.   Psychiatric/Behavioral:  Positive for depression. Negative for substance abuse and suicidal ideas. The patient is nervous/anxious.    All other systems reviewed and are negative.             Objective     /52   Wt 168 lb   LMP 2025   BMI 33.93 kg/m²      Physical Exam  Vitals reviewed.   Constitutional:       Appearance: She is well-developed.   HENT:      Head: Normocephalic and atraumatic.   Eyes:      Pupils: Pupils are equal, round, and reactive to light.   Neck:      Thyroid: No thyromegaly.   Cardiovascular:      Rate and Rhythm: Normal rate and regular rhythm.      Heart sounds: Normal heart sounds.   Pulmonary:      Effort: Pulmonary effort is normal. No respiratory distress.      Breath sounds: Normal breath sounds.   Abdominal:      General: Bowel sounds are normal. There is no distension.      Palpations: Abdomen is soft.      Tenderness: There is no abdominal tenderness.   Genitourinary:     Exam position: Supine.      Labia:         Right: No rash or tenderness.         Left: No rash or tenderness.       Vagina: Normal. No signs of injury and foreign body. No vaginal discharge or erythema.       Uterus: Enlarged (Gravid, uterus c/w 7 wk size). Not deviated and not tender.    Musculoskeletal:      Cervical back: Normal range of motion and neck supple.   Skin:     General: Skin is warm and dry.      Findings: No erythema.   Neurological:      Mental Status: She is alert.      Deep Tendon Reflexes: Reflexes are normal and symmetric.   Psychiatric:         Behavior: Behavior normal.         Thought Content: Thought content normal.                Abd US shows single viable IUP at 7w3d with NATHANIEL 1/13/26. +FM, +cardiac activity at 173bpm. Unknown placenta as too early, yolk sac seen, STEPHANIE subjectively wnl.  Prominent umbilical cord noted, to be followed up with MFM.     CRL:1.20cm (1.20,1.24,1.17)    Per ACOG guidelines, I would recommend changing the NATHANIEL based on today's US, as the NATHANIEL is 15 different than dates based on LMP. FINAL NATHANIEL 1/13/26 based on US today.                 Assessment & Plan  Severe episode of recurrent major depressive disorder, without psychotic features (HCC)  - Stable on meds, f/u with psych       Undifferentiated connective tissue disease (HCC)  - Planning transfer to Dr Castro at [facility] referral placed  Orders:    Referral to Perinatology    Encounter for supervision of other normal pregnancy in first trimester  - F/u 4 wks, US 13 wk  - PAP wnl 3/23 - f/u 3/26  - Declines GC/CT testing today  Orders:    PREG CNTR PRENATAL PN; Future    US-OB 2ND 3RD TRI COMPLETE; Future    Chlamydia/GC, PCR (Genital/Anal swab); Future

## 2025-05-30 NOTE — ASSESSMENT & PLAN NOTE
- Planning transfer to Dr Castro at [facility] referral placed  Orders:    Referral to Perinatology

## 2025-06-05 NOTE — Clinical Note
REFERRAL APPROVAL NOTICE         Sent on June 5, 2025                   Isa Whitmore Willard  [address]  [address]  [place] [address]                   Dear Ms. Lamar,    After a careful review of the medical information and benefit coverage, [facility] has processed your referral. See below for additional details.    If applicable, you must be actively enrolled with your insurance for coverage of the authorized service. If you have any questions regarding your coverage, please contact your insurance directly.    REFERRAL INFORMATION   Referral #:  [id number]  Referred-To Provider    Referred-By Provider:  OB & Gyn - Maternal & Fetal Medicine    STEFAN Fisher   [facility] [place]      [address]  [place] [address] [phone] [address]  [address]  [place] [address]  [phone]    Referral Start Date:  05/30/2025  Referral End Date:   05/30/2026             SCHEDULING  If you do not already have an appointment, please call [phone] to make an appointment.     MORE INFORMATION  If you do not already have a [facility] account, sign up at: [email or web address]  You can access your medical information, make appointments, see lab results, billing information, and more.  If you have questions regarding this referral, please contact  the [facility] Referrals department at:             [phone]. Monday - Friday 8:00AM - 5:00PM.     Sincerely,    [facility]

## 2025-06-10 ENCOUNTER — PHARMACY VISIT (OUTPATIENT)
Dept: PHARMACY | Facility: MEDICAL CENTER | Age: 30
End: 2025-06-10
Payer: COMMERCIAL

## 2025-06-17 PROCEDURE — RXMED WILLOW AMBULATORY MEDICATION CHARGE: Performed by: NURSE PRACTITIONER

## 2025-06-19 ENCOUNTER — PHARMACY VISIT (OUTPATIENT)
Dept: PHARMACY | Facility: MEDICAL CENTER | Age: 30
End: 2025-06-19
Payer: COMMERCIAL

## 2025-06-20 PROCEDURE — RXMED WILLOW AMBULATORY MEDICATION CHARGE: Performed by: NURSE PRACTITIONER

## 2025-07-02 PROCEDURE — RXMED WILLOW AMBULATORY MEDICATION CHARGE: Performed by: OBSTETRICS & GYNECOLOGY

## 2025-07-07 ENCOUNTER — PHARMACY VISIT (OUTPATIENT)
Dept: PHARMACY | Facility: MEDICAL CENTER | Age: 30
End: 2025-07-07
Payer: COMMERCIAL

## 2025-07-07 RX ORDER — DOXYLAMINE SUCCINATE AND PYRIDOXINE HYDROCHLORIDE, DELAYED RELEASE TABLETS 10 MG/10 MG 10; 10 MG/1; MG/1
TABLET, DELAYED RELEASE ORAL
Qty: 60 TABLET | Refills: 0 | OUTPATIENT
Start: 2025-07-07

## 2025-07-07 RX ORDER — PROMETHAZINE HYDROCHLORIDE 12.5 MG/1
SUPPOSITORY RECTAL
Qty: 12 SUPPOSITORY | Refills: 0 | OUTPATIENT
Start: 2025-07-04

## 2025-08-11 PROCEDURE — RXMED WILLOW AMBULATORY MEDICATION CHARGE: Performed by: OBSTETRICS & GYNECOLOGY

## 2025-08-15 ENCOUNTER — PHARMACY VISIT (OUTPATIENT)
Dept: PHARMACY | Facility: MEDICAL CENTER | Age: 30
End: 2025-08-15
Payer: COMMERCIAL

## 2025-08-16 ENCOUNTER — HOSPITAL ENCOUNTER (OUTPATIENT)
Dept: LAB | Facility: MEDICAL CENTER | Age: 30
End: 2025-08-16
Attending: STUDENT IN AN ORGANIZED HEALTH CARE EDUCATION/TRAINING PROGRAM
Payer: COMMERCIAL

## 2025-08-16 ENCOUNTER — HOSPITAL ENCOUNTER (OUTPATIENT)
Dept: LAB | Facility: MEDICAL CENTER | Age: 30
End: 2025-08-16
Attending: OBSTETRICS & GYNECOLOGY
Payer: COMMERCIAL

## 2025-08-16 LAB
ALBUMIN SERPL BCP-MCNC: 3.6 G/DL (ref 3.2–4.9)
ALBUMIN/GLOB SERPL: 1.1 G/DL
ALP SERPL-CCNC: 55 U/L (ref 30–99)
ALT SERPL-CCNC: 13 U/L (ref 2–50)
ANION GAP SERPL CALC-SCNC: 11 MMOL/L (ref 7–16)
AST SERPL-CCNC: 16 U/L (ref 12–45)
BASOPHILS # BLD AUTO: 0.1 % (ref 0–1.8)
BASOPHILS # BLD: 0.01 K/UL (ref 0–0.12)
BILIRUB SERPL-MCNC: 0.3 MG/DL (ref 0.1–1.5)
BUN SERPL-MCNC: 4 MG/DL (ref 8–22)
CALCIUM ALBUM COR SERPL-MCNC: 9.2 MG/DL (ref 8.5–10.5)
CALCIUM SERPL-MCNC: 8.9 MG/DL (ref 8.5–10.5)
CHLORIDE SERPL-SCNC: 106 MMOL/L (ref 96–112)
CO2 SERPL-SCNC: 20 MMOL/L (ref 20–33)
CREAT SERPL-MCNC: 0.63 MG/DL (ref 0.5–1.4)
EOSINOPHIL # BLD AUTO: 0.07 K/UL (ref 0–0.51)
EOSINOPHIL NFR BLD: 0.7 % (ref 0–6.9)
ERYTHROCYTE [DISTWIDTH] IN BLOOD BY AUTOMATED COUNT: 44.7 FL (ref 35.9–50)
FERRITIN SERPL-MCNC: 21.3 NG/ML (ref 10–291)
GFR SERPLBLD CREATININE-BSD FMLA CKD-EPI: 121 ML/MIN/1.73 M 2
GLOBULIN SER CALC-MCNC: 3.3 G/DL (ref 1.9–3.5)
GLUCOSE SERPL-MCNC: 84 MG/DL (ref 65–99)
HCT VFR BLD AUTO: 38.3 % (ref 37–47)
HGB BLD-MCNC: 12.9 G/DL (ref 12–16)
IMM GRANULOCYTES # BLD AUTO: 0.05 K/UL (ref 0–0.11)
IMM GRANULOCYTES NFR BLD AUTO: 0.5 % (ref 0–0.9)
LYMPHOCYTES # BLD AUTO: 1.97 K/UL (ref 1–4.8)
LYMPHOCYTES NFR BLD: 20.8 % (ref 22–41)
MCH RBC QN AUTO: 31.4 PG (ref 27–33)
MCHC RBC AUTO-ENTMCNC: 33.7 G/DL (ref 32.2–35.5)
MCV RBC AUTO: 93.2 FL (ref 81.4–97.8)
MONOCYTES # BLD AUTO: 0.51 K/UL (ref 0–0.85)
MONOCYTES NFR BLD AUTO: 5.4 % (ref 0–13.4)
NEUTROPHILS # BLD AUTO: 6.87 K/UL (ref 1.82–7.42)
NEUTROPHILS NFR BLD: 72.5 % (ref 44–72)
NRBC # BLD AUTO: 0 K/UL
NRBC BLD-RTO: 0 /100 WBC (ref 0–0.2)
PLATELET # BLD AUTO: 209 K/UL (ref 164–446)
PMV BLD AUTO: 10.4 FL (ref 9–12.9)
POTASSIUM SERPL-SCNC: 3.7 MMOL/L (ref 3.6–5.5)
PROT SERPL-MCNC: 6.9 G/DL (ref 6–8.2)
RBC # BLD AUTO: 4.11 M/UL (ref 4.2–5.4)
SODIUM SERPL-SCNC: 137 MMOL/L (ref 135–145)
WBC # BLD AUTO: 9.5 K/UL (ref 4.8–10.8)

## 2025-08-16 PROCEDURE — 86235 NUCLEAR ANTIGEN ANTIBODY: CPT | Mod: 91

## 2025-08-16 PROCEDURE — 80053 COMPREHEN METABOLIC PANEL: CPT

## 2025-08-16 PROCEDURE — 82728 ASSAY OF FERRITIN: CPT

## 2025-08-16 PROCEDURE — 36415 COLL VENOUS BLD VENIPUNCTURE: CPT

## 2025-08-16 PROCEDURE — 85025 COMPLETE CBC W/AUTO DIFF WBC: CPT

## 2025-08-19 LAB
ENA SS-A 60KD AB SER-ACNC: 1 AU/ML (ref 0–40)
ENA SS-A IGG SER QL: 6 AU/ML (ref 0–40)
ENA SS-B IGG SER IA-ACNC: 0 AU/ML (ref 0–40)

## 2025-08-19 PROCEDURE — RXMED WILLOW AMBULATORY MEDICATION CHARGE: Performed by: NURSE PRACTITIONER

## 2025-08-22 ENCOUNTER — PHARMACY VISIT (OUTPATIENT)
Dept: PHARMACY | Facility: MEDICAL CENTER | Age: 30
End: 2025-08-22
Payer: COMMERCIAL